# Patient Record
Sex: MALE | Race: ASIAN | NOT HISPANIC OR LATINO | URBAN - METROPOLITAN AREA
[De-identification: names, ages, dates, MRNs, and addresses within clinical notes are randomized per-mention and may not be internally consistent; named-entity substitution may affect disease eponyms.]

---

## 2023-08-28 ENCOUNTER — INPATIENT (INPATIENT)
Facility: HOSPITAL | Age: 83
LOS: 8 days | Discharge: NURSING, REHAB OR LONG-TERM CARE | DRG: 64 | End: 2023-09-06
Attending: PSYCHIATRY & NEUROLOGY | Admitting: PSYCHIATRY & NEUROLOGY
Payer: MEDICAID

## 2023-08-28 VITALS
SYSTOLIC BLOOD PRESSURE: 183 MMHG | HEART RATE: 75 BPM | TEMPERATURE: 98 F | RESPIRATION RATE: 20 BRPM | DIASTOLIC BLOOD PRESSURE: 97 MMHG | OXYGEN SATURATION: 94 %

## 2023-08-28 DIAGNOSIS — R42 DIZZINESS AND GIDDINESS: ICD-10-CM

## 2023-08-28 LAB
ALBUMIN SERPL ELPH-MCNC: 4.1 G/DL — SIGNIFICANT CHANGE UP (ref 3.3–5)
ALP SERPL-CCNC: 80 U/L — SIGNIFICANT CHANGE UP (ref 40–120)
ALT FLD-CCNC: 25 U/L — SIGNIFICANT CHANGE UP (ref 10–45)
ANION GAP SERPL CALC-SCNC: 15 MMOL/L — SIGNIFICANT CHANGE UP (ref 5–17)
APPEARANCE UR: CLEAR — SIGNIFICANT CHANGE UP
APTT BLD: 28.7 SEC — SIGNIFICANT CHANGE UP (ref 24.5–35.6)
AST SERPL-CCNC: 27 U/L — SIGNIFICANT CHANGE UP (ref 10–40)
BACTERIA # UR AUTO: NEGATIVE — SIGNIFICANT CHANGE UP
BASOPHILS # BLD AUTO: 0.02 K/UL — SIGNIFICANT CHANGE UP (ref 0–0.2)
BASOPHILS NFR BLD AUTO: 0.3 % — SIGNIFICANT CHANGE UP (ref 0–2)
BILIRUB SERPL-MCNC: 2.4 MG/DL — HIGH (ref 0.2–1.2)
BILIRUB UR-MCNC: NEGATIVE — SIGNIFICANT CHANGE UP
BUN SERPL-MCNC: 17 MG/DL — SIGNIFICANT CHANGE UP (ref 7–23)
CALCIUM SERPL-MCNC: 9.1 MG/DL — SIGNIFICANT CHANGE UP (ref 8.4–10.5)
CHLORIDE SERPL-SCNC: 104 MMOL/L — SIGNIFICANT CHANGE UP (ref 96–108)
CO2 SERPL-SCNC: 23 MMOL/L — SIGNIFICANT CHANGE UP (ref 22–31)
COLOR SPEC: YELLOW — SIGNIFICANT CHANGE UP
CREAT SERPL-MCNC: 0.76 MG/DL — SIGNIFICANT CHANGE UP (ref 0.5–1.3)
DIFF PNL FLD: NEGATIVE — SIGNIFICANT CHANGE UP
EGFR: 90 ML/MIN/1.73M2 — SIGNIFICANT CHANGE UP
EOSINOPHIL # BLD AUTO: 0.08 K/UL — SIGNIFICANT CHANGE UP (ref 0–0.5)
EOSINOPHIL NFR BLD AUTO: 1.2 % — SIGNIFICANT CHANGE UP (ref 0–6)
EPI CELLS # UR: 2 /HPF — SIGNIFICANT CHANGE UP
GLUCOSE SERPL-MCNC: 151 MG/DL — HIGH (ref 70–99)
GLUCOSE UR QL: ABNORMAL
HCT VFR BLD CALC: 42.9 % — SIGNIFICANT CHANGE UP (ref 39–50)
HGB BLD-MCNC: 14.5 G/DL — SIGNIFICANT CHANGE UP (ref 13–17)
HYALINE CASTS # UR AUTO: 2 /LPF — SIGNIFICANT CHANGE UP (ref 0–2)
IMM GRANULOCYTES NFR BLD AUTO: 0.5 % — SIGNIFICANT CHANGE UP (ref 0–0.9)
INR BLD: 1.03 RATIO — SIGNIFICANT CHANGE UP (ref 0.85–1.18)
KETONES UR-MCNC: NEGATIVE — SIGNIFICANT CHANGE UP
LEUKOCYTE ESTERASE UR-ACNC: NEGATIVE — SIGNIFICANT CHANGE UP
LYMPHOCYTES # BLD AUTO: 1.4 K/UL — SIGNIFICANT CHANGE UP (ref 1–3.3)
LYMPHOCYTES # BLD AUTO: 21.3 % — SIGNIFICANT CHANGE UP (ref 13–44)
MCHC RBC-ENTMCNC: 31.9 PG — SIGNIFICANT CHANGE UP (ref 27–34)
MCHC RBC-ENTMCNC: 33.8 GM/DL — SIGNIFICANT CHANGE UP (ref 32–36)
MCV RBC AUTO: 94.3 FL — SIGNIFICANT CHANGE UP (ref 80–100)
MONOCYTES # BLD AUTO: 0.77 K/UL — SIGNIFICANT CHANGE UP (ref 0–0.9)
MONOCYTES NFR BLD AUTO: 11.7 % — SIGNIFICANT CHANGE UP (ref 2–14)
NEUTROPHILS # BLD AUTO: 4.28 K/UL — SIGNIFICANT CHANGE UP (ref 1.8–7.4)
NEUTROPHILS NFR BLD AUTO: 65 % — SIGNIFICANT CHANGE UP (ref 43–77)
NITRITE UR-MCNC: NEGATIVE — SIGNIFICANT CHANGE UP
NRBC # BLD: 0 /100 WBCS — SIGNIFICANT CHANGE UP (ref 0–0)
PH UR: 7.5 — SIGNIFICANT CHANGE UP (ref 5–8)
PLATELET # BLD AUTO: 158 K/UL — SIGNIFICANT CHANGE UP (ref 150–400)
POTASSIUM SERPL-MCNC: 3.4 MMOL/L — LOW (ref 3.5–5.3)
POTASSIUM SERPL-SCNC: 3.4 MMOL/L — LOW (ref 3.5–5.3)
PROT SERPL-MCNC: 7 G/DL — SIGNIFICANT CHANGE UP (ref 6–8.3)
PROT UR-MCNC: ABNORMAL
PROTHROM AB SERPL-ACNC: 11.3 SEC — SIGNIFICANT CHANGE UP (ref 9.5–13)
RBC # BLD: 4.55 M/UL — SIGNIFICANT CHANGE UP (ref 4.2–5.8)
RBC # FLD: 12.9 % — SIGNIFICANT CHANGE UP (ref 10.3–14.5)
RBC CASTS # UR COMP ASSIST: 3 /HPF — SIGNIFICANT CHANGE UP (ref 0–4)
SODIUM SERPL-SCNC: 142 MMOL/L — SIGNIFICANT CHANGE UP (ref 135–145)
SP GR SPEC: 1.04 — HIGH (ref 1.01–1.02)
TROPONIN T, HIGH SENSITIVITY RESULT: 25 NG/L — SIGNIFICANT CHANGE UP (ref 0–51)
UROBILINOGEN FLD QL: ABNORMAL
WBC # BLD: 6.58 K/UL — SIGNIFICANT CHANGE UP (ref 3.8–10.5)
WBC # FLD AUTO: 6.58 K/UL — SIGNIFICANT CHANGE UP (ref 3.8–10.5)
WBC UR QL: 4 /HPF — SIGNIFICANT CHANGE UP (ref 0–5)

## 2023-08-28 PROCEDURE — 70547 MR ANGIOGRAPHY NECK W/O DYE: CPT | Mod: 26

## 2023-08-28 PROCEDURE — 99291 CRITICAL CARE FIRST HOUR: CPT

## 2023-08-28 PROCEDURE — 70498 CT ANGIOGRAPHY NECK: CPT | Mod: 26,MA

## 2023-08-28 PROCEDURE — 93306 TTE W/DOPPLER COMPLETE: CPT | Mod: 26

## 2023-08-28 PROCEDURE — 99285 EMERGENCY DEPT VISIT HI MDM: CPT

## 2023-08-28 PROCEDURE — 70450 CT HEAD/BRAIN W/O DYE: CPT | Mod: 26,59,MA

## 2023-08-28 PROCEDURE — 70496 CT ANGIOGRAPHY HEAD: CPT | Mod: 26,MA

## 2023-08-28 PROCEDURE — 70544 MR ANGIOGRAPHY HEAD W/O DYE: CPT | Mod: 26,59

## 2023-08-28 PROCEDURE — 0042T: CPT | Mod: MA

## 2023-08-28 PROCEDURE — 70551 MRI BRAIN STEM W/O DYE: CPT | Mod: 26

## 2023-08-28 RX ORDER — APIXABAN 2.5 MG/1
5 TABLET, FILM COATED ORAL EVERY 12 HOURS
Refills: 0 | Status: DISCONTINUED | OUTPATIENT
Start: 2023-08-28 | End: 2023-08-29

## 2023-08-28 RX ORDER — ATORVASTATIN CALCIUM 80 MG/1
80 TABLET, FILM COATED ORAL AT BEDTIME
Refills: 0 | Status: DISCONTINUED | OUTPATIENT
Start: 2023-08-28 | End: 2023-09-06

## 2023-08-28 RX ADMIN — ATORVASTATIN CALCIUM 80 MILLIGRAM(S): 80 TABLET, FILM COATED ORAL at 21:51

## 2023-08-28 RX ADMIN — APIXABAN 5 MILLIGRAM(S): 2.5 TABLET, FILM COATED ORAL at 17:16

## 2023-08-28 NOTE — CHART NOTE - NSCHARTNOTEFT_GEN_A_CORE
***Vascular Neurology Follow-up Chart Note***    Patient seen today with attending on rounds. Please also refer to attending addendum on initial H&P dated 8/28    Exam stable except: __      Impression: Acute onset of vertigo likely 2/2 cerebellar vs. brainstem dysfunction due to acute ischemic stroke. Mechanism of stroke potentially cardioembolic due to Afib.       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 This is difficult to age and could be acute or chronic. The more distal basilar artery and its branches are patent.   [] TTE                   [] A1C, LDL  [x] Atorvastatin 80  []  Physical therapy/OT/Speech Language    SBP goal: Permissive HTN up to 220/110 mmHg for first 24 hours after symptom onset, followed by gradual normotension  Other:    Case & Plan discussed with patient and family. All questions answered. Plan discussed with primary team       CORE MEASURES:         Admission NIHSS: 1 (dysarthria)     ICH score: N/A     Hunt and Rangel Score: N/A      Tenecteplase: [] YES [x] NO     Statin Therapy: [x] YES [] NO     Smoking [] YES [x] NO     Afib [x] YES [] NO     Stroke Education [x] YES [] NO    Dysphagia screen : [] Passed [] Failed- S&S pending [x] Pending  (Ensure medications are ordered via appropriate route)    DVT ppx: Therapeutic AC Eliquis 5 BID ***Vascular Neurology Follow-up Chart Note***    Patient seen today with attending on rounds. Please also refer to attending addendum on initial H&P dated 8/28    Exam stable      Impression: Acute onset of vertigo likely 2/2 cerebellar vs. brainstem dysfunction due to acute ischemic stroke. Mechanism of stroke potentially cardioembolic due to Afib.       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 This is difficult to age and could be acute or chronic. The more distal basilar artery and its branches are patent.   [] TTE                   [] A1C, LDL  [x] Atorvastatin 80  []  Physical therapy/OT/Speech Language    SBP goal: Permissive HTN up to 220/110 mmHg for first 24 hours after symptom onset, followed by gradual normotension  Other:    Case & Plan discussed with patient and family. All questions answered. Plan discussed with primary team       CORE MEASURES:         Admission NIHSS: 1 (dysarthria)     ICH score: N/A     Hunt and Rangel Score: N/A      Tenecteplase: [] YES [x] NO     Statin Therapy: [x] YES [] NO     Smoking [] YES [x] NO     Afib [x] YES [] NO     Stroke Education [x] YES [] NO    Dysphagia screen : [] Passed [] Failed- S&S pending [x] Pending  (Ensure medications are ordered via appropriate route)    DVT ppx: Therapeutic AC Eliquis 5 BID ***Vascular Neurology Follow-up Chart Note***    Patient seen today with attending on rounds. Please also refer to attending addendum on initial H&P dated 8/28    Exam stable      Impression: Acute onset of vertigo likely 2/2 cerebellar vs. brainstem dysfunction due to acute ischemic stroke. Mechanism of stroke potentially cardioembolic due to Afib.       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 This is difficult to age and could be acute or chronic. The more distal basilar artery and its branches are patent.   [] TTE                   [] A1C, LDL  [x] Atorvastatin 80  []  Physical therapy/OT/Speech Language    SBP goal: Permissive HTN up to 220/120 mmHg for first 24 hours after symptom onset, followed by gradual normotension  Other:    Case & Plan discussed with patient and family. All questions answered. Plan discussed with primary team       CORE MEASURES:         Admission NIHSS: 1 (dysarthria)     ICH score: N/A     Hunt and Rangel Score: N/A      Tenecteplase: [] YES [x] NO     Statin Therapy: [x] YES [] NO     Smoking [] YES [x] NO     Afib [x] YES [] NO     Stroke Education [x] YES [] NO    Dysphagia screen : [] Passed [] Failed- S&S pending [x] Pending  (Ensure medications are ordered via appropriate route)    DVT ppx: Therapeutic AC Eliquis 5 BID ***Vascular Neurology Follow-up Chart Note***    Patient seen today with attending on rounds. Please also refer to attending addendum on initial H&P dated 8/28    Exam stable      Impression: Acute onset of vertigo likely 2/2 cerebellar vs. brainstem dysfunction due to acute ischemic stroke. Mechanism of stroke potentially cardioembolic due to Afib.       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 This is difficult to age and could be acute or chronic. The more distal basilar artery and its branches are patent.   [] TTE                   [] A1C, LDL  [x] Atorvastatin 80  []  Physical therapy/OT/Speech Language    SBP goal: gradual normotension  Other:    Case & Plan discussed with patient and family. All questions answered. Plan discussed with primary team       CORE MEASURES:         Admission NIHSS: 1 (dysarthria)     ICH score: N/A     Hunt and Rangel Score: N/A      Tenecteplase: [] YES [x] NO     Statin Therapy: [x] YES [] NO     Smoking [] YES [x] NO     Afib [x] YES [] NO     Stroke Education [x] YES [] NO      Dysphagia screen : [] Passed [] Failed- S&S pending [x] Pending  (Ensure medications are ordered via appropriate route)    DVT ppx: Therapeutic AC Eliquis 5 BID

## 2023-08-28 NOTE — PHYSICAL THERAPY INITIAL EVALUATION ADULT - ADDITIONAL COMMENTS
as per pt, resides in a PH with daughter and her extend family, no stair to enter, stays on main floor, PTA, pt amb (I) with SC, distance limited to 450ft d/t weakness, (I) with ADLs.

## 2023-08-28 NOTE — ED PROVIDER NOTE - NS ED ROS FT
Constitutional:  (-) fever, (-) chills, (-) lethargy  Eyes:  (-) eye pain (-) visual changes  ENMT: (-) nasal discharge, (-) sore throat. (-) neck pain or stiffness  Cardiac: (-) chest pain (-) palpitations  Respiratory:  (-) cough (-) respiratory distress.   GI:  (-) nausea (-) vomiting (-) diarrhea (-) abdominal pain.  :  (-) dysuria (-) frequency (-) burning.  MS:  (-) back pain (-) joint pain.  Neuro: (+) dizziness (-) headache (-) numbness (-) tingling (-) focal weakness  Skin:  (-) rash  Except as documented in the HPI,  all other systems are negative

## 2023-08-28 NOTE — ED PROVIDER NOTE - ATTENDING CONTRIBUTION TO CARE
Real Paul DO: I have personally performed a face to face medical and diagnostic evaluation of the patient. I have discussed with and reviewed the Resident's and/or ACP's and/or Medical/PA/NP student's note and agree with the History, ROS, Physical Exam and MDM unless otherwise indicated. A brief summary of my personal evaluation and impression can be found below.     82-year-old male, history of CVA x2, baseline slurred speech, ?A-fib,, hypertension, not on AC, presenting with 1 day cute onset of headache, dizziness (spinning sensation) started at 12 AM.  Denies new changes in speech, weakness, numbness of extremities, chest pain, shortness of breath.  History obtained via Mandarin translation by granddaughter who is at bedside. Vital signs within normal limits.  Fingerstick was 135.      PE No acute distress. arousable, AAO x1, able to answer question appropriately w/ , slurred speech noted (baseline per granddaughter), no facial droop noted, cn III-xii intact otherwise, eomi, perrla, full extremity rom w/ 5/5 str, RESP: clear to ausculation b/l. No crackles or wheezing.  CARD: RRR, no murmurs.    New symptoms in the setting of prior strokes concerning for possible repeat stroke patient called as a code stroke, with residual symptoms unclear if at baseline we will continue with stroke imaging neurology is at bedside assessing patient, dispo pending lab results imaging results, metabolic infectious origin not yet ruled out although concern for stroke still present.  Patient will likely be admitted even if work-up is negative in the ED for MRI.

## 2023-08-28 NOTE — ED PROVIDER NOTE - CLINICAL SUMMARY MEDICAL DECISION MAKING FREE TEXT BOX
82-year-old male, history of CVA x2, baseline slurred speech, ?A-fib,, hypertension, not on AC, presenting with 1 day cute onset of headache, dizziness (spinning sensation) started at 12 AM.  Denies new changes in speech, weakness, numbness of extremities, chest pain, shortness of breath.  History obtained via Mandarin translation by granddaughter who is at bedside. Vital signs within normal limits.  Fingerstick was 135.  Physical exam finding patient is not in acute distress, arousable, AAO x3, able to answer question appropriately, slurred speech noted (baseline per granddaughter), no facial droop noted, normal cranial nerves exam otherwise, moving extremities spontaneously with normal strength (5 out of 5) in all 4 extremities.  Normal respiratory effort, clear lung exam bilaterally, abdomen soft, nontender, no leg swelling noted.Code stroke was activated.  Neurology at bedside.  We will follow-up on code stroke labs, imaging result. 82-year-old male, history of CVA x2, baseline slurred speech, ?A-fib,, hypertension, not on AC, presenting with 1 day cute onset of headache, dizziness (spinning sensation) started at 12 AM.  Denies new changes in speech, weakness, numbness of extremities, chest pain, shortness of breath.  History obtained via Mandarin translation by granddaughter who is at bedside. Vital signs within normal limits.  Fingerstick was 135.  Physical exam finding patient is not in acute distress, arousable, AAO x2, able to answer question appropriately, slurred speech noted (baseline per granddaughter), no facial droop noted, normal cranial nerves exam otherwise, moving extremities spontaneously with normal strength (5 out of 5) in all 4 extremities.  Normal respiratory effort, clear lung exam bilaterally, abdomen soft, nontender, no leg swelling noted.Code stroke was activated.  Neurology at bedside.  We will follow-up on code stroke labs, imaging result.

## 2023-08-28 NOTE — H&P ADULT - NSHPREVIEWOFSYSTEMS_GEN_ALL_CORE
CONSTITUTIONAL: No fever, chills, or malaise  HEAD: No HA or trauma  EYES: No visual changes, diplopia, or pain   NECK: No pain or stiffness  RESPIRATORY: No SOB, cough, wheezing  CARDIOVASCULAR: No chest pain or palpitations  GASTROINTESTINAL: No abdominal pain. No nausea, vomiting  MUSCULOSKELETAL: No muscle or joint pain; no swelling of stiffness

## 2023-08-28 NOTE — H&P ADULT - HISTORY OF PRESENT ILLNESS
82M R-handed PMHx Afib (not on AC), HTN, and strokes (2021, 2022; residual deficit of dysarthria) presenting with sudden onset "dizziness." Pt was lying down in his bed getting ready to sleep when he noticed sudden onset of "room-spinning sensation." No prior h/o similar symptoms. No fever, chills, HA, nausea or vomiting. No focal numbness or weakness at the time of symptom onset. Symptoms continued to persist therefore, visited ED. Code stroke activated upon arrival. LKN: 0000 8/28/23. NIHSS: 1 (dysarthria). mRS: 1.

## 2023-08-28 NOTE — ED ADULT NURSE NOTE - NSFALLRISKINTERV_ED_ALL_ED
Assistance OOB with selected safe patient handling equipment if applicable/Assistance with ambulation/Communicate fall risk and risk factors to all staff, patient, and family/Encourage patient to sit up slowly, dangle for a short time, stand at bedside before walking/Monitor gait and stability/Orthostatic vital signs/Provide patient with walking aids/Provide visual cue: yellow wristband, yellow gown, etc/Reinforce activity limits and safety measures with patient and family/Call bell, personal items and telephone in reach/Instruct patient to call for assistance before getting out of bed/chair/stretcher/Non-slip footwear applied when patient is off stretcher/[place] to call system/Physically safe environment - no spills, clutter or unnecessary equipment/Purposeful Proactive Rounding/Room/bathroom lighting operational, light cord in reach

## 2023-08-28 NOTE — SPEECH LANGUAGE PATHOLOGY EVALUATION - COMMENTS
+low vocal volume/intensity; overall low energy level DNT. Pt unable to attend to writing/reading tasks/pen/paper tasks 1. Pt will improve cognitive-linguistic skills for functional communication.  2. Pt will maximize receptive language skills  3. Pt will maximize expressive language skills Neurologic Exam:  Mental status - Eyes closed but opens to voice stimuli, oriented to person, place, and time. Speech fluent but mildly dysarthric. Repetition and naming intact. Follows simple commands.    8/28 CT Brain - IMPRESSION: Chronic left parietotemporal infarct. No intracranial hemorrhage.  LKN: 0000 8/28/23  NIHSS: 1 (dysarthria)   mRS: 1   Neuro IMPRESSION: Acute onset of vertigo likely 2/2 cerebellar vs. brainstem dysfunction due to acute ischemic stroke. Mechanism of stroke potentially cardioembolic due to Afib.    8/28 @ 0152 - failed dysphagia screen  Per discussion with RN, pt's family provided porridge this morning with no difficulty demonstrated    No h/o speech/swallow work-up at this location per EMR Expressive deficits marked by limited initiation of speech, limited utterance length, non-contingent responses to simple questions, with frequent replies of "I don't know". Per daughter, May, at bedside, pt's speech has appeared baseline since his CVA last year in [place]. She states that he fluctuates with "clear thoughts and confusion" at baseline. Pt does not utilize AAC or gestures as primary means of communication Pt presents with cognitive deficits in the areas of STM/LTM, general state of confusion and disorganization. Pt responds "I don't know" frequently. Ongoing assessment warranted Per , speech articulation deemed "fine" Receptive language deficits; impaired ability to follow 1-step directives or accurately answer simple yes/no questions. Suspect memory deficits negatively impact this area DNT. Pt unable to attend to reading tasks/pen/paper tasks

## 2023-08-28 NOTE — H&P ADULT - ATTENDING COMMENTS
I have reviewed the case in detail, and edited the assessment and plan per fellow note (see additional information section).  Briefly Mr. 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  We will continue anticoagulation with apixaban despite large infarct as benefit of anticoagulation may offset the risk of hemorrhage (atrial fibrillation and nonocclusive thrombus in the basilar artery).  I would like to reimage him with an MRA in 7 to 10 days to look for improvement of vascular thrombus.  PT OT PMR consultation  No objection to gradual normotension with blood pressure control.

## 2023-08-28 NOTE — SPEECH LANGUAGE PATHOLOGY EVALUATION - SLP GENERAL OBSERVATIONS
Pt encountered bedside, +2LO2NC; daughter, May, at bedside. Video language line  utilized in Mandarin, ID#[id number]. Per daughter, pt has residual deficits in speech/thinking after CVA in [place] 1 year prior. Pt inconsistently able to follow 1-step directives. Pt AA&Ox2 (person, place). Pt is able to make simple wants/needs c/o pain known.

## 2023-08-28 NOTE — SPEECH LANGUAGE PATHOLOGY EVALUATION - SLP DIAGNOSIS
Pt presents with cognitive deficits as receptive/expressive language deficits. Per discussion with daughter, May, deficits are residual from a CVA 1 year prior while in [place]. Pt is inconsistently able to follow 1-step commands; inconsistently answers simple yes/no questions. Expressively, simple utterances noted, i.e. "I don't know" or 1-2 word phrases such as "woman cleaning". Likely a combination of language deficits as well as memory deficits, as pt appears confused frequently and responds "I don't know" to simple questions. Per , speech articulation is deemed "fine", however, she states that his understanding of language appears impaired. Frequent non-contingent responses to simple questions.

## 2023-08-28 NOTE — ED PROVIDER NOTE - PHYSICAL EXAMINATION
Gen: Patient is well-appearing, NAD, AAOx2, able to answer questions appropriately   HEENT: NCAT, EOMI, PERRLA, normal conjunctiva, tongue midline, oral mucosa moist, slurred speech noted  Lung: CTAB, no respiratory distress, no wheezes/rhonchi/rales B/L, speaking in full sentences  CV: RRR, no murmurs, rubs or gallops, distal pulses 2+ b/l  Abd: soft, NT, ND, no guarding, no rigidity, no rebound tenderness  MSK: no visible deformities, ROM normal in UE/LE, strength 5/5 in all four extremities   Neuro: slurred speech noted, No focal sensory or motor deficits, normal CN exam   Skin: Warm, well perfused, no leg swelling  Psych: normal affect, calm

## 2023-08-28 NOTE — H&P ADULT - NSHPPHYSICALEXAM_GEN_ALL_CORE
Physical Examination:  General - non-toxic appearing male in acute distress  Cardiovascular - peripheral pulses palpable, no edema  Respiratory - breathing comfortably with no increased work of breathing    Neurologic Exam:  Mental status - Eyes closed but opens to voice stimuli, oriented to person, place, and time. Speech fluent but mildly dysarthric. Repetition and naming intact. Follows simple commands.    Cranial nerves - PERRL (3mm -> 2mm b/l), BTT intact b/l, EOMI, face sensation (V1-V3) intact b/l, facial strength intact without asymmetry b/l, hearing intact b/l, palate with symmetric elevation, tongue midline on protrusion with full lateral movement    Motor - Normal bulk and tone throughout. No pronator drift. Bilateral upper and lower extremities without drift.     Sensation - Light touch and pinprick intact throughout    DTR's -             Biceps      Triceps     Brachioradialis      Patellar    Ankle    Toes/plantar response  R             2+                2+                  2+                            2+            2+                 neutral  L              2+                2+                  2+                             2+           2+                 neutral    Coordination - unable to assess due to pt not wanting to open eyes to participate due to severe dizziness    Gait and station - unable to assess due to pt safety

## 2023-08-28 NOTE — STROKE CODE NOTE - NSRESATTESTFELSPV_ED_ALL_CORE_FT
Transitions of Care Status:  1.  Name of PCP: Dr. Jefferson  2.  PCP Contacted on Admission: [x ] Y    [ ] N    3.  PCP contacted at Discharge: [ ] Y    [ ] N    [ ] N/A  4.  Post-Discharge Appointment Date and Location:  5.  Summary of Handoff given to PCP: Dr. Raya

## 2023-08-28 NOTE — H&P ADULT - NSHPADDITIONALINFOADULT_GEN_ALL_CORE
Neurovascular Fellow Attestation    82M with right anterior cerebellar infarct. Intitally presented with vertigo. 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 NIH 1 (dysarthria), MRS 1 CTH with chronic left PT infarct. CTA with proximal basilar clot and distal reconstitution. MRI with right cerebellar infarct. There is also chronic left parietal and left cerebellar infarcts. Clinically patient is improving and given the history of atrial fibrillation without any anticoagulation and multi-territorial infarcts, patient’s stroke etiology is likely cardioembolic and was started on Eliquis 5mg BID.  Recommend to maintain SBP without sudden fluctuations and close monitoring given posterior circulation stroke.     Parker Burr  Neurovascular Fellow Neurovascular Fellow Attestation    82M with right anterior cerebellar infarct. Initially presented with vertigo. 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 NIH 1 (dysarthria), MRS 1 CTH with chronic left temporoparietal infarct. CTA with proximal basilar clot and distal reconstitution. MRI with right cerebellar infarct. There is also chronic left parietal and left cerebellar infarcts. Clinically patient is improving and given the history of atrial fibrillation without any anticoagulation and multi-territorial infarcts, patient’s stroke etiology is likely cardioembolic and was started on Eliquis 5mg BID.  Recommend to maintain SBP without sudden fluctuations and close monitoring given posterior circulation stroke.     Parker Burr  Neurovascular Fellow

## 2023-08-28 NOTE — ED ADULT NURSE NOTE - NSFALLASSESSNEED_ED_ALL_ED
General Discharge Instructions   PLEASE READ YOUR DISCHARGE INSTRUCTIONS ENTIRELY AS IT CONTAINS IMPORTANT INFORMATION.  If you were prescribed a narcotic or controlled medication, do not drive or operate heavy equipment or machinery while taking these medications.  If you were prescribed antibiotics, please take them to completion.  You must understand that you've received an Urgent Care treatment only and that you may be released before all your medical problems are known or treated. You, the patient, will arrange for follow up care as instructed.    OVER THE COUNTER RECOMMENDATIONS/SUGGESTIONS.    Make sure to stay well hydrated.    Use Nasal Saline to mechanically move any post nasal drip from your eustachian tube or from the back of your throat.    Use warm salt water gargles to ease your throat pain. Warm salt water gargles as needed for sore throat- 1/2 tsp salt to 1 cup warm water, gargle as desired.    Use an antihistamine such as Claritin, Zyrtec or Allegra to dry you out.    Use pseudoephedrine (behind the counter) to decongest. Pseudoephedrine 30 mg up to 240 mg /day. It can raise your blood pressure and give you palpitations.    Use mucinex (guaifenesin) to break up mucous up to 2400mg/day to loosen any mucous.    The mucinex DM pill has a cough suppressant that can be sedating. It can be used at night to stop the tickle at the back of your throat.    You can use Mucinex D (it has guaifenesin and a high dose of pseudoephedrine) in the mornings to help decongest.    Use Afrin in each nare for no longer than 3 days, as it is addictive. It can also dry out your mucous membranes and cause elevated blood pressure. This is especially useful if you are flying.    Use Flonase 1-2 sprays/nostril per day. It is a local acting steroid nasal spray, if you develop a bloody nose, stop using the medication immediately.    Sometimes Nyquil at night is beneficial to help you get some rest, however it is sedating and it  does have an antihistamine, and tylenol.    Honey is a natural cough suppressant that can be used.    Tylenol up to 4,000 mg a day is safe for short periods and can be used for body aches, pain, and fever. However in high doses and prolonged use it can cause liver irritation.    Ibuprofen is a non-steroidal anti-inflammatory that can be used for body aches, pain, and fever.However it can also cause stomach irritation if over used.     Follow up with your PCP or specialty clinic as instructed in the next 2-3 days if not improved or as needed. You can call [phone] to schedule an appointment with appropriate provider.      If you condition worsens, we recommend that you receive another evaluation at the emergency room immediately or contact your primary medical clinic's after hours call service to discuss your concerns.      Please return here or go to the Emergency Department for any concerns or worsening condition.   You can also call [phone] to schedule an appointment with the appropriate provider.    Please return here or go to the Emergency Department for any concerns or worsening of condition.    Thank you for choosing [facility]!    Our goal in the Urgent Care is to always provide outstanding medical care. You may receive a survey by mail or e-mail in the next week regarding your experience today. We would greatly appreciate you completing and returning the survey. Your feedback provides us with a way to recognize our staff who provide very good care, and it helps us learn how to improve when your experience was below our aspiration of excellence.      We appreciate you trusting us with your medical care. We hope you feel better soon. We will be happy to take care of you for all of your future medical needs.    Sincerely,    CADY Chase  Nausea & Vomiting  If your condition worsens or fails to improve we recommend that you receive another evaluation at the ER immediately or  contact your PCP to discuss your concerns or return here. You must understand that you've received an urgent care treatment only and that you may be released before all your medical problems are known or treated. You the patient will arrange for followup care as instructed.   Use prescribed anti-nausea medicine as needed and prescribed   Increase fluids and rest is important   Start off with liquid diet and progress as tolerated (see below)  Water and clear liquids are important so you do not get dehydrated. Drink a small amount at a time.  Do not force yourself to eat, especially if you have cramps, vomiting, or diarrhea. When you finally decide to start eating, do not eat large amounts at a time, even if you are hungry.  If you eat, avoid fatty, greasy, spicy, or fried foods.  Do not eat dairy products if you have diarrhea; they can make the diarrhea worse  Watch for any increase pain, fever, localized pain to right lower abdomen or continued vomiting or diarrhea.       yes

## 2023-08-28 NOTE — SWALLOW BEDSIDE ASSESSMENT ADULT - NS ASR SWALLOW FINDINGS DISCUS
GERALDO Burciaga; ALFREDO Looney; Pt; Pt's daughter May at bedside GERALDO Burciaga; ALFREDO Looney; Pt; Pt's daughter May at bedside/Nursing/Patient

## 2023-08-28 NOTE — H&P ADULT - NSHPLABSRESULTS_GEN_ALL_CORE
LABS:                        14.5   6.58  )-----------( 158      ( 28 Aug 2023 01:14 )             42.9     08-28    142  |  104  |  17  ----------------------------<  151<H>  3.4<L>   |  23  |  0.76    Ca    9.1      28 Aug 2023 01:14    TPro  7.0  /  Alb  4.1  /  TBili  2.4<H>  /  DBili  x   /  AST  27  /  ALT  25  /  AlkPhos  80  08-28    PT/INR - ( 28 Aug 2023 01:14 )   PT: 11.3 sec;   INR: 1.03 ratio         PTT - ( 28 Aug 2023 01:14 )  PTT:28.7 sec      Urinalysis Basic - ( 28 Aug 2023 01:14 )    Color: x / Appearance: x / SG: x / pH: x  Gluc: 151 mg/dL / Ketone: x  / Bili: x / Urobili: x   Blood: x / Protein: x / Nitrite: x   Leuk Esterase: x / RBC: x / WBC x   Sq Epi: x / Non Sq Epi: x / Bacteria: x      RADIOLOGY:  < from: CT Angio Brain Stroke Protocol  w/ IV Cont (08.28.23 @ 01:15) >    IMPRESSION:    CT PERFUSION BRAIN:  No evidence of acute territorial infarct or ischemia.    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   This is difficult to age and could be acute or chronic. The more distal   basilar artery and its branches are patent.    CTA NECK:  No flow-limiting stenosis, occlusion or dissection.    Findings were discussed by Dr. Vieira with Dr. Hutchins on 8/28/2023 1:20 AM   with read back confirmation.    < end of copied text >    < from: CT Brain Stroke Protocol (08.28.23 @ 01:05) >    IMPRESSION:  Chronic left parietotemporal infarct. No intracranial hemorrhage.    < end of copied text >

## 2023-08-28 NOTE — PHYSICAL THERAPY INITIAL EVALUATION ADULT - PERTINENT HX OF CURRENT PROBLEM, REHAB EVAL
82M R-handed PMHx Afib (not on AC), HTN, and strokes (2021, 2022; residual deficit of dysarthria) presenting with sudden onset "dizziness." No prior h/o similar symptoms. Vitals notable for systolic >180s. CBC and CMP WNL. CTH showing chronic left parietotemporal infarct but no acute findings. CTA neck w/o flow-limiting stenosis but CTA head demonstrating high-grade narrowing of proximal basilar artery with patency of distal basilar artery and its branches. Pt not a candidate for tenecteplase due to non-disabling symptoms and not a thrombectomy candidate due to no LVO seen on CTA H/N.

## 2023-08-28 NOTE — SWALLOW BEDSIDE ASSESSMENT ADULT - COMMENTS
Neurologic Exam:  Mental status - Eyes closed but opens to voice stimuli, oriented to person, place, and time. Speech fluent but mildly dysarthric. Repetition and naming intact. Follows simple commands.    8/28 CT Brain - IMPRESSION: Chronic left parietotemporal infarct. No intracranial hemorrhage.  LKN: 0000 8/28/23  NIHSS: 1 (dysarthria)   mRS: 1   Neuro IMPRESSION: Acute onset of vertigo likely 2/2 cerebellar vs. brainstem dysfunction due to acute ischemic stroke. Mechanism of stroke potentially cardioembolic due to Afib. Neurologic Exam:  Mental status - Eyes closed but opens to voice stimuli, oriented to person, place, and time. Speech fluent but mildly dysarthric. Repetition and naming intact. Follows simple commands.    8/28 CT Brain - IMPRESSION: Chronic left parietotemporal infarct. No intracranial hemorrhage.  LKN: 0000 8/28/23  NIHSS: 1 (dysarthria)   mRS: 1   Neuro IMPRESSION: Acute onset of vertigo likely 2/2 cerebellar vs. brainstem dysfunction due to acute ischemic stroke. Mechanism of stroke potentially cardioembolic due to Afib.    8/28 @ 0152 - failed dysphagia screen  Per discussion with RN, pt's family provided porridge this morning with no difficulty demonstrated    No h/o speech/swallow work-up at this location per EMR

## 2023-08-28 NOTE — ED ADULT NURSE NOTE - OBJECTIVE STATEMENT
You can access the FollowMyHealth Patient Portal offered by [facility] by registering at the following website: [email or web address]. By joining [facility]’s FollowMyHealth portal, you will also be able to view your health information using other applications (apps) compatible with our system.
81 y/o male BIBEMS c/o dizziness and HA. Code stroke called at 0040. As per EMS and family at bedside, patient began to feel dizziness associated with a sudden severe headache at 0000. PMHx CVA x2, last CVA 1 year ago (deficit of speech slurring at baseline), HTN, afib. Upon initial RN assessment patient a&ox1-2, states he is "in a car", MAEx4, breathing unlabored and spontaneous, abdomen soft and nontender, skin warm and dry. Sensory intact, no facial droop noted, strength 5/5, PERRLA. 20G LAC inserted by EMS with + blood return. Safety measures maintained and family at bedside.

## 2023-08-28 NOTE — H&P ADULT - ASSESSMENT
82M R-handed PMHx Afib (not on AC), HTN, and strokes (2021, 2022; residual deficit of dysarthria) presenting with sudden onset "dizziness." No prior h/o similar symptoms. Vitals notable for systolic >180s. CBC and CMP WNL. CTH showing chronic left parietotemporal infarct but no acute findings. CTA neck w/o flow-limiting stenosis but CTA head demonstrating high-grade narrowing of proximal basilar artery with patency of distal basilar artery and its branches. Pt not a candidate for tenecteplase due to non-disabling symptoms and not a thrombectomy candidate due to no LVO seen on CTA H/N.     LKN: 0000 8/28/23  NIHSS: 1 (dysarthria)   mRS: 1     IMPRESSION: Acute onset of vertigo likely 2/2 cerebellar vs. brainstem dysfunction due to acute ischemic stroke. Mechanism of stroke potentially cardioembolic due to Afib.     PLAN:  [] Start Eliquis 5mg BID  [] Start Atorvastatin 80mg QHS, (goal LDL<70)  [] DVT prophylaxis: Eliquis  [] MRI brain w/o contrast to look at the extent and distribution of the potential stroke   [] MRA H/N to look at the cerebral vasculature and better characterize basilar stenosis  [] TTE and telemetry to look for a cardiac source of embolism.  [] Check HbA1C and lipid panel  [] Telemonitoring; neurochecks and vital signs Q4H  [] Permissive HTN up to 220/120 mmHg for first 24 hours after the symptom onset followed by gradual normotension.   [] BGM goals 140-180  [] PT/OT evaluation  [] NPO until clears dysphagia screen, otherwise swallow evaluation  [] Stroke education provided      Case d/w stroke fellow.

## 2023-08-29 LAB
A1C WITH ESTIMATED AVERAGE GLUCOSE RESULT: 5.7 % — HIGH (ref 4–5.6)
ANION GAP SERPL CALC-SCNC: 11 MMOL/L — SIGNIFICANT CHANGE UP (ref 5–17)
ANION GAP SERPL CALC-SCNC: 13 MMOL/L — SIGNIFICANT CHANGE UP (ref 5–17)
APTT BLD: 34.4 SEC — SIGNIFICANT CHANGE UP (ref 24.5–35.6)
BUN SERPL-MCNC: 16 MG/DL — SIGNIFICANT CHANGE UP (ref 7–23)
BUN SERPL-MCNC: 21 MG/DL — SIGNIFICANT CHANGE UP (ref 7–23)
CALCIUM SERPL-MCNC: 8.8 MG/DL — SIGNIFICANT CHANGE UP (ref 8.4–10.5)
CALCIUM SERPL-MCNC: 9 MG/DL — SIGNIFICANT CHANGE UP (ref 8.4–10.5)
CHLORIDE SERPL-SCNC: 103 MMOL/L — SIGNIFICANT CHANGE UP (ref 96–108)
CHLORIDE SERPL-SCNC: 105 MMOL/L — SIGNIFICANT CHANGE UP (ref 96–108)
CO2 SERPL-SCNC: 24 MMOL/L — SIGNIFICANT CHANGE UP (ref 22–31)
CO2 SERPL-SCNC: 24 MMOL/L — SIGNIFICANT CHANGE UP (ref 22–31)
CREAT SERPL-MCNC: 1.07 MG/DL — SIGNIFICANT CHANGE UP (ref 0.5–1.3)
CREAT SERPL-MCNC: 1.1 MG/DL — SIGNIFICANT CHANGE UP (ref 0.5–1.3)
EGFR: 67 ML/MIN/1.73M2 — SIGNIFICANT CHANGE UP
EGFR: 70 ML/MIN/1.73M2 — SIGNIFICANT CHANGE UP
ESTIMATED AVERAGE GLUCOSE: 117 MG/DL — HIGH (ref 68–114)
GLUCOSE SERPL-MCNC: 122 MG/DL — HIGH (ref 70–99)
GLUCOSE SERPL-MCNC: 141 MG/DL — HIGH (ref 70–99)
HCT VFR BLD CALC: 43.5 % — SIGNIFICANT CHANGE UP (ref 39–50)
HCT VFR BLD CALC: 44.2 % — SIGNIFICANT CHANGE UP (ref 39–50)
HEPARINASE TEG R TIME: 8.2 MIN — SIGNIFICANT CHANGE UP (ref 4.3–8.3)
HGB BLD-MCNC: 14.6 G/DL — SIGNIFICANT CHANGE UP (ref 13–17)
HGB BLD-MCNC: 15 G/DL — SIGNIFICANT CHANGE UP (ref 13–17)
INR BLD: 1.37 RATIO — HIGH (ref 0.85–1.18)
MAGNESIUM SERPL-MCNC: 2 MG/DL — SIGNIFICANT CHANGE UP (ref 1.6–2.6)
MCHC RBC-ENTMCNC: 32.1 PG — SIGNIFICANT CHANGE UP (ref 27–34)
MCHC RBC-ENTMCNC: 32.5 PG — SIGNIFICANT CHANGE UP (ref 27–34)
MCHC RBC-ENTMCNC: 33.6 GM/DL — SIGNIFICANT CHANGE UP (ref 32–36)
MCHC RBC-ENTMCNC: 33.9 GM/DL — SIGNIFICANT CHANGE UP (ref 32–36)
MCV RBC AUTO: 95.6 FL — SIGNIFICANT CHANGE UP (ref 80–100)
MCV RBC AUTO: 95.9 FL — SIGNIFICANT CHANGE UP (ref 80–100)
NRBC # BLD: 0 /100 WBCS — SIGNIFICANT CHANGE UP (ref 0–0)
NRBC # BLD: 0 /100 WBCS — SIGNIFICANT CHANGE UP (ref 0–0)
PHOSPHATE SERPL-MCNC: 3.3 MG/DL — SIGNIFICANT CHANGE UP (ref 2.5–4.5)
PLATELET # BLD AUTO: 154 K/UL — SIGNIFICANT CHANGE UP (ref 150–400)
PLATELET # BLD AUTO: 164 K/UL — SIGNIFICANT CHANGE UP (ref 150–400)
PLATELET MAPPING ACTF MAX AMPLITUDE: 19.4 MM (ref 2–19)
PLATELET MAPPING ADP MAXIMUM AMPLITUDE: 58.7 MM — SIGNIFICANT CHANGE UP (ref 45–69)
PLATELET MAPPING ADP PERCENT INHIBITION: 15.3 % — SIGNIFICANT CHANGE UP (ref 0–17)
PLATELET MAPPING ARACHIDONIC ACID INHIBITION: 17.9 % (ref 0–11)
PLATELET MAPPING HKH MAXIMUM AMPLITUDE: 65.8 MM — SIGNIFICANT CHANGE UP (ref 53–68)
POTASSIUM SERPL-MCNC: 3.1 MMOL/L — LOW (ref 3.5–5.3)
POTASSIUM SERPL-MCNC: 3.5 MMOL/L — SIGNIFICANT CHANGE UP (ref 3.5–5.3)
POTASSIUM SERPL-SCNC: 3.1 MMOL/L — LOW (ref 3.5–5.3)
POTASSIUM SERPL-SCNC: 3.5 MMOL/L — SIGNIFICANT CHANGE UP (ref 3.5–5.3)
PROTHROM AB SERPL-ACNC: 14.9 SEC — HIGH (ref 9.5–13)
RAPIDTEG MAXIMUM AMPLITUDE: 66.6 MM — SIGNIFICANT CHANGE UP (ref 52–70)
RBC # BLD: 4.55 M/UL — SIGNIFICANT CHANGE UP (ref 4.2–5.8)
RBC # BLD: 4.61 M/UL — SIGNIFICANT CHANGE UP (ref 4.2–5.8)
RBC # FLD: 12.8 % — SIGNIFICANT CHANGE UP (ref 10.3–14.5)
RBC # FLD: 12.9 % — SIGNIFICANT CHANGE UP (ref 10.3–14.5)
SODIUM SERPL-SCNC: 140 MMOL/L — SIGNIFICANT CHANGE UP (ref 135–145)
SODIUM SERPL-SCNC: 140 MMOL/L — SIGNIFICANT CHANGE UP (ref 135–145)
TEG FUNCTIONAL FIBRINOGEN: 28.9 MM — SIGNIFICANT CHANGE UP (ref 15–32)
TEG MAXIMUM AMPLITUDE: 64.1 MM — SIGNIFICANT CHANGE UP (ref 52–69)
TEG REACTION TIME: 7.7 MIN — SIGNIFICANT CHANGE UP (ref 4.6–9.1)
WBC # BLD: 10.51 K/UL — HIGH (ref 3.8–10.5)
WBC # BLD: 13.12 K/UL — HIGH (ref 3.8–10.5)
WBC # FLD AUTO: 10.51 K/UL — HIGH (ref 3.8–10.5)
WBC # FLD AUTO: 13.12 K/UL — HIGH (ref 3.8–10.5)

## 2023-08-29 PROCEDURE — 70450 CT HEAD/BRAIN W/O DYE: CPT | Mod: 26

## 2023-08-29 PROCEDURE — 99233 SBSQ HOSP IP/OBS HIGH 50: CPT

## 2023-08-29 PROCEDURE — 99231 SBSQ HOSP IP/OBS SF/LOW 25: CPT

## 2023-08-29 PROCEDURE — 70450 CT HEAD/BRAIN W/O DYE: CPT | Mod: 26,77

## 2023-08-29 RX ORDER — ANDEXANET ALFA 200 MG/20ML
400 INJECTION, POWDER, LYOPHILIZED, FOR SOLUTION INTRAVENOUS ONCE
Refills: 0 | Status: DISCONTINUED | OUTPATIENT
Start: 2023-08-29 | End: 2023-08-29

## 2023-08-29 RX ORDER — ANDEXANET ALFA 200 MG/20ML
800 INJECTION, POWDER, LYOPHILIZED, FOR SOLUTION INTRAVENOUS ONCE
Refills: 0 | Status: DISCONTINUED | OUTPATIENT
Start: 2023-08-29 | End: 2023-08-29

## 2023-08-29 RX ORDER — ACETAMINOPHEN 500 MG
1000 TABLET ORAL ONCE
Refills: 0 | Status: DISCONTINUED | OUTPATIENT
Start: 2023-08-29 | End: 2023-08-30

## 2023-08-29 RX ORDER — POLYETHYLENE GLYCOL 3350 17 G/17G
17 POWDER, FOR SOLUTION ORAL DAILY
Refills: 0 | Status: DISCONTINUED | OUTPATIENT
Start: 2023-08-29 | End: 2023-08-31

## 2023-08-29 RX ORDER — ANDEXANET ALFA 200 MG/20ML
960 INJECTION, POWDER, LYOPHILIZED, FOR SOLUTION INTRAVENOUS ONCE
Refills: 0 | Status: DISCONTINUED | OUTPATIENT
Start: 2023-08-29 | End: 2023-08-29

## 2023-08-29 RX ORDER — ANDEXANET ALFA 200 MG/20ML
480 INJECTION, POWDER, LYOPHILIZED, FOR SOLUTION INTRAVENOUS ONCE
Refills: 0 | Status: DISCONTINUED | OUTPATIENT
Start: 2023-08-29 | End: 2023-08-29

## 2023-08-29 RX ORDER — SODIUM CHLORIDE 5 G/100ML
1000 INJECTION, SOLUTION INTRAVENOUS
Refills: 0 | Status: DISCONTINUED | OUTPATIENT
Start: 2023-08-29 | End: 2023-08-30

## 2023-08-29 RX ORDER — SENNA PLUS 8.6 MG/1
2 TABLET ORAL AT BEDTIME
Refills: 0 | Status: DISCONTINUED | OUTPATIENT
Start: 2023-08-29 | End: 2023-09-06

## 2023-08-29 RX ORDER — ACETAMINOPHEN 500 MG
650 TABLET ORAL EVERY 6 HOURS
Refills: 0 | Status: DISCONTINUED | OUTPATIENT
Start: 2023-08-29 | End: 2023-09-06

## 2023-08-29 RX ORDER — SODIUM CHLORIDE 5 G/100ML
1000 INJECTION, SOLUTION INTRAVENOUS
Refills: 0 | Status: DISCONTINUED | OUTPATIENT
Start: 2023-08-29 | End: 2023-08-29

## 2023-08-29 RX ORDER — ONDANSETRON 8 MG/1
4 TABLET, FILM COATED ORAL ONCE
Refills: 0 | Status: COMPLETED | OUTPATIENT
Start: 2023-08-29 | End: 2023-08-29

## 2023-08-29 RX ADMIN — APIXABAN 5 MILLIGRAM(S): 2.5 TABLET, FILM COATED ORAL at 05:53

## 2023-08-29 RX ADMIN — ONDANSETRON 4 MILLIGRAM(S): 8 TABLET, FILM COATED ORAL at 14:08

## 2023-08-29 RX ADMIN — SODIUM CHLORIDE 30 MILLILITER(S): 5 INJECTION, SOLUTION INTRAVENOUS at 19:47

## 2023-08-29 RX ADMIN — SODIUM CHLORIDE 30 MILLILITER(S): 5 INJECTION, SOLUTION INTRAVENOUS at 15:48

## 2023-08-29 RX ADMIN — Medication 650 MILLIGRAM(S): at 04:45

## 2023-08-29 RX ADMIN — Medication 650 MILLIGRAM(S): at 04:08

## 2023-08-29 RX ADMIN — ATORVASTATIN CALCIUM 80 MILLIGRAM(S): 80 TABLET, FILM COATED ORAL at 22:53

## 2023-08-29 RX ADMIN — SENNA PLUS 2 TABLET(S): 8.6 TABLET ORAL at 22:52

## 2023-08-29 NOTE — PROGRESS NOTE ADULT - ASSESSMENT
ASSESSMENT:     NEURO: Continue close monitoring for neurologic deterioration, permissive HTN, titrate statin to LDL goal less than 70, MRI Brain w/o, MRA Head w/o and Neck w/contrast. Physical therapy/OT/Speech eval/treatment.     ANTITHROMBOTIC THERAPY:     PULMONARY: CXR clear, protecting airway, saturating well     CARDIOVASCULAR: check TTE, cardiac monitoring                              SBP goal:     GASTROINTESTINAL:  dysphagia screen       Diet:     RENAL: BUN/Cr stable, good urine output      Na Goal: Greater than 135     Ying:    HEMATOLOGY: H/H stable, Platelets normal      DVT ppx: Heparin s.c [] LMWH []     ID: afebrile, no leukocytosis     OTHER:     DISPOSITION: Rehab or home depending on PT eval once stable and workup is complete      CORE MEASURES:        Admission NIHSS:      TPA: [] YES [] NO      LDL/HDL:     Depression Screen:      Statin Therapy:     Dysphagia Screen: [] PASS [] FAIL     Smoking [] YES [] NO      Afib [] YES [] NO     Stroke Education [] YES [] NO    Obtain screening lower extremity venous ultrasound in patients who meet 1 or more of the following criteria as patient is high risk for DVT/PE on admission:   [] History of DVT/PE  []Hypercoagulable states (Factor V Leiden, Cancer, OCP, etc. )  []Prolonged immobility (hemiplegia/hemiparesis/post operative or any other extended immobilization)  [] Transferred from outside facility (Rehab or Long term care)  [] Age </= to 50 82M R-handed PMHx Afib (not on AC), HTN, and strokes (2021, 2022; residual deficit of dysarthria) presenting with sudden onset "dizziness." No prior h/o similar symptoms. Vitals notable for systolic >180s. CBC and CMP WNL. CTH showing chronic left parietotemporal infarct but no acute findings. CTA neck w/o flow-limiting stenosis but CTA head demonstrating high-grade narrowing of proximal basilar artery with patency of distal basilar artery and its branches. Pt not a candidate for tenecteplase due to non-disabling symptoms and not a thrombectomy candidate due to no LVO seen on CTA H/N.     LKN: 0000 8/28/23  NIHSS: 1 (dysarthria)   mRS: 1     IMPRESSION: Acute onset of vertigo likely 2/2 cerebellar vs. brainstem dysfunction due to acute ischemic stroke. Mechanism of stroke potentially cardioembolic due to Afib.       NEURO: Continue close monitoring for neurologic deterioration, permissive HTN, titrate statin to LDL goal less than 70, MRI Brain w/o, MRA Head w/o and Neck w/contrast. Physical therapy/OT/Speech eval/treatment.     ANTITHROMBOTIC THERAPY:     PULMONARY: CXR clear, protecting airway, saturating well     CARDIOVASCULAR: check TTE, cardiac monitoring                              SBP goal:     GASTROINTESTINAL:  dysphagia screen       Diet:     RENAL: BUN/Cr stable, good urine output      Na Goal: Greater than 135     Ying:    HEMATOLOGY: H/H stable, Platelets normal      DVT ppx: Heparin s.c [] LMWH []     ID: afebrile, no leukocytosis     OTHER:     DISPOSITION: Rehab or home depending on PT eval once stable and workup is complete      CORE MEASURES:        Admission NIHSS:      TPA: [] YES [] NO      LDL/HDL:     Depression Screen:      Statin Therapy:     Dysphagia Screen: [] PASS [] FAIL     Smoking [] YES [] NO      Afib [] YES [] NO     Stroke Education [] YES [] NO    Obtain screening lower extremity venous ultrasound in patients who meet 1 or more of the following criteria as patient is high risk for DVT/PE on admission:   [] History of DVT/PE  []Hypercoagulable states (Factor V Leiden, Cancer, OCP, etc. )  []Prolonged immobility (hemiplegia/hemiparesis/post operative or any other extended immobilization)  [] Transferred from outside facility (Rehab or Long term care)  [] Age </= to 50 82M R-handed PMHx Afib (not on AC), HTN, and strokes (2021, 2022; residual deficit of dysarthria) presenting with sudden onset "dizziness." No prior h/o similar symptoms. Vitals notable for systolic >180s. CBC and CMP WNL. CTH showing chronic left parietotemporal infarct but no acute findings. CTA neck w/o flow-limiting stenosis but CTA head demonstrating high-grade narrowing of proximal basilar artery with patency of distal basilar artery and its branches. Pt not a candidate for tenecteplase due to non-disabling symptoms and not a thrombectomy candidate due to no LVO seen on CTA H/N.     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 Mechanism of stroke potentially cardioembolic due to Afib.     NEURO: Neurologically with decreased mental status today compared to yesterday. Neurosurgery consulted to due R cerebellar infarct with mass effect and hemorrhagic conversion, recommend holding Eliquis. NSCU consulted and accepted patient for closer monitoring. MRI Brain w/o, CTA Head/Neck as above. Physical therapy/OT/Speech eval/treatment.     ANTITHROMBOTIC THERAPY: Eliquis 5mg BID    PULMONARY: CXR clear, protecting airway, saturating well     CARDIOVASCULAR: TTE EF 55-60%, continue cardiac monitoring                              SBP goal: < 160    GASTROINTESTINAL:  dysphagia screen passed     Diet: Regular    RENAL: BUN/Cr stable, good urine output. Started 2% @ 30 for Na goal 145-150.      Na Goal: 145-150     Ying: No    HEMATOLOGY: H/H stable, Platelets 164     DVT ppx: Lovenox    ID: Afebrile, mild leukocytosis     OTHER: Plan discussed with patient and family at beside. Pending transfer to NSCU for closer monitoring.     DISPOSITION: Rehab once stable and workup is complete

## 2023-08-29 NOTE — PROGRESS NOTE ADULT - ASSESSMENT
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    NEURO  Neurochecks Q1  Hydrocephalus watch   2% saline   goal sodium 145-155  F/U repeat CTH   Activity: [] OOB as tolerated [x] Bedrest [] PT [] OT [] PMNR    PULM  RA  sat > 94%    CV  Irregularly irregular  Rate controlled  SBP goal 100-160    RENAL  Fluids: 2% at 50 ccs/hour  Monitor BMP Q 8    GI  Diet: NPO  GI prophylaxis [] not indicated [] PPI [] other:  Bowel regimen [x] colace [x] senna [] other:    ENDO:   Goal euglycemia (-180)    HEME/ONC:  VTE prophylaxis: [x] SCDs [] chemoprophylaxis [] high risk of DVT/PE on admission due to:    ID  Afebrile   WC: 10.51    SOCIAL/FAMILY:  [x] updated at bedside [] family meeting    CODE STATUS:  [x] Full Code [] DNR [] DNI [] Palliative/Comfort Care    DISPOSITION:  [x] ICU [] Stroke Unit [] Floor [] EMU [] RCU [] PCU    [] Patient is at high risk of neurologic deterioration/death due to: posterior fossa hemorrhage     Time seen:  Time spent: _60__ [] critical care minutes

## 2023-08-29 NOTE — PROGRESS NOTE ADULT - ASSESSMENT
A/P  82M with a PMHx of Afib (not on AC for 1 year ), HTN, and CVAs (2021, 2022; residual deficit of dysarthria) who was 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      Neuro:  Mechanism: likely cardioembolic  - neurochecks q1h  - SOC watch  - cytotoxic cerebral edema, HTS per below  - did not get reversed, hold AC/AP d/t HT  - Stroke consult  - Lipitor 80, titrate to LDL<70  - A1c, TSH, Lipid panel    Cards:  - -160  - TTE with bubble  - hold AC for AF d/t HT    Pulm:  - 2L NC  - Aspiration precautions  - ABG  - CXR for hypoxia     GI:   - Diet: NPO for tenuous airway  - bowel regimen    Renal:  - Fluids: HTS 3%@30  - strict IOs  - trend renal function  - [id number]  - Na goal 145-155    Endo:  - FS goal 120-180  - A1c    ID:  - Afebrile    Heme:  - monitor H/H  - teg    VTE:  - SCDs  - No chemoppx d/t ht  - Screening LED      Dispo: ICU

## 2023-08-29 NOTE — PROGRESS NOTE ADULT - SUBJECTIVE AND OBJECTIVE BOX
NSCU Progress Note    Assessment/Hospital Course:        24 Hour Events/Subjective:  - admitted, soc watch; on HTS       REVIEW OF SYSTEMS:  - negative except as above    VITALS:   -Reviewed      IMAGING/DATA:   - Reviewed          PHYSICAL EXAM:    General: cooperative  CVS: RRR  Pulm: CTAB  GI: Soft, NTND  Extremities: No LE Edema  Neuro:  aox0, arousable, does not fc, EOS, EOMI, pupils 3mm reaqctive, moves all extremeties spont

## 2023-08-29 NOTE — OCCUPATIONAL THERAPY INITIAL EVALUATION ADULT - PERTINENT HX OF CURRENT PROBLEM, REHAB EVAL
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 This is difficult to age and could be acute or chronic. The more distal basilar artery and its branches are patent.CTA NECK:No flow-limiting stenosis, occlusion or dissection.  MRI brain: Acute infarct in the right anterior cerebellum with associated internal hemorrhagic blood products.  No significant mass effect. There are chronic infarct in the left cerebellum, as well as a large chronic infarct of the left posterior MCA territory with old hemorrhagic blood products.MRA brain: No hemodynamically significant stenosisMRA neck: Markedly limited by significant motion. No gross evidence of occlusion. 82M R-handed PMHx Afib (not on AC), HTN, and strokes (2021, 2022; residual deficit of dysarthria) presenting with sudden onset "dizziness." Pt was lying down in his bed getting ready to sleep when he noticed sudden onset of "room-spinning sensation." No prior h/o similar symptoms. No fever, chills, HA, nausea or vomiting. No focal numbness or weakness at the time of symptom onset. Symptoms continued to persist therefore, visited ED. Code stroke activated upon arrival. LKN: 0000 8/28/23. NIHSS: 1 (dysarthria). mRS: 1.   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 This is difficult to age and could be acute or chronic. The more distal basilar artery and its branches are patent.CTA NECK:No flow-limiting stenosis, occlusion or dissection.  MRI brain: Acute infarct in the right anterior cerebellum with associated internal hemorrhagic blood products.  No significant mass effect. There are chronic infarct in the left cerebellum, as well as a large chronic infarct of the left posterior MCA territory with old hemorrhagic blood products.MRA brain: No hemodynamically significant stenosisMRA neck: Markedly limited by significant motion. No gross evidence of occlusion.

## 2023-08-29 NOTE — OCCUPATIONAL THERAPY INITIAL EVALUATION ADULT - LIVES WITH, PROFILE
Per family, Pt lives in a private house, 2 [address] with 1st floor setup + walk-in shower. Reports pt used a cane, but not consistently , and was IND with all ADLs./children/spouse

## 2023-08-29 NOTE — CONSULT NOTE ADULT - ASSESSMENT
82F h/o Afib on Eliquis (last this AM), HTN, CVA (2021,22 resid dysarth) p/f vertigo. NIHSS 1 on arrival. MRI yday w/ acute R ant cerebellar infarct + internal heme on SWI, chronic L cerebell + parieto-temporal infarct. CTH 8/29 w/ hemorrhagic conv R cerebellar stroke + mass effect on 4th vent compared to CTH yday. CTA sig for basilar clot. Coags 8/28 wnl before Eliquis. Na 140.  Exam: AOx2-3, no drift, dysarthria at baseline, BLAKE 5/5, SILT  - Will xfer NSCU for q1h checks under Dr. Bass ([facility] attending d/w Dr. Bass)  - Na goals 145-155  - Repeat CTH STAT  - Hold Eliquis  - Hold off on reversal for now given basilar artery clot  - Rpt coags + obtain TEG

## 2023-08-29 NOTE — PROGRESS NOTE ADULT - SUBJECTIVE AND OBJECTIVE BOX
THE PATIENT WAS SEEN AND EXAMINED BY ME WITH THE HOUSESTAFF AND STROKE TEAM DURING MORNING ROUNDS.   HPI:  82M R-handed PMHx Afib (not on AC), HTN, and strokes (2021, 2022; residual deficit of dysarthria) presenting with sudden onset "dizziness." Pt was lying down in his bed getting ready to sleep when he noticed sudden onset of "room-spinning sensation." No prior h/o similar symptoms. No fever, chills, HA, nausea or vomiting. No focal numbness or weakness at the time of symptom onset. Symptoms continued to persist therefore, visited ED. Code stroke activated upon arrival. LKN: 0000 8/28/23. NIHSS: 1 (dysarthria). mRS: 1.    (28 Aug 2023 01:44)      SUBJECTIVE: No events overnight.  No new neurologic complaints.      acetaminophen     Tablet .. 650 milliGRAM(s) Oral every 6 hours PRN  acetaminophen   IVPB .. 1000 milliGRAM(s) IV Intermittent once  apixaban 5 milliGRAM(s) Oral every 12 hours  atorvastatin 80 milliGRAM(s) Oral at bedtime      PHYSICAL EXAM:   Vital Signs Last 24 Hrs  T(C): 36.7 (29 Aug 2023 09:48), Max: 37.1 (28 Aug 2023 20:00)  T(F): 98 (29 Aug 2023 09:48), Max: 98.7 (28 Aug 2023 20:00)  HR: 57 (29 Aug 2023 13:27) (45 - 72)  BP: 157/69 (29 Aug 2023 13:27) (138/51 - 172/92)  BP(mean): 99 (29 Aug 2023 12:00) (75 - 115)  RR: 14 (29 Aug 2023 12:00) (14 - 31)  SpO2: 96% (29 Aug 2023 13:27) (91% - 97%)    Parameters below as of 29 Aug 2023 13:27  Patient On (Oxygen Delivery Method): nasal cannula  O2 Flow (L/min): 2      General: No acute distress  HEENT: EOM intact, visual fields full  Abdomen: Soft, nontender, nondistended   Extremities: No edema    NEUROLOGICAL EXAM:  Mental status: Awake, alert, oriented x3, no aphasia, no neglect, normal memory   Cranial Nerves: No facial asymmetry, no nystagmus, no dysarthria,  tongue midline  Motor exam: Normal tone, no drift, 5/5 RUE, 5/5 RLE, 5/5 LUE, 5/5 LLE, normal fine finger movements.  Sensation: Intact to light touch   Coordination/ Gait: No dysmetria, ELIE intact and symmetric bilaterally    LABS:                        14.6   10.51 )-----------( 164      ( 29 Aug 2023 06:22 )             43.5    08-29    140  |  103  |  16  ----------------------------<  141<H>  3.1<L>   |  24  |  1.10    Ca    8.8      29 Aug 2023 06:22    TPro  7.0  /  Alb  4.1  /  TBili  2.4<H>  /  DBili  x   /  AST  27  /  ALT  25  /  AlkPhos  80  08-28  PT/INR - ( 28 Aug 2023 01:14 )   PT: 11.3 sec;   INR: 1.03 ratio         PTT - ( 28 Aug 2023 01:14 )  PTT:28.7 sec      IMAGING: Reviewed by me.      THE PATIENT WAS SEEN AND EXAMINED BY ME WITH THE HOUSESTAFF AND STROKE TEAM DURING MORNING ROUNDS.     HPI:  82M R-handed PMHx Afib (not on AC), HTN, and strokes (2021, 2022; residual deficit of dysarthria) presenting with sudden onset "dizziness." Pt was lying down in his bed getting ready to sleep when he noticed sudden onset of "room-spinning sensation." No prior h/o similar symptoms. No fever, chills, HA, nausea or vomiting. No focal numbness or weakness at the time of symptom onset. Symptoms continued to persist therefore, visited ED. Code stroke activated upon arrival. LKN: 0000 8/28/23. NIHSS: 1 (dysarthria). mRS: 1.     SUBJECTIVE: No events overnight.  No new neurologic complaints.      acetaminophen     Tablet .. 650 milliGRAM(s) Oral every 6 hours PRN  acetaminophen   IVPB .. 1000 milliGRAM(s) IV Intermittent once  apixaban 5 milliGRAM(s) Oral every 12 hours  atorvastatin 80 milliGRAM(s) Oral at bedtime      PHYSICAL EXAM:   Vital Signs Last 24 Hrs  T(C): 36.7 (29 Aug 2023 09:48), Max: 37.1 (28 Aug 2023 20:00)  T(F): 98 (29 Aug 2023 09:48), Max: 98.7 (28 Aug 2023 20:00)  HR: 57 (29 Aug 2023 13:27) (45 - 72)  BP: 157/69 (29 Aug 2023 13:27) (138/51 - 172/92)  BP(mean): 99 (29 Aug 2023 12:00) (75 - 115)  RR: 14 (29 Aug 2023 12:00) (14 - 31)  SpO2: 96% (29 Aug 2023 13:27) (91% - 97%)    Parameters below as of 29 Aug 2023 13:27  Patient On (Oxygen Delivery Method): nasal cannula  O2 Flow (L/min): 2    General: No acute distress  HEENT: EOM intact, visual fields full  Abdomen: Soft, nontender, nondistended   Extremities: No edema    NEUROLOGICAL EXAM:  Mental status: Awake, alert, oriented x3, no aphasia, no neglect, normal memory   Cranial Nerves: No facial asymmetry, no nystagmus, no dysarthria,  tongue midline  Motor exam: Normal tone, no drift, 5/5 RUE, 5/5 RLE, 5/5 LUE, 5/5 LLE, normal fine finger movements.  Sensation: Intact to light touch   Coordination/ Gait: No dysmetria, ELIE intact and symmetric bilaterally    LABS:                        14.6   10.51 )-----------( 164      ( 29 Aug 2023 06:22 )             43.5    08-29    140  |  103  |  16  ----------------------------<  141<H>  3.1<L>   |  24  |  1.10    Ca    8.8      29 Aug 2023 06:22    TPro  7.0  /  Alb  4.1  /  TBili  2.4<H>  /  DBili  x   /  AST  27  /  ALT  25  /  AlkPhos  80  08-28  PT/INR - ( 28 Aug 2023 01:14 )   PT: 11.3 sec;   INR: 1.03 ratio         PTT - ( 28 Aug 2023 01:14 )  PTT:28.7 sec    IMAGING: Reviewed by me.     8/29/23: CT Head:   IMPRESSION:  Evolving acute right cerebellar infarct which is seen on the   patient's prior MR 8/28/2023 but not seen on initial CT. No associated   hemorrhage. Very subtle mass effect on the fourth ventricle. Chronic left   temporal infarct    8/28/23: CTA H/N   CT PERFUSION BRAIN:  No evidence of acute territorial infarct or ischemia.    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   This is difficult to age and could be acute or chronic. The more distal   basilar artery and its branches are patent.    CTA NECK:  No flow-limiting stenosis, occlusion or dissection.    8/28/23 CT Brain:   Chronic left parietotemporal infarct. No intracranial hemorrhage.    8/28/23: MRA H/N and MR Head:   MRI brain: Acute infarct in the right anterior cerebellum with associated   internal hemorrhagic blood products.  No significant mass effect. There   are chronic infarct in the left cerebellum, as well as a large chronic   infarct of the left posterior MCA territory with old hemorrhagic blood   products.    MRA brain: No hemodynamically significant stenosis    MRA neck: Markedly limited by significant motion. No gross evidence of   occlusion.    8/28/23 TTE:    1. Normal left ventricular cavity size. Left ventricular wall thickness is normal. Left ventricular systolic function is normal with an ejection fraction visually estimated at 55 to 60 %. There are no regional wall motion abnormalities seen.   2. Normal right ventricular cavity size, normal right ventricular wall thickness and normal right ventricular systolic function. The tricuspid annular plane systolic excursion (TAPSE) is 1.8 cm (normal >=1.7 cm).   3. The right atrium is mildly dilated in size.   4. No pericardial effusion seen.   5. No prior echocardiogram is available for comparison.   THE PATIENT WAS SEEN AND EXAMINED BY ME WITH THE HOUSESTAFF AND STROKE TEAM DURING MORNING ROUNDS.     HPI:  82M R-handed PMHx Afib (not on AC), HTN, and strokes (2021, 2022; residual deficit of dysarthria) presenting with sudden onset "dizziness." Pt was lying down in his bed getting ready to sleep when he noticed sudden onset of "room-spinning sensation." No prior h/o similar symptoms. No fever, chills, HA, nausea or vomiting. No focal numbness or weakness at the time of symptom onset. Symptoms continued to persist therefore, visited ED. Code stroke activated upon arrival. LKN: 0000 8/28/23. NIHSS: 1 (dysarthria). mRS: 1.     SUBJECTIVE: No events overnight.  No new neurologic complaints.      acetaminophen     Tablet .. 650 milliGRAM(s) Oral every 6 hours PRN  acetaminophen   IVPB .. 1000 milliGRAM(s) IV Intermittent once  apixaban 5 milliGRAM(s) Oral every 12 hours  atorvastatin 80 milliGRAM(s) Oral at bedtime    PHYSICAL EXAM:   Vital Signs Last 24 Hrs  T(C): 36.7 (29 Aug 2023 09:48), Max: 37.1 (28 Aug 2023 20:00)  T(F): 98 (29 Aug 2023 09:48), Max: 98.7 (28 Aug 2023 20:00)  HR: 57 (29 Aug 2023 13:27) (45 - 72)  BP: 157/69 (29 Aug 2023 13:27) (138/51 - 172/92)  BP(mean): 99 (29 Aug 2023 12:00) (75 - 115)  RR: 14 (29 Aug 2023 12:00) (14 - 31)  SpO2: 96% (29 Aug 2023 13:27) (91% - 97%)    Parameters below as of 29 Aug 2023 13:27  Patient On (Oxygen Delivery Method): nasal cannula  O2 Flow (L/min): 2    General: No acute distress  HEENT: EOM intact, visual fields full  Extremities: No edema    NEUROLOGICAL EXAM:  Mental status: Sleeping, arousable to voice. Oriented to self, hospital, month. No aphasia, no neglect.   Cranial Nerves: No facial asymmetry, no nystagmus, mild dysarthria  Motor exam: Normal tone, no drift, 5/5 RUE, 5/5 RLE, 5/5 LUE, 5/5 LLE  Sensation: Intact to light touch   Coordination/ Gait: Deferred    LABS:                        14.6   10.51 )-----------( 164      ( 29 Aug 2023 06:22 )             43.5    08-29    140  |  103  |  16  ----------------------------<  141<H>  3.1<L>   |  24  |  1.10    Ca    8.8      29 Aug 2023 06:22    TPro  7.0  /  Alb  4.1  /  TBili  2.4<H>  /  DBili  x   /  AST  27  /  ALT  25  /  AlkPhos  80  08-28  PT/INR - ( 28 Aug 2023 01:14 )   PT: 11.3 sec;   INR: 1.03 ratio         PTT - ( 28 Aug 2023 01:14 )  PTT:28.7 sec    IMAGING: Reviewed by me.     8/29/23: CT Head:   IMPRESSION:  Evolving acute right cerebellar infarct which is seen on the   patient's prior MR 8/28/2023 but not seen on initial CT. No associated   hemorrhage. Very subtle mass effect on the fourth ventricle. Chronic left   temporal infarct    8/28/23: CTA H/N   CT PERFUSION BRAIN:  No evidence of acute territorial infarct or ischemia.    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   This is difficult to age and could be acute or chronic. The more distal   basilar artery and its branches are patent.    CTA NECK:  No flow-limiting stenosis, occlusion or dissection.    8/28/23 CT Brain:   Chronic left parietotemporal infarct. No intracranial hemorrhage.    8/28/23: MRA H/N and MR Head:   MRI brain: Acute infarct in the right anterior cerebellum with associated   internal hemorrhagic blood products.  No significant mass effect. There   are chronic infarct in the left cerebellum, as well as a large chronic   infarct of the left posterior MCA territory with old hemorrhagic blood   products.    MRA brain: No hemodynamically significant stenosis    MRA neck: Markedly limited by significant motion. No gross evidence of   occlusion.    8/28/23 TTE:    1. Normal left ventricular cavity size. Left ventricular wall thickness is normal. Left ventricular systolic function is normal with an ejection fraction visually estimated at 55 to 60 %. There are no regional wall motion abnormalities seen.   2. Normal right ventricular cavity size, normal right ventricular wall thickness and normal right ventricular systolic function. The tricuspid annular plane systolic excursion (TAPSE) is 1.8 cm (normal >=1.7 cm).   3. The right atrium is mildly dilated in size.   4. No pericardial effusion seen.   5. No prior echocardiogram is available for comparison.

## 2023-08-29 NOTE — OCCUPATIONAL THERAPY INITIAL EVALUATION ADULT - STRENGTHENING, PT EVAL
GOAL: Pt will increase UE/LE strength 1 grade for increased safety and independence with ADL task in 4 weeks.

## 2023-08-29 NOTE — PROGRESS NOTE ADULT - SUBJECTIVE AND OBJECTIVE BOX
HOSPITAL COURSE: 82M with a PMHx of Afib (not on AC for 1 year ), HTN, and CVAs (2021, 2022; residual deficit of dysarthria) who presented with a chief complaint of dizziness. Patient during the admission stated that the room as spinning around him. During admission denied any CP/SOB/N/V.     As per admitting note: LKN: 0000 8/28/23. NIHSS: 1 (dysarthria). mRS: 1.     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  08/28: Acute infarct in the right anterior cerebellum with associated internal hemorrhagic blood products. CTH: Evolving acute right cerebellar infarct. Eliquis held.     Allergies    No Known Allergies    Intolerances    REVIEW OF SYSTEMS: [ ] Unable to Assess due to neurologic exam   [ ] All ROS addressed below are non-contributory, except:  Neuro: [ ] Headache [ ] Back pain [ ] Numbness [ ] Weakness [ ] Ataxia [x ] Dizziness [ ] Aphasia [ ] Dysarthria [ ] Visual disturbance  Resp: [ ] Shortness of breath/dyspnea, [ ] Orthopnea [ ] Cough  CV: [ ] Chest pain [ ] Palpitation [ ] Lightheadedness [ ] Syncope  Renal: [ ] Thirst [ ] Edema  GI: [ ] Nausea [ ] Emesis [ ] Abdominal pain [ ] Constipation [ ] Diarrhea  Hem: [ ] Hematemesis [ ] bright red blood per rectum  ID: [ ] Fever [ ] Chills [ ] Dysuria  ENT: [ ] Rhinorrhea      DEVICES:   [ ] Restraints [ ] ET tube [ ] central line [ ] arterial line [ ] pretty [ ] NGT/OGT [ ] EVD [ ] LD [ ] NIMESH/HMV [ ] Trach [ ] PEG [ ] Chest Tube     VITALS:   Vital Signs Last 24 Hrs  T(C): 36.7 (29 Aug 2023 09:48), Max: 37.1 (28 Aug 2023 20:00)  T(F): 98 (29 Aug 2023 09:48), Max: 98.7 (28 Aug 2023 20:00)  HR: 57 (29 Aug 2023 13:27) (51 - 72)  BP: 157/69 (29 Aug 2023 13:27) (148/71 - 172/92)  BP(mean): 99 (29 Aug 2023 12:00) (89 - 115)  RR: 14 (29 Aug 2023 12:00) (14 - 31)  SpO2: 96% (29 Aug 2023 13:27) (91% - 97%)    Parameters below as of 29 Aug 2023 13:27  Patient On (Oxygen Delivery Method): nasal cannula  O2 Flow (L/min): 2    CAPILLARY BLOOD GLUCOSE        I&O's Summary    28 Aug 2023 07:01  -  29 Aug 2023 07:00  --------------------------------------------------------  IN: 0 mL / OUT: 850 mL / NET: -850 mL        Respiratory:        LABS:                        14.6   10.51 )-----------( 164      ( 29 Aug 2023 06:22 )             43.5     08-29    140  |  103  |  16  ----------------------------<  141<H>  3.1<L>   |  24  |  1.10             MEDICATION LEVELS:     IVF FLUIDS/MEDICATIONS:   MEDICATIONS  (STANDING):  acetaminophen   IVPB .. 1000 milliGRAM(s) IV Intermittent once  atorvastatin 80 milliGRAM(s) Oral at bedtime  sodium chloride 2% . 1000 milliLiter(s) (30 mL/Hr) IV Continuous <Continuous>    MEDICATIONS  (PRN):  acetaminophen     Tablet .. 650 milliGRAM(s) Oral every 6 hours PRN Temp greater or equal to 38C (100.4F), Mild Pain (1 - 3), Moderate Pain (4 - 6), Severe Pain (7 - 10)        IMAGING:      EXAMINATION:  PHYSICAL EXAM:    Constitutional: No Acute Distress     Neurological: Arousable, PERRL, EOMI, No Gaze Preference, Face Symmetrical,   Motor exam:          Upper extremity                         Delt     Bicep     Tricep    HG                                                 R         5/5        5/5        5/5       5/5                                               L          5/5        5/5        5/5       5/5          Pulmonary: Clear to Auscultation, No rales, No rhonchi, No wheezes   Cardiovascular: S1, S2, Regular rate and rhythm   Gastrointestinal: Soft, Non-tender, Non-distended

## 2023-08-30 DIAGNOSIS — I48.91 UNSPECIFIED ATRIAL FIBRILLATION: ICD-10-CM

## 2023-08-30 DIAGNOSIS — I63.9 CEREBRAL INFARCTION, UNSPECIFIED: ICD-10-CM

## 2023-08-30 DIAGNOSIS — I10 ESSENTIAL (PRIMARY) HYPERTENSION: ICD-10-CM

## 2023-08-30 LAB
ANION GAP SERPL CALC-SCNC: 10 MMOL/L — SIGNIFICANT CHANGE UP (ref 5–17)
ANION GAP SERPL CALC-SCNC: 13 MMOL/L — SIGNIFICANT CHANGE UP (ref 5–17)
APTT BLD: 32.3 SEC — SIGNIFICANT CHANGE UP (ref 24.5–35.6)
BUN SERPL-MCNC: 22 MG/DL — SIGNIFICANT CHANGE UP (ref 7–23)
BUN SERPL-MCNC: 22 MG/DL — SIGNIFICANT CHANGE UP (ref 7–23)
CALCIUM SERPL-MCNC: 8.6 MG/DL — SIGNIFICANT CHANGE UP (ref 8.4–10.5)
CALCIUM SERPL-MCNC: 9 MG/DL — SIGNIFICANT CHANGE UP (ref 8.4–10.5)
CHLORIDE SERPL-SCNC: 105 MMOL/L — SIGNIFICANT CHANGE UP (ref 96–108)
CHLORIDE SERPL-SCNC: 105 MMOL/L — SIGNIFICANT CHANGE UP (ref 96–108)
CHOLEST SERPL-MCNC: 115 MG/DL — SIGNIFICANT CHANGE UP
CO2 SERPL-SCNC: 23 MMOL/L — SIGNIFICANT CHANGE UP (ref 22–31)
CO2 SERPL-SCNC: 25 MMOL/L — SIGNIFICANT CHANGE UP (ref 22–31)
CREAT SERPL-MCNC: 0.86 MG/DL — SIGNIFICANT CHANGE UP (ref 0.5–1.3)
CREAT SERPL-MCNC: 1 MG/DL — SIGNIFICANT CHANGE UP (ref 0.5–1.3)
EGFR: 76 ML/MIN/1.73M2 — SIGNIFICANT CHANGE UP
EGFR: 87 ML/MIN/1.73M2 — SIGNIFICANT CHANGE UP
GLUCOSE SERPL-MCNC: 116 MG/DL — HIGH (ref 70–99)
GLUCOSE SERPL-MCNC: 133 MG/DL — HIGH (ref 70–99)
HDLC SERPL-MCNC: 51 MG/DL — SIGNIFICANT CHANGE UP
INR BLD: 1.33 RATIO — HIGH (ref 0.85–1.18)
LIPID PNL WITH DIRECT LDL SERPL: 51 MG/DL — SIGNIFICANT CHANGE UP
MAGNESIUM SERPL-MCNC: 2 MG/DL — SIGNIFICANT CHANGE UP (ref 1.6–2.6)
MAGNESIUM SERPL-MCNC: 2 MG/DL — SIGNIFICANT CHANGE UP (ref 1.6–2.6)
NON HDL CHOLESTEROL: 64 MG/DL — SIGNIFICANT CHANGE UP
PHOSPHATE SERPL-MCNC: 2.6 MG/DL — SIGNIFICANT CHANGE UP (ref 2.5–4.5)
PHOSPHATE SERPL-MCNC: 2.9 MG/DL — SIGNIFICANT CHANGE UP (ref 2.5–4.5)
POTASSIUM SERPL-MCNC: 3.8 MMOL/L — SIGNIFICANT CHANGE UP (ref 3.5–5.3)
POTASSIUM SERPL-MCNC: 6 MMOL/L — HIGH (ref 3.5–5.3)
POTASSIUM SERPL-SCNC: 3.8 MMOL/L — SIGNIFICANT CHANGE UP (ref 3.5–5.3)
POTASSIUM SERPL-SCNC: 6 MMOL/L — HIGH (ref 3.5–5.3)
PROTHROM AB SERPL-ACNC: 14.5 SEC — HIGH (ref 9.5–13)
SODIUM SERPL-SCNC: 140 MMOL/L — SIGNIFICANT CHANGE UP (ref 135–145)
SODIUM SERPL-SCNC: 141 MMOL/L — SIGNIFICANT CHANGE UP (ref 135–145)
T4 FREE SERPL-MCNC: 1.4 NG/DL — SIGNIFICANT CHANGE UP (ref 0.9–1.8)
TRIGL SERPL-MCNC: 59 MG/DL — SIGNIFICANT CHANGE UP
TSH SERPL-MCNC: 0.47 UIU/ML — SIGNIFICANT CHANGE UP (ref 0.27–4.2)

## 2023-08-30 PROCEDURE — 70450 CT HEAD/BRAIN W/O DYE: CPT | Mod: 26

## 2023-08-30 PROCEDURE — 99291 CRITICAL CARE FIRST HOUR: CPT

## 2023-08-30 PROCEDURE — 93970 EXTREMITY STUDY: CPT | Mod: 26

## 2023-08-30 PROCEDURE — 93010 ELECTROCARDIOGRAM REPORT: CPT

## 2023-08-30 RX ORDER — ENOXAPARIN SODIUM 100 MG/ML
40 INJECTION SUBCUTANEOUS
Refills: 0 | Status: DISCONTINUED | OUTPATIENT
Start: 2023-08-30 | End: 2023-09-02

## 2023-08-30 RX ORDER — ASPIRIN/CALCIUM CARB/MAGNESIUM 324 MG
81 TABLET ORAL DAILY
Refills: 0 | Status: DISCONTINUED | OUTPATIENT
Start: 2023-08-30 | End: 2023-08-30

## 2023-08-30 RX ORDER — POTASSIUM CHLORIDE 20 MEQ
40 PACKET (EA) ORAL EVERY 4 HOURS
Refills: 0 | Status: COMPLETED | OUTPATIENT
Start: 2023-08-30 | End: 2023-08-30

## 2023-08-30 RX ORDER — SODIUM CHLORIDE 5 G/100ML
1000 INJECTION, SOLUTION INTRAVENOUS
Refills: 0 | Status: DISCONTINUED | OUTPATIENT
Start: 2023-08-30 | End: 2023-08-31

## 2023-08-30 RX ORDER — CHLORHEXIDINE GLUCONATE 213 G/1000ML
1 SOLUTION TOPICAL DAILY
Refills: 0 | Status: DISCONTINUED | OUTPATIENT
Start: 2023-08-30 | End: 2023-09-01

## 2023-08-30 RX ORDER — ASPIRIN/CALCIUM CARB/MAGNESIUM 324 MG
300 TABLET ORAL DAILY
Refills: 0 | Status: DISCONTINUED | OUTPATIENT
Start: 2023-08-30 | End: 2023-08-31

## 2023-08-30 RX ORDER — ASPIRIN/CALCIUM CARB/MAGNESIUM 324 MG
300 TABLET ORAL DAILY
Refills: 0 | Status: DISCONTINUED | OUTPATIENT
Start: 2023-08-30 | End: 2023-08-30

## 2023-08-30 RX ADMIN — CHLORHEXIDINE GLUCONATE 1 APPLICATION(S): 213 SOLUTION TOPICAL at 13:26

## 2023-08-30 RX ADMIN — SODIUM CHLORIDE 30 MILLILITER(S): 5 INJECTION, SOLUTION INTRAVENOUS at 01:00

## 2023-08-30 RX ADMIN — Medication 300 MILLIGRAM(S): at 18:15

## 2023-08-30 RX ADMIN — Medication 40 MILLIEQUIVALENT(S): at 02:36

## 2023-08-30 RX ADMIN — SODIUM CHLORIDE 50 MILLILITER(S): 5 INJECTION, SOLUTION INTRAVENOUS at 20:04

## 2023-08-30 RX ADMIN — SODIUM CHLORIDE 25 MILLILITER(S): 5 INJECTION, SOLUTION INTRAVENOUS at 20:03

## 2023-08-30 RX ADMIN — POLYETHYLENE GLYCOL 3350 17 GRAM(S): 17 POWDER, FOR SOLUTION ORAL at 13:26

## 2023-08-30 RX ADMIN — SODIUM CHLORIDE 50 MILLILITER(S): 5 INJECTION, SOLUTION INTRAVENOUS at 17:45

## 2023-08-30 RX ADMIN — Medication 40 MILLIEQUIVALENT(S): at 05:56

## 2023-08-30 RX ADMIN — ENOXAPARIN SODIUM 40 MILLIGRAM(S): 100 INJECTION SUBCUTANEOUS at 17:45

## 2023-08-30 NOTE — PROGRESS NOTE ADULT - ASSESSMENT
82F h/o Afib on Eliquis (last this AM), HTN, CVA (2021,22 resid dysarth) p/f vertigo. NIHSS 1 on arrival. MRI yday w/ acute R ant cerebellar infarct + internal heme on SWI, chronic L cerebell + parieto-temporal infarct. CTH 8/29 w/ hemorrhagic conv R cerebellar stroke + mass effect on 4th vent compared to CTH yday. CTA sig for basilar clot. Coags 8/28 wnl before Eliquis. Na 140.  Exam: AOx2-3, no drift, dysarthria at baseline, BLAKE 5/5, SILT      -CTH AM  -Na goals 145-155  - Hold Eliquis  - Hold off on reversal for now given basilar artery clot  -LED- p  -BMp q6hrs  -SOC crani watch

## 2023-08-30 NOTE — CONSULT NOTE ADULT - ASSESSMENT
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  08/28: Acute infarct in the right anterior cerebellum with associated internal hemorrhagic blood products. CTH: Evolving acute right cerebellar infarct. Eliquis held.   8/29 transferred to the NSCU for worsening neuro exam   Family at bedside who provided all information.   Does not follow with a cardiologist.

## 2023-08-30 NOTE — PROGRESS NOTE ADULT - SUBJECTIVE AND OBJECTIVE BOX
Patient seen and examined at bedside.    --Anticoagulation--    T(C): 36.9 (08-29-23 @ 19:00), Max: 36.9 (08-29-23 @ 19:00)  HR: 63 (08-29-23 @ 20:00) (53 - 72)  BP: 143/75 (08-29-23 @ 20:00) (143/75 - 172/92)  RR: 26 (08-29-23 @ 20:00) (14 - 31)  SpO2: 98% (08-29-23 @ 20:00) (91% - 98%)  Wt(kg): --    Exam: intact

## 2023-08-30 NOTE — CONSULT NOTE ADULT - PROBLEM SELECTOR RECOMMENDATION 9
recurrent   now with hemorraghic conversion   Orders per NSCU   Hydrocephalus watch   3% at 50 ml/hr, BMP q 6 hr, sodium goal 145-155  F/U repeat CTH

## 2023-08-30 NOTE — PROGRESS NOTE ADULT - ASSESSMENT
*INCOMPLETE*  2M R-handed PMHx Afib (not on AC), HTN, and strokes (2021, 2022; residual deficit of dysarthria) presenting with sudden onset "dizziness." No prior h/o similar symptoms. Vitals notable for systolic >180s. CBC and CMP WNL. CTH showing chronic left parietotemporal infarct but no acute findings. CTA neck w/o flow-limiting stenosis but CTA head demonstrating high-grade narrowing of proximal basilar artery with patency of distal basilar artery and its branches. Pt not a candidate for tenecteplase due to non-disabling symptoms and not a thrombectomy candidate due to no LVO seen on CTA H/N.     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 Mechanism of stroke potentially cardioembolic due to Afib.     NEURO: Neurologically with decreased mental status today compared to yesterday. Neurosurgery consulted to due R cerebellar infarct with mass effect and hemorrhagic conversion, recommend holding Eliquis. NSCU consulted and accepted patient for closer monitoring. MRI Brain w/o, CTA Head/Neck as above. Physical therapy/OT/Speech eval/treatment.     ANTITHROMBOTIC THERAPY: Eliquis 5mg BID    PULMONARY: CXR clear, protecting airway, saturating well     CARDIOVASCULAR: TTE EF 55-60%, continue cardiac monitoring                              SBP goal: < 160    GASTROINTESTINAL:  dysphagia screen passed     Diet: Regular    RENAL: BUN/Cr stable, good urine output. Started 2% @ 30 for Na goal 145-150.      Na Goal: 145-150     Ying: No    HEMATOLOGY: H/H stable, Platelets 164     DVT ppx: Lovenox    ID: Afebrile, mild leukocytosis     OTHER: Plan discussed with patient and family at beside. Pending transfer to NSCU for closer monitoring.     DISPOSITION: Rehab once stable and workup is complete *INCOMPLETE*  2M R-handed PMHx Afib (not on AC), HTN, and strokes (2021, 2022; residual deficit of dysarthria) presenting with sudden onset "dizziness." No prior h/o similar symptoms. Vitals notable for systolic >180s. CBC and CMP WNL. CTH showing chronic left parietotemporal infarct but no acute findings. CTA neck w/o flow-limiting stenosis but CTA head demonstrating high-grade narrowing of proximal basilar artery with patency of distal basilar artery and its branches. Pt not a candidate for tenecteplase due to non-disabling symptoms and not a thrombectomy candidate due to no LVO seen on CTA H/N. Course c/b hemorrhagic transformation of R cerebellar infarct with mass effect, after which pt transferred to NSCU for monitoring.    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 Mechanism of stroke potentially cardioembolic due to Afib.     NEURO: Yesterday pt's mental status had decreased, and given R cerebellar infarct with mass effect and hemorrhagic conversion, Eliquis held and pt transfered to NSCU for closer monitoring. Physical therapy/OT/Speech eval/treatment.     ANTITHROMBOTIC THERAPY: Eliquis 5 BID held. For now, recommend aspirin 81qd and Lovenox 40 qd (DVT ppx, not therapeutic level anticoagulation)    PULMONARY: CXR clear, protecting airway, saturating well     CARDIOVASCULAR: TTE EF 55-60%, continue cardiac monitoring                              SBP goal: < 160    GASTROINTESTINAL:  dysphagia screen passed     Diet: Regular    RENAL: BUN/Cr stable, good urine output. Started 2% @ 30 for Na goal 145-150.      Na Goal: 145-150     Ying: No    HEMATOLOGY: H/H stable, Platelets 154     DVT ppx: Lovenox    ID: Afebrile, mild leukocytosis     OTHER: Plan discussed with patient and family at beside.     DISPOSITION: Rehab once stable and workup is complete

## 2023-08-30 NOTE — PATIENT PROFILE ADULT - FUNCTIONAL ASSESSMENT - BASIC MOBILITY 1.
Verified Results  BUN/CREATININE 26Jun2018 01:45PM SVETLANA SNELL   [Jun 27, 2018 1:42PM SVETLANA SNELL]  fax to      Test Name Result Flag Reference   CREATININE 0.77 mg/dl  0.51-0.95   BUN/CREATININE RATIO 23  7-25   BUN 18 mg/dl  6-20   GFR EST. AMER >90     eGFR results = or >90 mL/min/1.73m2 = Normal kidney function.   GFR EST.NONAFRI AMER 84     eGFR 60 - 89 mL/min/1.73m2 = Mild decrease in kidney function.       
2 = A lot of assistance

## 2023-08-30 NOTE — PROGRESS NOTE ADULT - ASSESSMENT
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     NEURO  Neurochecks Q1  Hydrocephalus watch   3% at 50 ml/hr, BMP q 6 hr, sodium goal 145-155  F/U repeat CTH   Activity: [] OOB as tolerated [] Bedrest [x] PT [x] OT [] PMNR    PULM  RA  sat > 94%    CV  a fib   Rate controlled  SBP goal 100-160 mmhg     RENAL  Fluids: 3% at 50 ccs/hour  Monitor BMP Q 6    GI  Diet: NPO  GI prophylaxis [] not indicated [] PPI [] other:  Bowel regimen [x] colace [x] senna [] other:    ENDO:   Goal euglycemia (-180)    HEME/ONC:  VTE prophylaxis: [x] SCDs [] chemoprophylaxis [] high risk of DVT/PE on admission due to:    ID  Afebrile   WC: 10.51    SOCIAL/FAMILY:  [x] updated at bedside [] family meeting    CODE STATUS:  [x] Full Code [] DNR [] DNI [] Palliative/Comfort Care    DISPOSITION:  [x] ICU [] Stroke Unit [] Floor [] EMU [] RCU [] PCU    [] Patient is at high risk of neurologic deterioration/death due to: posterior fossa hemorrhage     Time seen:  Time spent: _30__ [] critical care minutes ASSESSMENT/PLAN: 82M with a PMHx of Afib (not on AC for 1 year ), HTN, and CVAs (2021, 2022; residual deficit of dysarthria) who was 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     NEURO  Neurochecks Q1  CTH in AM   Hydrocephalus watch   3% at 75 ml/hr, BMP q 6 hr, sodium goal 145-155  Started ASA as secondary stroke prevention  Pain control  Activity: [] OOB as tolerated [] Bedrest [x] PT [x] OT [] PMNR    PULM  4L NC  sat > 94%  CPAP tonight as per neurology trial    CV  a fib   Rate controlled  SBP goal 100-160 mmhg   TTE: 55-60  Cardio following     RENAL  Fluids: 3% at 75 ccs/hour  Monitor BMP Q 6  Monitor IOs    GI  Diet: NPO, failed dysphasia   GI prophylaxis [X] not indicated [] PPI [] other:  Bowel regimen [x] colace [x] senna [] other:    ENDO:   Goal euglycemia (-180)  AIC 5.5    HEME/ONC:  VTE prophylaxis: [x] SCDs [] chemoprophylaxis [] high risk of DVT/PE on admission due to:  SQL   LED:rashel     ID  Afebrile   WC: 10.51    SOCIAL/FAMILY:  [x] updated at bedside [] family meeting    CODE STATUS:  [x] Full Code [] DNR [] DNI [] Palliative/Comfort Care    DISPOSITION:  [x] ICU [] Stroke Unit [] Floor [] EMU [] RCU [] PCU    [X] Patient is at high risk of neurologic deterioration/death due to: posterior fossa hemorrhage     Time seen:  Time spent: _30__ [] critical care minutes

## 2023-08-30 NOTE — PROGRESS NOTE ADULT - SUBJECTIVE AND OBJECTIVE BOX
Neurology Progress Note    SUBJECTIVE/OBJECTIVE/INTERVAL EVENTS: Patient seen and examined at bedside w/ neuro attending .     MEDICATIONS  (STANDING):  atorvastatin 80 milliGRAM(s) Oral at bedtime  chlorhexidine 4% Liquid 1 Application(s) Topical daily  enoxaparin Injectable 40 milliGRAM(s) SubCutaneous <User Schedule>  polyethylene glycol 3350 17 Gram(s) Oral daily  senna 2 Tablet(s) Oral at bedtime  sodium chloride 3% + sodium acetate 50:50 1000 milliLiter(s) (50 mL/Hr) IV Continuous <Continuous>    MEDICATIONS  (PRN):  acetaminophen     Tablet .. 650 milliGRAM(s) Oral every 6 hours PRN Temp greater or equal to 38C (100.4F), Mild Pain (1 - 3), Moderate Pain (4 - 6), Severe Pain (7 - 10)      VITALS & EXAMINATION:  Vital Signs Last 24 Hrs  T(C): 36.6 (30 Aug 2023 11:00), Max: 37 (29 Aug 2023 23:00)  T(F): 97.9 (30 Aug 2023 11:00), Max: 98.6 (29 Aug 2023 23:00)  HR: 73 (30 Aug 2023 12:00) (57 - 81)  BP: 150/77 (30 Aug 2023 12:00) (122/70 - 160/81)  BP(mean): 97 (30 Aug 2023 12:00) (86 - 109)  RR: 17 (30 Aug 2023 12:00) (14 - 34)  SpO2: 98% (30 Aug 2023 12:00) (91% - 99%)    Parameters below as of 30 Aug 2023 07:00  Patient On (Oxygen Delivery Method): nasal cannula  O2 Flow (L/min): 4      General:  Constitutional: Male, appears stated age, nontoxic, not in distress,    Head: Normocephalic;   Eyes: clear sclera;   Extremities: No cyanosis;   Resp: breathing comfortably     Neurological (>12): INCOMPLETE  MS: Awake, alert.  Oriented person place situation. Follows commands. Attends to examiner  Language: Speech is hypophonic, clear, fluent, good repetition,  comprehension, registration of words.  CNs: PERRL (R 3mm, L 3mm). VFF. EOMI. No disconjugate gaze, skew. V1-3 intact LT, No facial asymmetry b/l. Hearing grossly normal b/l. Tongue midline.     Motor - Normal bulk and tone throughout. No pronator drift.    L/R (out of 5)       Deltoid  5/5    Biceps   5/5      Triceps  5/5         Wrist Extension 5/5   Wrist Flexion  5/5   Interossei 5/5     5/5   L/R (out of 5)       Hip Flexion  5/5    Hip Extension  5/5  Knee Extension  5/5  Dorsiflexion  5/5      Plantar Flexion 5/5     Sensation: Intact to LT b/l. Cortical: Extinction on DSS (neglect): none  Reflexes L/R:  Biceps(C5) 2/2  BR(C6) 2/2   Triceps(C7)  2/2 Patellar(L4)   2/2   Toes: mute  Coordination: No dysmetria to FTN b/l UE  Gait: Normal Romberg. No postural instability. Normal stance.    LABORATORY:  CBC                       15.0   13.12 )-----------( 154      ( 29 Aug 2023 22:54 )             44.2     Chem 08-30    141  |  105  |  22  ----------------------------<  116<H>  3.8   |  23  |  1.00    Ca    9.0      30 Aug 2023 06:04  Phos  2.9     08-30  Mg     2.0     08-30      LFTs   Coagulopathy PT/INR - ( 30 Aug 2023 06:04 )   PT: 14.5 sec;   INR: 1.33 ratio         PTT - ( 30 Aug 2023 06:04 )  PTT:32.3 sec  Lipid Panel 08-29 Chol 115 LDL -- HDL 51 Trig 59  A1c   Cardiac enzymes     U/A Urinalysis Basic - ( 30 Aug 2023 06:04 )    Color: x / Appearance: x / SG: x / pH: x  Gluc: 116 mg/dL / Ketone: x  / Bili: x / Urobili: x   Blood: x / Protein: x / Nitrite: x   Leuk Esterase: x / RBC: x / WBC x   Sq Epi: x / Non Sq Epi: x / Bacteria: x      CSF  Immunological  Other    STUDIES & IMAGING: (EEG, CT, MR, U/S, TTE/TIA): Neurology Progress Note    SUBJECTIVE/OBJECTIVE/INTERVAL EVENTS: Patient seen and examined at bedside w/ neuro attending .     MEDICATIONS  (STANDING):  atorvastatin 80 milliGRAM(s) Oral at bedtime  chlorhexidine 4% Liquid 1 Application(s) Topical daily  enoxaparin Injectable 40 milliGRAM(s) SubCutaneous <User Schedule>  polyethylene glycol 3350 17 Gram(s) Oral daily  senna 2 Tablet(s) Oral at bedtime  sodium chloride 3% + sodium acetate 50:50 1000 milliLiter(s) (50 mL/Hr) IV Continuous <Continuous>    MEDICATIONS  (PRN):  acetaminophen     Tablet .. 650 milliGRAM(s) Oral every 6 hours PRN Temp greater or equal to 38C (100.4F), Mild Pain (1 - 3), Moderate Pain (4 - 6), Severe Pain (7 - 10)      VITALS & EXAMINATION:  Vital Signs Last 24 Hrs  T(C): 36.6 (30 Aug 2023 11:00), Max: 37 (29 Aug 2023 23:00)  T(F): 97.9 (30 Aug 2023 11:00), Max: 98.6 (29 Aug 2023 23:00)  HR: 73 (30 Aug 2023 12:00) (57 - 81)  BP: 150/77 (30 Aug 2023 12:00) (122/70 - 160/81)  BP(mean): 97 (30 Aug 2023 12:00) (86 - 109)  RR: 17 (30 Aug 2023 12:00) (14 - 34)  SpO2: 98% (30 Aug 2023 12:00) (91% - 99%)    Parameters below as of 30 Aug 2023 07:00  Patient On (Oxygen Delivery Method): nasal cannula  O2 Flow (L/min): 4      General:  Constitutional: Male, appears stated age, nontoxic, not in distress,    Head: Normocephalic;   Eyes: clear sclera;   Extremities: No cyanosis;   Resp: breathing comfortably     NEUROLOGICAL EXAM:  Mental status: Sleeping, arousable to voice. Oriented to self, hospital,, had difficulty naming family members at bedside. No aphasia, no neglect.   Cranial Nerves: No facial asymmetry, no nystagmus, mild dysarthria  Motor exam: Normal tone, no drift, LE and UE str symmetric  Sensation: Intact to light touch   Coordination/ Gait: Deferred      LABORATORY:  CBC                       15.0   13.12 )-----------( 154      ( 29 Aug 2023 22:54 )             44.2     Chem 08-30    141  |  105  |  22  ----------------------------<  116<H>  3.8   |  23  |  1.00    Ca    9.0      30 Aug 2023 06:04  Phos  2.9     08-30  Mg     2.0     08-30      LFTs   Coagulopathy PT/INR - ( 30 Aug 2023 06:04 )   PT: 14.5 sec;   INR: 1.33 ratio         PTT - ( 30 Aug 2023 06:04 )  PTT:32.3 sec  Lipid Panel 08-29 Chol 115 LDL -- HDL 51 Trig 59  A1c   Cardiac enzymes     U/A Urinalysis Basic - ( 30 Aug 2023 06:04 )    Color: x / Appearance: x / SG: x / pH: x  Gluc: 116 mg/dL / Ketone: x  / Bili: x / Urobili: x   Blood: x / Protein: x / Nitrite: x   Leuk Esterase: x / RBC: x / WBC x   Sq Epi: x / Non Sq Epi: x / Bacteria: x      CSF  Immunological  Other    STUDIES & IMAGING: (EEG, CT, MR, U/S, TTE/TIA):

## 2023-08-30 NOTE — CONSULT NOTE ADULT - PROBLEM SELECTOR RECOMMENDATION 2
High CHADSVASC2 score   if able to make it out of current situation, will eventually plan for Watchmans device implant  Discussed with family at bedside   Currently rate controlled

## 2023-08-30 NOTE — PROGRESS NOTE ADULT - SUBJECTIVE AND OBJECTIVE BOX
HOSPITAL COURSE: 82M with a PMHx of Afib (not on AC for 1 year ), HTN, and CVAs (2021, 2022; residual deficit of dysarthria) who presented with a chief complaint of dizziness. Patient during the admission stated that the room as spinning around him. During admission denied any CP/SOB/N/V.     As per admitting note: LKN: 0000 8/28/23. NIHSS: 1 (dysarthria). mRS: 1.     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  08/28: Acute infarct in the right anterior cerebellum with associated internal hemorrhagic blood products. CTH: Evolving acute right cerebellar infarct. Eliquis held.     Allergies    No Known Allergies    Intolerances    REVIEW OF SYSTEMS: [ ] Unable to Assess due to neurologic exam   [ ] All ROS addressed below are non-contributory, except:  Neuro: [ ] Headache [ ] Back pain [ ] Numbness [ ] Weakness [ ] Ataxia [x ] Dizziness [ ] Aphasia [ ] Dysarthria [ ] Visual disturbance  Resp: [ ] Shortness of breath/dyspnea, [ ] Orthopnea [ ] Cough  CV: [ ] Chest pain [ ] Palpitation [ ] Lightheadedness [ ] Syncope  Renal: [ ] Thirst [ ] Edema  GI: [ ] Nausea [ ] Emesis [ ] Abdominal pain [ ] Constipation [ ] Diarrhea  Hem: [ ] Hematemesis [ ] bright red blood per rectum  ID: [ ] Fever [ ] Chills [ ] Dysuria  ENT: [ ] Rhinorrhea      DEVICES:   [ ] Restraints [ ] ET tube [ ] central line [ ] arterial line [ ] pretty [ ] NGT/OGT [ ] EVD [ ] LD [ ] NIMESH/HMV [ ] Trach [ ] PEG [ ] Chest Tube     ICU Vital Signs Last 24 Hrs  T(C): 37 (29 Aug 2023 23:00), Max: 37 (29 Aug 2023 23:00)  T(F): 98.6 (29 Aug 2023 23:00), Max: 98.6 (29 Aug 2023 23:00)  HR: 61 (30 Aug 2023 01:00) (53 - 76)  BP: 122/70 (30 Aug 2023 01:00) (122/70 - 171/65)  BP(mean): 86 (30 Aug 2023 01:00) (86 - 105)  ABP: --  ABP(mean): --  RR: 30 (30 Aug 2023 01:00) (14 - 34)  SpO2: 96% (30 Aug 2023 01:00) (91% - 99%)    O2 Parameters below as of 29 Aug 2023 19:00    O2 Flow (L/min): 2          Parameters below as of 29 Aug 2023 13:27  Patient On (Oxygen Delivery Method): nasal cannula  O2 Flow (L/min): 2    CAPILLARY BLOOD GLUCOSE        I&O's Summary    29 Aug 2023 07:01  -  30 Aug 2023 07:00  --------------------------------------------------------  IN: 240 mL / OUT: 580 mL / NET: -340 mL          Respiratory:        LABS:                                   15.0   13.12 )-----------( 154      ( 29 Aug 2023 22:54 )             44.2       08-30    141  |  105  |  22  ----------------------------<  116<H>  3.8   |  23  |  1.00    Ca    9.0      30 Aug 2023 06:04  Phos  2.9     08-30  Mg     2.0     08-30               MEDICATION LEVELS:     IVF FLUIDS/MEDICATIONS:   MEDICATIONS  (STANDING):  acetaminophen   IVPB .. 1000 milliGRAM(s) IV Intermittent once  atorvastatin 80 milliGRAM(s) Oral at bedtime  sodium chloride 2% . 1000 milliLiter(s) (30 mL/Hr) IV Continuous <Continuous>    MEDICATIONS  (PRN):  acetaminophen     Tablet .. 650 milliGRAM(s) Oral every 6 hours PRN Temp greater or equal to 38C (100.4F), Mild Pain (1 - 3), Moderate Pain (4 - 6), Severe Pain (7 - 10)        IMAGING:      EXAMINATION:  PHYSICAL EXAM:    Constitutional: No Acute Distress     Neurological: Arousable, PERRL, EOMI, No Gaze Preference, Face Symmetrical,   Motor exam:          Upper extremity                         Delt     Bicep     Tricep    HG                                                 R         5/5        5/5        5/5       5/5                                               L          5/5        5/5        5/5       5/5          Pulmonary: Clear to Auscultation, No rales, No rhonchi, No wheezes   Cardiovascular: S1, S2, Regular rate and rhythm   Gastrointestinal: Soft, Non-tender, Non-distended    HOSPITAL COURSE: 82M with a PMHx of Afib (not on AC for 1 year ), HTN, and CVAs (2021, 2022; residual deficit of dysarthria) who presented with a chief complaint of dizziness. Patient during the admission stated that the room as spinning around him. During admission denied any CP/SOB/N/V.     As per admitting note: LKN: 0000 8/28/23. NIHSS: 1 (dysarthria). mRS: 1.     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  08/28: Acute infarct in the right anterior cerebellum with associated internal hemorrhagic blood products. CTH: Evolving acute right cerebellar infarct. Eliquis held.   8/29 transferred to the NSCU for worsening neuro exam     Allergies    No Known Allergies    Intolerances    REVIEW OF SYSTEMS: [ ] Unable to Assess due to neurologic exam   [ ] All ROS addressed below are non-contributory, except:  Neuro: [ ] Headache [ ] Back pain [ ] Numbness [ ] Weakness [ ] Ataxia [x ] Dizziness [ ] Aphasia [ ] Dysarthria [ ] Visual disturbance  Resp: [ ] Shortness of breath/dyspnea, [ ] Orthopnea [ ] Cough  CV: [ ] Chest pain [ ] Palpitation [ ] Lightheadedness [ ] Syncope  Renal: [ ] Thirst [ ] Edema  GI: [ ] Nausea [ ] Emesis [ ] Abdominal pain [ ] Constipation [ ] Diarrhea  Hem: [ ] Hematemesis [ ] bright red blood per rectum  ID: [ ] Fever [ ] Chills [ ] Dysuria  ENT: [ ] Rhinorrhea      DEVICES:   [ ] Restraints [ ] ET tube [ ] central line [ ] arterial line [ ] pretty [ ] NGT/OGT [ ] EVD [ ] LD [ ] NIMESH/HMV [ ] Trach [ ] PEG [ ] Chest Tube     T(C): 36.6 (08-30-23 @ 07:00), Max: 37 (08-29-23 @ 23:00)  HR: 69 (08-30-23 @ 08:00) (57 - 81)  BP: 155/92 (08-30-23 @ 08:00) (122/70 - 171/65)  RR: 14 (08-30-23 @ 08:00) (14 - 34)  SpO2: 93% (08-30-23 @ 08:00) (91% - 99%)  08-29-23 @ 07:01  -  08-30-23 @ 07:00  --------------------------------------------------------  IN: 1110 mL / OUT: 580 mL / NET: 530 mL    acetaminophen     Tablet .. 650 milliGRAM(s) Oral every 6 hours PRN  atorvastatin 80 milliGRAM(s) Oral at bedtime  chlorhexidine 4% Liquid 1 Application(s) Topical daily  polyethylene glycol 3350 17 Gram(s) Oral daily  senna 2 Tablet(s) Oral at bedtime  sodium chloride 3% + sodium acetate 50:50 1000 milliLiter(s) IV Continuous <Continuous>          Respiratory:        LABS:                                   15.0   13.12 )-----------( 154      ( 29 Aug 2023 22:54 )             44.2       08-30    141  |  105  |  22  ----------------------------<  116<H>  3.8   |  23  |  1.00    Ca    9.0      30 Aug 2023 06:04  Phos  2.9     08-30  Mg     2.0     08-30              IMAGING:      EXAMINATION:  PHYSICAL EXAM:    Constitutional: No Acute Distress     Neurological: Arousable, PERRL, EOMI, No Gaze Preference, Face Symmetrical,   Motor exam:          Upper extremity                         Delt     Bicep     Tricep    HG                                                 R         5/5        5/5        5/5       5/5                                               L          5/5        5/5        5/5       5/5          Pulmonary: Clear to Auscultation, No rales, No rhonchi, No wheezes   Cardiovascular: S1, S2, Regular rate and rhythm   Gastrointestinal: Soft, Non-tender, Non-distended         LABS:  Na: 141 (08-30 @ 06:04), 140 (08-29 @ 22:54), 140 (08-29 @ 06:22), 142 (08-28 @ 01:14)  K: 3.8 (08-30 @ 06:04), 3.5 (08-29 @ 22:54), 3.1 (08-29 @ 06:22), 3.4 (08-28 @ 01:14)  Cl: 105 (08-30 @ 06:04), 105 (08-29 @ 22:54), 103 (08-29 @ 06:22), 104 (08-28 @ 01:14)  CO2: 23 (08-30 @ 06:04), 24 (08-29 @ 22:54), 24 (08-29 @ 06:22), 23 (08-28 @ 01:14)  BUN: 22 (08-30 @ 06:04), 21 (08-29 @ 22:54), 16 (08-29 @ 06:22), 17 (08-28 @ 01:14)  Cr: 1.00 (08-30 @ 06:04), 1.07 (08-29 @ 22:54), 1.10 (08-29 @ 06:22), 0.76 (08-28 @ 01:14)  Glu: 116(08-30 @ 06:04), 122(08-29 @ 22:54), 141(08-29 @ 06:22), 151(08-28 @ 01:14)    Hgb: 15.0 (08-29 @ 22:54), 14.6 (08-29 @ 06:22), 14.5 (08-28 @ 01:14)  Hct: 44.2 (08-29 @ 22:54), 43.5 (08-29 @ 06:22), 42.9 (08-28 @ 01:14)  WBC: 13.12 (08-29 @ 22:54), 10.51 (08-29 @ 06:22), 6.58 (08-28 @ 01:14)  Plt: 154 (08-29 @ 22:54), 164 (08-29 @ 06:22), 158 (08-28 @ 01:14)    INR: 1.33 08-30-23 @ 06:04, 1.37 08-29-23 @ 17:21, 1.03 08-28-23 @ 01:14  PTT: 32.3 08-30-23 @ 06:04, 34.4 08-29-23 @ 17:21, 28.7 08-28-23 @ 01:14

## 2023-08-30 NOTE — PROGRESS NOTE ADULT - SUBJECTIVE AND OBJECTIVE BOX
HOSPITAL COURSE: 82M with a PMHx of Afib (not on AC for 1 year ), HTN, and CVAs (2021, 2022; residual deficit of dysarthria) who presented with a chief complaint of dizziness. Patient during the admission stated that the room as spinning around him. During admission denied any CP/SOB/N/V.     As per admitting note: LKN: 0000 8/28/23. NIHSS: 1 (dysarthria). mRS: 1.     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  08/28: Acute infarct in the right anterior cerebellum with associated internal hemorrhagic blood products. CTH: Evolving acute right cerebellar infarct. Eliquis held.   8/29 transferred to the NSCU for worsening neuro exam       Admission Scores  GCS:   HH:   MF:   NIHSS:   RASS:    CAM-ICU:   ICH:    24 hour Events:       Allergies    No Known Allergies    Intolerances        REVIEW OF SYSTEMS: [ ] Unable to Assess due to neurologic exam   [ ] All ROS addressed below are non-contributory, except:  Neuro: [ ] Headache [ ] Back pain [ ] Numbness [ ] Weakness [ ] Ataxia [ ] Dizziness [ ] Aphasia [ ] Dysarthria [ ] Visual disturbance  Resp: [ ] Shortness of breath/dyspnea, [ ] Orthopnea [ ] Cough  CV: [ ] Chest pain [ ] Palpitation [ ] Lightheadedness [ ] Syncope  Renal: [ ] Thirst [ ] Edema  GI: [ ] Nausea [ ] Emesis [ ] Abdominal pain [ ] Constipation [ ] Diarrhea  Hem: [ ] Hematemesis [ ] bright red blood per rectum  ID: [ ] Fever [ ] Chills [ ] Dysuria  ENT: [ ] Rhinorrhea      DEVICES:   [ ] Restraints [ ] ET tube [ ] central line [ ] arterial line [ ] pretty [ ] NGT/OGT [ ] EVD [ ] LD [ ] NIMESH/HMV [ ] Trach [ ] PEG [ ] Chest Tube     VITALS:   Vital Signs Last 24 Hrs  T(C): 36.8 (30 Aug 2023 19:00), Max: 37 (29 Aug 2023 23:00)  T(F): 98.2 (30 Aug 2023 19:00), Max: 98.6 (29 Aug 2023 23:00)  HR: 73 (30 Aug 2023 20:00) (61 - 81)  BP: 160/83 (30 Aug 2023 20:00) (122/70 - 160/83)  BP(mean): 105 (30 Aug 2023 20:00) (86 - 109)  RR: 23 (30 Aug 2023 20:00) (14 - 34)  SpO2: 97% (30 Aug 2023 20:00) (90% - 99%)    Parameters below as of 30 Aug 2023 19:00  Patient On (Oxygen Delivery Method): nasal cannula  O2 Flow (L/min): 4    CAPILLARY BLOOD GLUCOSE        I&O's Summary    29 Aug 2023 07:01  -  30 Aug 2023 07:00  --------------------------------------------------------  IN: 1110 mL / OUT: 580 mL / NET: 530 mL    30 Aug 2023 07:01  -  30 Aug 2023 20:44  --------------------------------------------------------  IN: 750 mL / OUT: 50 mL / NET: 700 mL        Respiratory:        LABS:                        15.0   13.12 )-----------( 154      ( 29 Aug 2023 22:54 )             44.2     08-30    140  |  105  |  22  ----------------------------<  133<H>  6.0<H>   |  25  |  0.86             MEDICATION LEVELS:     IVF FLUIDS/MEDICATIONS:   MEDICATIONS  (STANDING):  aspirin Suppository 300 milliGRAM(s) Rectal daily  atorvastatin 80 milliGRAM(s) Oral at bedtime  chlorhexidine 4% Liquid 1 Application(s) Topical daily  enoxaparin Injectable 40 milliGRAM(s) SubCutaneous <User Schedule>  polyethylene glycol 3350 17 Gram(s) Oral daily  senna 2 Tablet(s) Oral at bedtime  sodium chloride 3% + sodium acetate 50:50 1000 milliLiter(s) (50 mL/Hr) IV Continuous <Continuous>  sodium chloride 3% + sodium acetate 50:50 1000 milliLiter(s) (25 mL/Hr) IV Continuous <Continuous>    MEDICATIONS  (PRN):  acetaminophen     Tablet .. 650 milliGRAM(s) Oral every 6 hours PRN Temp greater or equal to 38C (100.4F), Mild Pain (1 - 3), Moderate Pain (4 - 6), Severe Pain (7 - 10)        IMAGING:      EXAMINATION:  PHYSICAL EXAM:    Constitutional: No Acute Distress     Neurological: Awake, alert oriented to person, place and time, Following Commands, PERRL, EOMI, No Gaze Preference, Face Symmetrical, Speech Fluent, No dysmetria, No ataxia, No nystagmus     Motor exam:          Upper extremity                         Delt     Bicep     Tricep    HG                                                 R         5/5        5/5        5/5       5/5                                               L          5/5        5/5        5/5       5/5          Lower extremity                        HF         KF        KE       DF         PF                                                  R        5/5        5/5        5/5       5/5         5/5                                               L         5/5        5/5       5/5       5/5          5/5                                                 Sensation: [ ] intact to light touch  [ ] decreased:     Reflexes: Deep Tendon Reflexes Intact     Pulmonary: Clear to Auscultation, No rales, No rhonchi, No wheezes     Cardiovascular: S1, S2, Regular rate and rhythm     Gastrointestinal: Soft, Non-tender, Non-distended     Extremities: No calf tenderness     Incision:    HOSPITAL COURSE: 82M with a PMHx of Afib (not on AC for 1 year ), HTN, and CVAs (2021, 2022; residual deficit of dysarthria) who presented with a chief complaint of dizziness. Patient during the admission stated that the room as spinning around him. During admission denied any CP/SOB/N/V.     As per admitting note: LKN: 0000 8/28/23. NIHSS: 1 (dysarthria). mRS: 1.     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  08/28: Acute infarct in the right anterior cerebellum with associated internal hemorrhagic blood products. CTH: Evolving acute right cerebellar infarct. Eliquis held.   8/29 transferred to the NSCU for worsening neuro exam       Admission Scores  GCS:   HH:   MF:   NIHSS:1   RASS:    CAM-ICU:   ICH:    24 hour Events:       Allergies    No Known Allergies    Intolerances        REVIEW OF SYSTEMS: [ ] Unable to Assess due to neurologic exam   [X] All ROS addressed below are non-contributory, except:  Neuro: [ ] Headache [ ] Back pain [ ] Numbness [ ] Weakness [ ] Ataxia [ ] Dizziness [ ] Aphasia [ ] Dysarthria [ ] Visual disturbance  Resp: [ ] Shortness of breath/dyspnea, [ ] Orthopnea [ ] Cough  CV: [ ] Chest pain [ ] Palpitation [ ] Lightheadedness [ ] Syncope  Renal: [ ] Thirst [ ] Edema  GI: [ ] Nausea [ ] Emesis [ ] Abdominal pain [ ] Constipation [ ] Diarrhea  Hem: [ ] Hematemesis [ ] bright red blood per rectum  ID: [ ] Fever [ ] Chills [ ] Dysuria  ENT: [ ] Rhinorrhea      DEVICES:   [ ] Restraints [ ] ET tube [ ] central line [ ] arterial line [ ] pretty [ ] NGT/OGT [ ] EVD [ ] LD [ ] NIMESH/HMV [ ] Trach [ ] PEG [ ] Chest Tube     VITALS:   Vital Signs Last 24 Hrs  T(C): 36.8 (30 Aug 2023 19:00), Max: 37 (29 Aug 2023 23:00)  T(F): 98.2 (30 Aug 2023 19:00), Max: 98.6 (29 Aug 2023 23:00)  HR: 73 (30 Aug 2023 20:00) (61 - 81)  BP: 160/83 (30 Aug 2023 20:00) (122/70 - 160/83)  BP(mean): 105 (30 Aug 2023 20:00) (86 - 109)  RR: 23 (30 Aug 2023 20:00) (14 - 34)  SpO2: 97% (30 Aug 2023 20:00) (90% - 99%)    Parameters below as of 30 Aug 2023 19:00  Patient On (Oxygen Delivery Method): nasal cannula  O2 Flow (L/min): 4    CAPILLARY BLOOD GLUCOSE        I&O's Summary    29 Aug 2023 07:01  -  30 Aug 2023 07:00  --------------------------------------------------------  IN: 1110 mL / OUT: 580 mL / NET: 530 mL    30 Aug 2023 07:01  -  30 Aug 2023 20:44  --------------------------------------------------------  IN: 750 mL / OUT: 50 mL / NET: 700 mL        Respiratory:        LABS:                        15.0   13.12 )-----------( 154      ( 29 Aug 2023 22:54 )             44.2     08-30    140  |  105  |  22  ----------------------------<  133<H>  6.0<H>   |  25  |  0.86             MEDICATION LEVELS:     IVF FLUIDS/MEDICATIONS:   MEDICATIONS  (STANDING):  aspirin Suppository 300 milliGRAM(s) Rectal daily  atorvastatin 80 milliGRAM(s) Oral at bedtime  chlorhexidine 4% Liquid 1 Application(s) Topical daily  enoxaparin Injectable 40 milliGRAM(s) SubCutaneous <User Schedule>  polyethylene glycol 3350 17 Gram(s) Oral daily  senna 2 Tablet(s) Oral at bedtime  sodium chloride 3% + sodium acetate 50:50 1000 milliLiter(s) (50 mL/Hr) IV Continuous <Continuous>  sodium chloride 3% + sodium acetate 50:50 1000 milliLiter(s) (25 mL/Hr) IV Continuous <Continuous>    MEDICATIONS  (PRN):  acetaminophen     Tablet .. 650 milliGRAM(s) Oral every 6 hours PRN Temp greater or equal to 38C (100.4F), Mild Pain (1 - 3), Moderate Pain (4 - 6), Severe Pain (7 - 10)        IMAGING:      EXAMINATION:  PHYSICAL EXAM:    Constitutional: No Acute Distress     Neurological: Awake, alert oriented to person, place and time, Following Commands, PERRL, EOMI, No Gaze Preference, Face Symmetrical, Speech Fluent, No dysmetria, No ataxia, No nystagmus     Motor exam:          Upper extremity                         Delt     Bicep     Tricep    HG                                                 R         5/5        5/5        5/5       5/5                                               L          5/5        5/5        5/5       5/5          Lower extremity                        HF         KF        KE       DF         PF                                                  R        5/5        5/5        5/5       5/5         5/5                                               L         5/5        5/5       5/5       5/5          5/5                                                 Sensation: [ ] intact to light touch  [ ] decreased:     Reflexes: Deep Tendon Reflexes Intact     Pulmonary: Clear to Auscultation, No rales, No rhonchi, No wheezes     Cardiovascular: S1, S2, Regular rate and rhythm     Gastrointestinal: Soft, Non-tender, Non-distended     Extremities: No calf tenderness     Incision:

## 2023-08-30 NOTE — PROGRESS NOTE ADULT - ASSESSMENT
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    NEURO  Neurochecks Q1  Hydrocephalus watch   2% saline   goal sodium 145-155  F/U repeat CTH   Activity: [] OOB as tolerated [x] Bedrest [] PT [] OT [] PMNR    PULM  RA  sat > 94%    CV  Irregularly irregular  Rate controlled  SBP goal 100-160    RENAL  Fluids: 2% at 50 ccs/hour  Monitor BMP Q 8    GI  Diet: NPO  GI prophylaxis [] not indicated [] PPI [] other:  Bowel regimen [x] colace [x] senna [] other:    ENDO:   Goal euglycemia (-180)    HEME/ONC:  VTE prophylaxis: [x] SCDs [] chemoprophylaxis [] high risk of DVT/PE on admission due to:    ID  Afebrile   WC: 10.51    SOCIAL/FAMILY:  [x] updated at bedside [] family meeting    CODE STATUS:  [x] Full Code [] DNR [] DNI [] Palliative/Comfort Care    DISPOSITION:  [x] ICU [] Stroke Unit [] Floor [] EMU [] RCU [] PCU    [] Patient is at high risk of neurologic deterioration/death due to: posterior fossa hemorrhage     Time seen:  Time spent: _60__ [] critical care minutes ASSESSMENT/PLAN: 82M with a PMHx of Afib (not on AC for 1 year ), HTN, and CVAs (2021, 2022; residual deficit of dysarthria) who was 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     NEURO  Neurochecks Q1  Hydrocephalus watch   3% at 50 ml/hr, BMP q 6 hr, sodium goal 145-155  F/U repeat CTH   Activity: [] OOB as tolerated [] Bedrest [x] PT [x] OT [] PMNR    PULM  RA  sat > 94%    CV  a fib   Rate controlled  SBP goal 100-160 mmhg     RENAL  Fluids: 3% at 50 ccs/hour  Monitor BMP Q 6    GI  Diet: NPO  GI prophylaxis [] not indicated [] PPI [] other:  Bowel regimen [x] colace [x] senna [] other:    ENDO:   Goal euglycemia (-180)    HEME/ONC:  VTE prophylaxis: [x] SCDs [] chemoprophylaxis [] high risk of DVT/PE on admission due to:    ID  Afebrile   WC: 10.51    SOCIAL/FAMILY:  [x] updated at bedside [] family meeting    CODE STATUS:  [x] Full Code [] DNR [] DNI [] Palliative/Comfort Care    DISPOSITION:  [x] ICU [] Stroke Unit [] Floor [] EMU [] RCU [] PCU    [] Patient is at high risk of neurologic deterioration/death due to: posterior fossa hemorrhage     Time seen:  Time spent: _30__ [] critical care minutes

## 2023-08-31 LAB
ANION GAP SERPL CALC-SCNC: 10 MMOL/L — SIGNIFICANT CHANGE UP (ref 5–17)
ANION GAP SERPL CALC-SCNC: 12 MMOL/L — SIGNIFICANT CHANGE UP (ref 5–17)
ANION GAP SERPL CALC-SCNC: 13 MMOL/L — SIGNIFICANT CHANGE UP (ref 5–17)
ANION GAP SERPL CALC-SCNC: 16 MMOL/L — SIGNIFICANT CHANGE UP (ref 5–17)
BUN SERPL-MCNC: 20 MG/DL — SIGNIFICANT CHANGE UP (ref 7–23)
BUN SERPL-MCNC: 22 MG/DL — SIGNIFICANT CHANGE UP (ref 7–23)
CALCIUM SERPL-MCNC: 8.4 MG/DL — SIGNIFICANT CHANGE UP (ref 8.4–10.5)
CALCIUM SERPL-MCNC: 8.5 MG/DL — SIGNIFICANT CHANGE UP (ref 8.4–10.5)
CALCIUM SERPL-MCNC: 8.6 MG/DL — SIGNIFICANT CHANGE UP (ref 8.4–10.5)
CALCIUM SERPL-MCNC: 8.6 MG/DL — SIGNIFICANT CHANGE UP (ref 8.4–10.5)
CHLORIDE SERPL-SCNC: 106 MMOL/L — SIGNIFICANT CHANGE UP (ref 96–108)
CHLORIDE SERPL-SCNC: 107 MMOL/L — SIGNIFICANT CHANGE UP (ref 96–108)
CHLORIDE SERPL-SCNC: 107 MMOL/L — SIGNIFICANT CHANGE UP (ref 96–108)
CHLORIDE SERPL-SCNC: 108 MMOL/L — SIGNIFICANT CHANGE UP (ref 96–108)
CO2 SERPL-SCNC: 25 MMOL/L — SIGNIFICANT CHANGE UP (ref 22–31)
CO2 SERPL-SCNC: 25 MMOL/L — SIGNIFICANT CHANGE UP (ref 22–31)
CO2 SERPL-SCNC: 27 MMOL/L — SIGNIFICANT CHANGE UP (ref 22–31)
CO2 SERPL-SCNC: 29 MMOL/L — SIGNIFICANT CHANGE UP (ref 22–31)
CREAT SERPL-MCNC: 0.88 MG/DL — SIGNIFICANT CHANGE UP (ref 0.5–1.3)
CREAT SERPL-MCNC: 0.88 MG/DL — SIGNIFICANT CHANGE UP (ref 0.5–1.3)
CREAT SERPL-MCNC: 0.91 MG/DL — SIGNIFICANT CHANGE UP (ref 0.5–1.3)
CREAT SERPL-MCNC: 0.93 MG/DL — SIGNIFICANT CHANGE UP (ref 0.5–1.3)
EGFR: 82 ML/MIN/1.73M2 — SIGNIFICANT CHANGE UP
EGFR: 85 ML/MIN/1.73M2 — SIGNIFICANT CHANGE UP
EGFR: 86 ML/MIN/1.73M2 — SIGNIFICANT CHANGE UP
EGFR: 86 ML/MIN/1.73M2 — SIGNIFICANT CHANGE UP
GLUCOSE SERPL-MCNC: 122 MG/DL — HIGH (ref 70–99)
GLUCOSE SERPL-MCNC: 128 MG/DL — HIGH (ref 70–99)
GLUCOSE SERPL-MCNC: 158 MG/DL — HIGH (ref 70–99)
GLUCOSE SERPL-MCNC: 175 MG/DL — HIGH (ref 70–99)
HCT VFR BLD CALC: 40.4 % — SIGNIFICANT CHANGE UP (ref 39–50)
HGB BLD-MCNC: 13.3 G/DL — SIGNIFICANT CHANGE UP (ref 13–17)
MAGNESIUM SERPL-MCNC: 1.8 MG/DL — SIGNIFICANT CHANGE UP (ref 1.6–2.6)
MAGNESIUM SERPL-MCNC: 2.1 MG/DL — SIGNIFICANT CHANGE UP (ref 1.6–2.6)
MAGNESIUM SERPL-MCNC: 2.1 MG/DL — SIGNIFICANT CHANGE UP (ref 1.6–2.6)
MCHC RBC-ENTMCNC: 32.5 PG — SIGNIFICANT CHANGE UP (ref 27–34)
MCHC RBC-ENTMCNC: 32.9 GM/DL — SIGNIFICANT CHANGE UP (ref 32–36)
MCV RBC AUTO: 98.8 FL — SIGNIFICANT CHANGE UP (ref 80–100)
NRBC # BLD: 0 /100 WBCS — SIGNIFICANT CHANGE UP (ref 0–0)
PHOSPHATE SERPL-MCNC: 2.3 MG/DL — LOW (ref 2.5–4.5)
PHOSPHATE SERPL-MCNC: 2.7 MG/DL — SIGNIFICANT CHANGE UP (ref 2.5–4.5)
PHOSPHATE SERPL-MCNC: 3.1 MG/DL — SIGNIFICANT CHANGE UP (ref 2.5–4.5)
PLATELET # BLD AUTO: 164 K/UL — SIGNIFICANT CHANGE UP (ref 150–400)
POTASSIUM SERPL-MCNC: 3.6 MMOL/L — SIGNIFICANT CHANGE UP (ref 3.5–5.3)
POTASSIUM SERPL-MCNC: 3.7 MMOL/L — SIGNIFICANT CHANGE UP (ref 3.5–5.3)
POTASSIUM SERPL-MCNC: 4 MMOL/L — SIGNIFICANT CHANGE UP (ref 3.5–5.3)
POTASSIUM SERPL-MCNC: 4.2 MMOL/L — SIGNIFICANT CHANGE UP (ref 3.5–5.3)
POTASSIUM SERPL-SCNC: 3.6 MMOL/L — SIGNIFICANT CHANGE UP (ref 3.5–5.3)
POTASSIUM SERPL-SCNC: 3.7 MMOL/L — SIGNIFICANT CHANGE UP (ref 3.5–5.3)
POTASSIUM SERPL-SCNC: 4 MMOL/L — SIGNIFICANT CHANGE UP (ref 3.5–5.3)
POTASSIUM SERPL-SCNC: 4.2 MMOL/L — SIGNIFICANT CHANGE UP (ref 3.5–5.3)
RBC # BLD: 4.09 M/UL — LOW (ref 4.2–5.8)
RBC # FLD: 12.8 % — SIGNIFICANT CHANGE UP (ref 10.3–14.5)
SODIUM SERPL-SCNC: 144 MMOL/L — SIGNIFICANT CHANGE UP (ref 135–145)
SODIUM SERPL-SCNC: 146 MMOL/L — HIGH (ref 135–145)
SODIUM SERPL-SCNC: 147 MMOL/L — HIGH (ref 135–145)
SODIUM SERPL-SCNC: 148 MMOL/L — HIGH (ref 135–145)
WBC # BLD: 10.55 K/UL — HIGH (ref 3.8–10.5)
WBC # FLD AUTO: 10.55 K/UL — HIGH (ref 3.8–10.5)

## 2023-08-31 PROCEDURE — 99291 CRITICAL CARE FIRST HOUR: CPT

## 2023-08-31 PROCEDURE — 99222 1ST HOSP IP/OBS MODERATE 55: CPT

## 2023-08-31 PROCEDURE — 70450 CT HEAD/BRAIN W/O DYE: CPT | Mod: 26

## 2023-08-31 RX ORDER — MAGNESIUM SULFATE 500 MG/ML
2 VIAL (ML) INJECTION ONCE
Refills: 0 | Status: COMPLETED | OUTPATIENT
Start: 2023-08-31 | End: 2023-08-31

## 2023-08-31 RX ORDER — POTASSIUM CHLORIDE 20 MEQ
40 PACKET (EA) ORAL ONCE
Refills: 0 | Status: DISCONTINUED | OUTPATIENT
Start: 2023-08-31 | End: 2023-08-31

## 2023-08-31 RX ORDER — POTASSIUM PHOSPHATE, MONOBASIC POTASSIUM PHOSPHATE, DIBASIC 236; 224 MG/ML; MG/ML
30 INJECTION, SOLUTION INTRAVENOUS ONCE
Refills: 0 | Status: COMPLETED | OUTPATIENT
Start: 2023-08-31 | End: 2023-08-31

## 2023-08-31 RX ORDER — SODIUM,POTASSIUM PHOSPHATES 278-250MG
1 POWDER IN PACKET (EA) ORAL ONCE
Refills: 0 | Status: COMPLETED | OUTPATIENT
Start: 2023-08-31 | End: 2023-08-31

## 2023-08-31 RX ORDER — ASPIRIN/CALCIUM CARB/MAGNESIUM 324 MG
81 TABLET ORAL DAILY
Refills: 0 | Status: DISCONTINUED | OUTPATIENT
Start: 2023-08-31 | End: 2023-09-02

## 2023-08-31 RX ORDER — SIMETHICONE 80 MG/1
80 TABLET, CHEWABLE ORAL DAILY
Refills: 0 | Status: COMPLETED | OUTPATIENT
Start: 2023-08-31 | End: 2023-09-01

## 2023-08-31 RX ORDER — POTASSIUM CHLORIDE 20 MEQ
40 PACKET (EA) ORAL ONCE
Refills: 0 | Status: COMPLETED | OUTPATIENT
Start: 2023-08-31 | End: 2023-08-31

## 2023-08-31 RX ORDER — PSYLLIUM SEED (WITH DEXTROSE)
1 POWDER (GRAM) ORAL EVERY 24 HOURS
Refills: 0 | Status: DISCONTINUED | OUTPATIENT
Start: 2023-08-31 | End: 2023-09-03

## 2023-08-31 RX ORDER — AMLODIPINE BESYLATE 2.5 MG/1
5 TABLET ORAL DAILY
Refills: 0 | Status: DISCONTINUED | OUTPATIENT
Start: 2023-08-31 | End: 2023-09-03

## 2023-08-31 RX ORDER — POLYETHYLENE GLYCOL 3350 17 G/17G
17 POWDER, FOR SOLUTION ORAL
Refills: 0 | Status: DISCONTINUED | OUTPATIENT
Start: 2023-08-31 | End: 2023-09-01

## 2023-08-31 RX ORDER — HYDRALAZINE HCL 50 MG
5 TABLET ORAL ONCE
Refills: 0 | Status: COMPLETED | OUTPATIENT
Start: 2023-08-31 | End: 2023-08-31

## 2023-08-31 RX ORDER — SODIUM CHLORIDE 5 G/100ML
1000 INJECTION, SOLUTION INTRAVENOUS
Refills: 0 | Status: DISCONTINUED | OUTPATIENT
Start: 2023-08-31 | End: 2023-09-01

## 2023-08-31 RX ADMIN — POTASSIUM PHOSPHATE, MONOBASIC POTASSIUM PHOSPHATE, DIBASIC 83.33 MILLIMOLE(S): 236; 224 INJECTION, SOLUTION INTRAVENOUS at 04:23

## 2023-08-31 RX ADMIN — Medication 1 PACKET(S): at 21:31

## 2023-08-31 RX ADMIN — AMLODIPINE BESYLATE 5 MILLIGRAM(S): 2.5 TABLET ORAL at 12:41

## 2023-08-31 RX ADMIN — Medication 5 MILLIGRAM(S): at 07:54

## 2023-08-31 RX ADMIN — CHLORHEXIDINE GLUCONATE 1 APPLICATION(S): 213 SOLUTION TOPICAL at 12:41

## 2023-08-31 RX ADMIN — ENOXAPARIN SODIUM 40 MILLIGRAM(S): 100 INJECTION SUBCUTANEOUS at 17:52

## 2023-08-31 RX ADMIN — ATORVASTATIN CALCIUM 80 MILLIGRAM(S): 80 TABLET, FILM COATED ORAL at 21:31

## 2023-08-31 RX ADMIN — Medication 81 MILLIGRAM(S): at 12:41

## 2023-08-31 RX ADMIN — Medication 40 MILLIEQUIVALENT(S): at 17:52

## 2023-08-31 RX ADMIN — SODIUM CHLORIDE 50 MILLILITER(S): 5 INJECTION, SOLUTION INTRAVENOUS at 22:23

## 2023-08-31 RX ADMIN — SODIUM CHLORIDE 25 MILLILITER(S): 5 INJECTION, SOLUTION INTRAVENOUS at 07:55

## 2023-08-31 RX ADMIN — SODIUM CHLORIDE 50 MILLILITER(S): 5 INJECTION, SOLUTION INTRAVENOUS at 07:55

## 2023-08-31 RX ADMIN — Medication 25 GRAM(S): at 02:01

## 2023-08-31 NOTE — SWALLOW BEDSIDE ASSESSMENT ADULT - COMMENTS
Neurologic Exam:  Mental status - Eyes closed but opens to voice stimuli, oriented to person, place, and time. Speech fluent but mildly dysarthric. Repetition and naming intact. Follows simple commands.    8/28 CT Brain - IMPRESSION: Chronic left parietotemporal infarct. No intracranial hemorrhage.  LKN: 0000 8/28/23  NIHSS: 1 (dysarthria)   mRS: 1   Neuro IMPRESSION: Acute onset of vertigo likely 2/2 cerebellar vs. brainstem dysfunction due to acute ischemic stroke. Mechanism of stroke potentially cardioembolic due to Afib.  8/28 @ 0152 - failed dysphagia screen  Bedside swallow evaluation completed 8/28-> recommendations at that time included easy to chew and thin liquids  CTH on 8/29 demonstrated hemorrhagic conversion R cerebellar stroke + mass effect on 4th vent when compared to previous CT head.   Pt transferred to NSCU. Difficulty noted in swallowing, patient made NPO and a new consult was placed for reassessment of swallow. +impulsivity despite verbal cues requiring supervision with po intake

## 2023-08-31 NOTE — SWALLOW BEDSIDE ASSESSMENT ADULT - PHARYNGEAL PHASE
multiple swallows and dis-coordination of pharyngeal swallow when drinking via cup,/Delayed pharyngeal swallow/Decreased laryngeal elevation Delayed pharyngeal swallow/Decreased laryngeal elevation Delayed pharyngeal swallow/Throat clear post oral intake

## 2023-08-31 NOTE — SWALLOW BEDSIDE ASSESSMENT ADULT - SWALLOW EVAL: RECOMMENDED FEEDING/EATING TECHNIQUES
allow for swallow between intakes/alternate food with liquid/check mouth frequently for oral residue/pocketing/maintain upright posture during/after eating for 30 mins/position upright (90 degrees)/small sips/bites
crush medication (when feasible)/maintain upright posture during/after eating for 30 mins/no straws/oral hygiene/position upright (90 degrees)

## 2023-08-31 NOTE — DIETITIAN INITIAL EVALUATION ADULT - REASON FOR ADMISSION
Pt is an 81 yo M on Eliquis, HTN, CVA (2021, 2022 with residual dysarthria). Presented with vertigo. MRI with acute R anterior cerebellar infarct and internal heme, chronic L cerebellar and parieto-temporal infarct. CTH 8/29 with hemorrhagic conversion R cerebellar stroke and mass effect on 4th ventricle compared. CTA significant for basilar clot.

## 2023-08-31 NOTE — DIETITIAN INITIAL EVALUATION ADULT - ETIOLOGY
worsening neurological exam deferring ability of pt consume adequate energy/protein worsening neurological exam deferring ability of pt to consume adequate energy/protein

## 2023-08-31 NOTE — PROGRESS NOTE ADULT - ASSESSMENT
Being tired makes it harder to handle your emotions. Go for walks with your baby. Talk to your partner, friends, and family about your feelings. If your symptoms last for more than a few weeks, or if you feel very depressed, ask your doctor for help. Postpartum depression can be treated. Support groups and counseling can help. Sometimes medicine can also help. Stay healthy  Eat healthy foods so you have more energy. If you breastfeed, avoid drugs. If you quit smoking during pregnancy, try to stay smoke-free. If you choose to have a drink now and then, have only one drink, and limit the number of occasions that you have a drink. Wait to breastfeed at least 2 hours after you have a drink to reduce the amount of alcohol the baby may get in the milk. Start daily exercise after 4 to 6 weeks, but rest when you feel tired. Learn exercises to tone your belly. Try Kegel exercises to regain strength in your pelvic muscles. You can do these exercises while you stand or sit. (If doing these exercises causes pain, stop doing them and talk with your doctor.)  Squeeze your muscles as if you were trying not to pass gas. Or squeeze your muscles as if you were stopping the flow of urine. Your belly, legs, and buttocks shouldn't move. Hold the squeeze for 3 seconds, then relax for 5 to 10 seconds. Start with 3 seconds, then add 1 second each week until you are able to squeeze for 10 seconds. Repeat the exercise 10 times a session. Do 3 to 8 sessions a day. Find a class for you and your baby that has an exercise time. If you had a  birth, give yourself a bit more time before you exercise, and be careful. When should you call for help? Share this information with your partner, family, or a friend. They can help you watch for warning signs. Call 911  anytime you think you may need emergency care. For example, call if:    You have thoughts of harming yourself, your baby, or another person.      You passed out (lost 82M with a PMHx of Afib (not on AC for 1 year ), HTN, and CVAs (2021, 2022; residual deficit of dysarthria) who was 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  08/28: Acute infarct in the right anterior cerebellum with associated internal hemorrhagic blood products. CTH: Evolving acute right cerebellar infarct. Eliquis held.   8/29 transferred to the NSCU for worsening neuro exam   Family at bedside who provided all information.   Does not follow with a cardiologist.

## 2023-08-31 NOTE — PROGRESS NOTE ADULT - ASSESSMENT
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     NEURO  Neurochecks Q2  Hydrocephalus watch   3% at 50 ml/hr, BMP q 6 hr, sodium goal 145-155  repeat CTH: stable in AM   ASA 81 mg PO  As per neuro MRI to be ordered  Activity: [] OOB as tolerated [] Bedrest [x] PT [x] OT [] PMNR    PULM  4L NC  sat > 94%    CV  a fib   Rate controlled  SBP goal 100-160 mmhg  continue amlodipine and lipitor      RENAL  Fluids: 3% at 50 ccs/hour  Monitor BMP Q 6    GI  Diet: Puree diet  GI prophylaxis [] not indicated [] PPI [] other:  Bowel regimen [x] colace [x] senna [] other:    ENDO:   Goal euglycemia (-180)    HEME/ONC:  VTE prophylaxis: [] SCDs [x] chemoprophylaxis [] high risk of DVT/PE on admission due to:  LE doppler negative 8/30    ID  Afebrile   WC: 10.55    SOCIAL/FAMILY:  [x] updated at bedside [] family meeting    CODE STATUS:  [x] Full Code [] DNR [] DNI [] Palliative/Comfort Care    DISPOSITION:  [x] ICU [] Stroke Unit [] Floor [] EMU [] RCU [] PCU    [] Patient is at high risk of neurologic deterioration/death due to: posterior fossa hemorrhage     Time seen:  Time spent: _30__ [] critical care minutes

## 2023-08-31 NOTE — PROGRESS NOTE ADULT - CRITICAL CARE ATTENDING COMMENT
Advanced care planning was discussed with family.  Advanced care planning forms were reviewed and discussed as appropriate.  Differential diagnosis and plan of care discussed with family after the evaluation.   Pain assessed and judicious use of narcotics when appropriate was discussed.  Importance of Fall prevention discussed.  Counseling on Smoking and Alcohol cessation was offered when appropriate.  Counseling on Diet, exercise, and medication compliance was done.
Reviewed above plan of care with Family; MRI Brain , if neuroimaging and clinical status stable then  tx to stroke unit on 09/01
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, with hemorrhagic trasnformation, cytotoxic edema with compression of 4 the ventricle , on eliquis for basilar occlusion, hydro watch, keep sodium 145-155, 3% 50 cc/hr,  repeat CT head today stable, pending official report, will have to hole eliquis now given hemorrhagic transformation, neuro checks q 1 hr, RA, CCD diet, GERALDINE, aovid nephrotoxic medications, a fib , SBP goal 100-160 mmhg, RA, can start lovenox 40 mg sc qhs

## 2023-08-31 NOTE — SWALLOW BEDSIDE ASSESSMENT ADULT - SLP GENERAL OBSERVATIONS
2
Pt encountered bedside, +2LO2NC; daughter, May, at bedside. Video language line  utilized in Mandarin, ID#[id number]. Per daughter, pt has residual deficits in speech/thinking after CVA in [place] 1 year prior. Pt inconsistently able to follow 1-step directives. Pt AA&Ox2 (person, place). Pt is able to make simple wants/needs c/o pain known.
Pt found in bed, awake and alert. Family present and requesting to assist with interpretation during exam. Mandarin  Julee #[id number] utilized, along with grand-daughter. Pt follows simple 1-step commands for evaluation purposes, requiring some re-direction to task, family endorses patient is "difficult to understand". Pt on 2L nasla cannula, O2 saturation 89%-91%, RN Mae alerted who reports patient on 2-4 liters last night. RN adjusted oxygen to 4L nasal cannula. O2 saturation improved to 97% throughout eval.

## 2023-08-31 NOTE — SWALLOW BEDSIDE ASSESSMENT ADULT - SLP PERTINENT HISTORY OF CURRENT PROBLEM
82M R-handed PMHx Afib (not on AC), HTN, and strokes (2021, 2022; residual deficit of dysarthria) presenting with sudden onset "dizziness." Pt was lying down in his bed getting ready to sleep when he noticed sudden onset of "room-spinning sensation." No prior h/o similar symptoms. No fever, chills, HA, nausea or vomiting. No focal numbness or weakness at the time of symptom onset. Symptoms continued to persist therefore, visited ED. Code stroke activated upon arrival. LKN: 0000 8/28/23. NIHSS: 1 (dysarthria). mRS: 1.
82M R-handed PMHx Afib (not on AC), HTN, and strokes (2021, 2022; residual deficit of dysarthria) presenting with sudden onset "dizziness." Pt was lying down in his bed getting ready to sleep when he noticed sudden onset of "room-spinning sensation." No prior h/o similar symptoms. No fever, chills, HA, nausea or vomiting. No focal numbness or weakness at the time of symptom onset. Symptoms continued to persist therefore, visited ED. Code stroke activated upon arrival. LKN: 0000 8/28/23. NIHSS: 1 (dysarthria). mRS: 1.

## 2023-08-31 NOTE — SWALLOW BEDSIDE ASSESSMENT ADULT - ASR SWALLOW ASPIRATION MONITOR
change of breathing pattern/cough/gurgly voice/fever/pneumonia/throat clearing/upper respiratory infection
Monitor for s/s aspiration/laryngeal penetration. If noted:  D/C p.o. intake, provide non-oral nutrition/hydration/meds, and contact this service @ [phone]/change of breathing pattern/cough/gurgly voice/fever/pneumonia/throat clearing/upper respiratory infection

## 2023-08-31 NOTE — SWALLOW BEDSIDE ASSESSMENT ADULT - ADDITIONAL RECOMMENDATIONS
LTG: Pt will tolerate the least restrictive diet without overt signs or symptoms of penetration or aspiration
Monitor for s/s aspiration/laryngeal penetration. If noted:  D/C p.o. intake, provide non-oral nutrition/hydration/meds, and contact this service @ [phone]  Maintain good oral hygiene  LTG: Pt will tolerate the least restrictive diet without overt signs or symptoms of penetration or aspiration

## 2023-08-31 NOTE — PROGRESS NOTE ADULT - SUBJECTIVE AND OBJECTIVE BOX
HOSPITAL COURSE: 82M with a PMHx of Afib (not on AC for 1 year ), HTN, and CVAs (2021, 2022; residual deficit of dysarthria) who presented with a chief complaint of dizziness. Patient during the admission stated that the room as spinning around him. During admission denied any CP/SOB/N/V.     As per admitting note: LKN: 0000 8/28/23. NIHSS: 1 (dysarthria). mRS: 1.     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  08/28: Acute infarct in the right anterior cerebellum with associated internal hemorrhagic blood products. CTH: Evolving acute right cerebellar infarct. Eliquis held.   8/29 transferred to the NSCU for worsening neuro exam   8/30 Stable CT Head on hypertonics  8/31 Stable, Repeat CTH stable, monitor Na on 3% @ 75    24 hour Events:       Allergies    No Known Allergies    Intolerances        REVIEW OF SYSTEMS: [ ] Unable to Assess due to neurologic exam   [X] All ROS addressed below are non-contributory, except:  Neuro: [ ] Headache [ ] Back pain [ ] Numbness [ ] Weakness [ ] Ataxia [ ] Dizziness [ ] Aphasia [ ] Dysarthria [ ] Visual disturbance  Resp: [ ] Shortness of breath/dyspnea, [ ] Orthopnea [ ] Cough  CV: [ ] Chest pain [ ] Palpitation [ ] Lightheadedness [ ] Syncope  Renal: [ ] Thirst [ ] Edema  GI: [ ] Nausea [ ] Emesis [ ] Abdominal pain [ ] Constipation [ ] Diarrhea  Hem: [ ] Hematemesis [ ] bright red blood per rectum  ID: [ ] Fever [ ] Chills [ ] Dysuria  ENT: [ ] Rhinorrhea      DEVICES:   [ ] Restraints [ ] ET tube [ ] central line [ ] arterial line [ ] pretty [ ] NGT/OGT [ ] EVD [ ] LD [ ] NIMESH/HMV [ ] Trach [ ] PEG [ ] Chest Tube     VITALS:   Vital Signs Last 24 Hrs  T(C): 36.9 (31 Aug 2023 15:00), Max: 37.6 (31 Aug 2023 03:00)  T(F): 98.4 (31 Aug 2023 15:00), Max: 99.7 (31 Aug 2023 03:00)  HR: 69 (31 Aug 2023 18:00) (60 - 83)  BP: 156/78 (31 Aug 2023 18:00) (143/52 - 179/83)  BP(mean): 99 (31 Aug 2023 18:00) (79 - 114)  RR: 28 (31 Aug 2023 18:00) (13 - 31)  SpO2: 96% (31 Aug 2023 18:00) (90% - 100%)    Parameters below as of 31 Aug 2023 07:00  Patient On (Oxygen Delivery Method): nasal cannula  O2 Flow (L/min): 2    CAPILLARY BLOOD GLUCOSE        I&O's Summary    30 Aug 2023 07:01  -  31 Aug 2023 07:00  --------------------------------------------------------  IN: 1799.9 mL / OUT: 750 mL / NET: 1049.9 mL    31 Aug 2023 07:01  -  31 Aug 2023 19:37  --------------------------------------------------------  IN: 1225 mL / OUT: 400 mL / NET: 825 mL        Respiratory:        LABS:                        13.3   10.55 )-----------( 164      ( 31 Aug 2023 00:39 )             40.4     08-31    146<H>  |  107  |  22  ----------------------------<  158<H>  3.6   |  27  |  0.88             MEDICATION LEVELS:     IVF FLUIDS/MEDICATIONS:   MEDICATIONS  (STANDING):  amLODIPine   Tablet 5 milliGRAM(s) Oral daily  aspirin  chewable 81 milliGRAM(s) Oral daily  atorvastatin 80 milliGRAM(s) Oral at bedtime  chlorhexidine 4% Liquid 1 Application(s) Topical daily  enoxaparin Injectable 40 milliGRAM(s) SubCutaneous <User Schedule>  polyethylene glycol 3350 17 Gram(s) Oral two times a day  psyllium Powder 1 Packet(s) Oral every 24 hours  senna 2 Tablet(s) Oral at bedtime  simethicone 80 milliGRAM(s) Chew daily  sodium chloride 3% + sodium acetate 50:50 1000 milliLiter(s) (50 mL/Hr) IV Continuous <Continuous>    MEDICATIONS  (PRN):  acetaminophen     Tablet .. 650 milliGRAM(s) Oral every 6 hours PRN Temp greater or equal to 38C (100.4F), Mild Pain (1 - 3), Moderate Pain (4 - 6), Severe Pain (7 - 10)        IMAGING: reviewed      EXAMINATION:  PHYSICAL EXAM:    Constitutional: No Acute Distress     Neurological: Awake, alert oriented to person, place and time, Following Commands, PERRL, EOMI, No Gaze Preference, Face Symmetrical, Speech Fluent, No dysmetria, No ataxia, No nystagmus     Motor exam:          Upper extremity                         Delt     Bicep     Tricep    HG                                                 R         5/5        5/5        5/5       5/5                                               L          5/5        5/5        5/5       5/5          Lower extremity                        HF         KF        KE       DF         PF                                                  R        5/5        5/5        5/5       5/5         5/5                                               L         5/5        5/5       5/5       5/5          5/5                                                 Sensation: [ ] intact to light touch  [ ] decreased:     Reflexes: Deep Tendon Reflexes Intact     Pulmonary: Clear to Auscultation, No rales, No rhonchi, No wheezes     Cardiovascular: S1, S2, Regular rate and rhythm     Gastrointestinal: Soft, Non-tender, Non-distended     Extremities: No calf tenderness     Incision:

## 2023-08-31 NOTE — PROGRESS NOTE ADULT - ASSESSMENT
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     NEURO  Neurochecks Q1  Hydrocephalus watch   3% at 75 ml/hr, BMP q 6 hr, sodium goal 145-155  F/U repeat CTH in AM  Activity: [] OOB as tolerated [] Bedrest [x] PT [x] OT [] PMNR    PULM  RA  sat > 94%    CV  a fib   Rate controlled  SBP goal 100-160 mmhg     RENAL  Fluids: 3% at 75 ccs/hour  Monitor BMP Q 6    GI  Diet: NPO  GI prophylaxis [] not indicated [] PPI [] other:  Bowel regimen [x] colace [x] senna [] other:    ENDO:   Goal euglycemia (-180)    HEME/ONC:  VTE prophylaxis: [x] SCDs [] chemoprophylaxis [] high risk of DVT/PE on admission due to:  LE doppler negative 8/30    ID  Afebrile   WC: 10.51    SOCIAL/FAMILY:  [x] updated at bedside [] family meeting    CODE STATUS:  [x] Full Code [] DNR [] DNI [] Palliative/Comfort Care    DISPOSITION:  [x] ICU [] Stroke Unit [] Floor [] EMU [] RCU [] PCU    [] Patient is at high risk of neurologic deterioration/death due to: posterior fossa hemorrhage     Time seen:  Time spent: _30__ [] critical care minutes ASSESSMENT/PLAN: 82M with a PMHx of Afib (not on AC for 1 year ), HTN, and CVAs (2021, 2022; residual deficit of dysarthria) who was 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     NEURO  Neurochecks Q2  Hydrocephalus watch   3% at 75 ml/hr, BMP q 6 hr, sodium goal 145-155  repeat CTH: stable  ASA 81 mg PO  As per neuro MRI to be ordered  Activity: [] OOB as tolerated [] Bedrest [x] PT [x] OT [] PMNR    PULM  4L NC  sat > 94%    CV  a fib   Rate controlled  SBP goal 100-160 mmhg     RENAL  Fluids: 3% at 50 ccs/hour  Monitor BMP Q 6    GI  Diet: Puree diet  GI prophylaxis [] not indicated [] PPI [] other:  Bowel regimen [x] colace [x] senna [] other:    ENDO:   Goal euglycemia (-180)    HEME/ONC:  VTE prophylaxis: [] SCDs [x] chemoprophylaxis [] high risk of DVT/PE on admission due to:  LE doppler negative 8/30    ID  Afebrile   WC: 10.55    SOCIAL/FAMILY:  [x] updated at bedside [] family meeting    CODE STATUS:  [x] Full Code [] DNR [] DNI [] Palliative/Comfort Care    DISPOSITION:  [x] ICU [] Stroke Unit [] Floor [] EMU [] RCU [] PCU    [] Patient is at high risk of neurologic deterioration/death due to: posterior fossa hemorrhage     Time seen:  Time spent: _30__ [] critical care minutes

## 2023-08-31 NOTE — DIETITIAN INITIAL EVALUATION ADULT - ORAL INTAKE PTA/DIET HISTORY
-Spoke with granddaughter at bedside; able to assist in translation (pt Mandarin speaking).   -Per granddaughter, pt with history of very good appetite/PO intake. Consumed 2-3 balanced meals daily and enjoyed a variety of different foods.   -Denies prior intolerance to chewing/swallowing, denies food allergies. Reports taking daily CoQ10.   -Denies baseline nausea/vomiting, constipation/diarrhea.

## 2023-08-31 NOTE — DIETITIAN INITIAL EVALUATION ADULT - ADD RECOMMEND
1. Defer advancement of PO diet to medical team. If warranted, consider no therapeutic restrictions at this time. Defer consistency to medical team, SLP.   2. As able, encourage and monitor PO intake. Encourage use of daily menus if PO diet warranted. [place] dietary preferences as expressed as able.  3. Recommend multivitamin (if no medication contraindications) to aid in prevention of micronutrient deficiencies.   4. If EN feeds warranted, may consider:   5. Monitor wt trends/labs/skin integrity/hydration status/bowel regularity.  1. Defer advancement of PO diet to medical team. If warranted, consider no therapeutic restrictions at this time. Defer consistency to medical team, SLP.   2. As able, encourage and monitor PO intake. Encourage use of daily menus if PO diet warranted. [place] dietary preferences as expressed as able.  3. Recommend multivitamin (if no medication contraindications) to aid in prevention of micronutrient deficiencies.   4. If EN feeds warranted, may consider: Jevity 1.5   5. Monitor wt trends/labs/skin integrity/hydration status/bowel regularity.  1. Defer advancement of PO diet to medical team. If warranted, consider no therapeutic restrictions at this time. Defer consistency to medical team, SLP.   2. As able, encourage and monitor PO intake. Encourage use of daily menus if PO diet warranted. [place] dietary preferences as expressed as able.  3. Recommend multivitamin (if no medication contraindications) to aid in prevention of micronutrient deficiencies.   4. If EN feeds warranted, may consider: Jevity 1.5 at 80ml/hr x 18 hrs. To provide (based on dosing wt 87.8kg): 1440ml, 2160kcal (24.6kcal/kg) and 92g protein (1.0g protein/kg).    5. Monitor wt trends/labs/skin integrity/hydration status/bowel regularity.

## 2023-08-31 NOTE — PROGRESS NOTE ADULT - ASSESSMENT
82F h/o Afib on Eliquis (last this AM), HTN, CVA (2021,22 resid dysarth) p/f vertigo. NIHSS 1 on arrival. MRI yday w/ acute R ant cerebellar infarct + internal heme on SWI, chronic L cerebell + parieto-temporal infarct. CTH 8/29 w/ hemorrhagic conv R cerebellar stroke + mass effect on 4th vent compared to CTH yday. CTA sig for basilar clot. Coags 8/28 wnl before Eliquis. Na 140.  Exam: AOx2-3, no drift, dysarthria at baseline, BLAKE 5/5, SILT      -CTH AM  -Na goals 145-155; latest 144  - Hold Eliquis  - Hold off on reversal for now given basilar artery clot  -S&S-p  -Watch fluids iso cardiac Hx  -Cards following- when stable plan for Watchmans device  -CPAP at night- neuro sleep trail

## 2023-08-31 NOTE — PROGRESS NOTE ADULT - TIME BILLING
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

## 2023-08-31 NOTE — SWALLOW BEDSIDE ASSESSMENT ADULT - SWALLOW EVAL: ORAL MUSCULATURE
somewhat limited due to reduced command following for multi-step commands/generally intact
unable to assess due to poor participation/comprehension

## 2023-08-31 NOTE — CONSULT NOTE ADULT - SUBJECTIVE AND OBJECTIVE BOX
HPI:  82M R-handed PMHx Afib (not on AC), HTN, and strokes (2021, 2022; residual deficit of dysarthria) presenting with sudden onset "dizziness." Pt was lying down in his bed getting ready to sleep when he noticed sudden onset of "room-spinning sensation." No prior h/o similar symptoms. No fever, chills, HA, nausea or vomiting. No focal numbness or weakness at the time of symptom onset. Symptoms continued to persist therefore, visited ED. Code stroke activated upon arrival. LKN: 0000 8/28/23. NIHSS: 1 (dysarthria). mRS: 1.    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 Patient requiring suctioning for oral secretions, continues to feel dizziness.     REVIEW OF SYSTEMS  Constitutional - No fever, No weight loss, No fatigue  Respiratory - No cough, No wheezing, No shortness of breath  Cardiovascular - No chest pain, No palpitations  Gastrointestinal - No abdominal pain, No nausea, No vomiting, No diarrhea, No constipation  Genitourinary - No dysuria, No frequency, No hematuria, No incontinence  Psychiatric - No depression, No anxiety    VITALS  T(C): 36.9 (08-31-23 @ 07:00), Max: 37.6 (08-31-23 @ 03:00)  HR: 70 (08-31-23 @ 10:00) (60 - 80)  BP: 162/76 (08-31-23 @ 10:00) (143/52 - 167/73)  RR: 20 (08-31-23 @ 10:00) (13 - 31)  SpO2: 97% (08-31-23 @ 10:00) (90% - 100%)  Wt(kg): --    PAST MEDICAL & SURGICAL HISTORY  Hypertension    Stroke    Atrial fibrillation        SOCIAL HISTORY  Smoking - Denied  EtOH - Denied   Drugs - Denied    FUNCTIONAL HISTORY  Lives with family, 2 steps to enter  Independent ADLs, used cane as eneded     CURRENT FUNCTIONAL STATUS  8/31 SLP  oropharyngeal dysphagia  puree, mod thick via teaspoon    8/28 SLP  cognitive deficits as receptive/expressive deficits  residual from past CVA    8/30 PT  bed mobility min assist  transfers mod assist   gait max assist x 3 steps    8/29 OT  lethargic   bed mobility mod to max assist  transfers mod assist with RW      FAMILY HISTORY   no pertinent history in first degree relatives     RECENT LABS/IMAGING  CBC Full  -  ( 31 Aug 2023 00:39 )  WBC Count : 10.55 K/uL  RBC Count : 4.09 M/uL  Hemoglobin : 13.3 g/dL  Hematocrit : 40.4 %  Platelet Count - Automated : 164 K/uL  Mean Cell Volume : 98.8 fl  Mean Cell Hemoglobin : 32.5 pg  Mean Cell Hemoglobin Concentration : 32.9 gm/dL  Auto Neutrophil # : x  Auto Lymphocyte # : x  Auto Monocyte # : x  Auto Eosinophil # : x  Auto Basophil # : x  Auto Neutrophil % : x  Auto Lymphocyte % : x  Auto Monocyte % : x  Auto Eosinophil % : x  Auto Basophil % : x    08-31    148<H>  |  107  |  22  ----------------------------<  128<H>  4.2   |  25  |  0.93    Ca    8.6      31 Aug 2023 06:04  Phos  2.3     08-31  Mg     1.8     08-31      Urinalysis Basic - ( 31 Aug 2023 06:04 )    Color: x / Appearance: x / SG: x / pH: x  Gluc: 128 mg/dL / Ketone: x  / Bili: x / Urobili: x   Blood: x / Protein: x / Nitrite: x   Leuk Esterase: x / RBC: x / WBC x   Sq Epi: x / Non Sq Epi: x / Bacteria: x    < from: CT Head No Cont (08.31.23 @ 09:25) >    IMPRESSION:  Right cerebellar PICA distribution of evolving infarct with some subtle   associated hemorrhage minimally more conspicuous compared with the prior   without a focal clot. Subtle mass effect on the fourth ventricle. Chronic   appearing left temporal parietal infarct.      < end of copied text >      ALLERGIES  No Known Allergies      MEDICATIONS   acetaminophen     Tablet .. 650 milliGRAM(s) Oral every 6 hours PRN  aspirin Suppository 300 milliGRAM(s) Rectal daily  atorvastatin 80 milliGRAM(s) Oral at bedtime  chlorhexidine 4% Liquid 1 Application(s) Topical daily  enoxaparin Injectable 40 milliGRAM(s) SubCutaneous <User Schedule>  polyethylene glycol 3350 17 Gram(s) Oral daily  senna 2 Tablet(s) Oral at bedtime  sodium chloride 3% + sodium acetate 50:50 1000 milliLiter(s) IV Continuous <Continuous>  sodium chloride 3% + sodium acetate 50:50 1000 milliLiter(s) IV Continuous <Continuous>      ----------------------------------------------------------------------------------------  PHYSICAL EXAM  Constitutional - NAD, Comfortable, in bed   Chest - Breathing comfortably, O2 by NC   Cardiovascular - S1S2   Abdomen - Soft   Extremities - No C/C/E, No calf tenderness   Neurologic Exam - follows commands                    Cognitive - Awake, Alert, AAO to self, place, date, year, situation     Communication - responds to questions         Motor -moves all ext      Sensory - Intact to LT    Psychiatric - Mood stable, Affect WNL  ----------------------------------------------------------------------------------------  ASSESSMENT/PLAN  82yMale h/o afib not on AC, CVA in [place] with functional deficits after right cerebellar infarct, hemorrhagic transformation  on hydrocephalus watch, 3%  dysphagia, puree diet  Pain - Tylenol  DVT PPX - SCDs lovenox   Rehab - Will continue to follow for ongoing rehab needs and recommendations.    Recommend ACUTE inpatient rehabilitation for the functional deficits consisting of 3 hours of therapy/day & 24 hour RN/daily PMR physician for comorbid medical management. Patient will be able to tolerate 3 hours a day.  
Neurology - Consult Note    Spectra: [phone] ([facility]), [phone] ([facility])    HPI:  82M PMHx Afib (not on AC), HTN, and strokes (2021, 2022; residual deficit of dysarthria) presenting with sudden onset HA and "dizziness."     Review of Systems:  INCOMPLETE   CONSTITUTIONAL: No fevers or chills  EYES AND ENT: No visual changes or no throat pain   NECK: No pain or stiffness  RESPIRATORY: No shortness of breath  CARDIOVASCULAR: No chest pain or palpitations  GASTROINTESTINAL: No nausea or vomiting   GENITOURINARY: No dysuria  NEUROLOGICAL: +As stated in HPI above  SKIN: No itching, burning, rashes, or lesions   All other review of systems is negative unless indicated above.    Allergies:  Allergy Status Unknown      PMHx/PSHx/Family Hx: As above, otherwise see below       Social Hx:  Never smoker; no current use of tobacco, alcohol, or illicit drugs  Lives with ***; occupation ***, baseline functional status is ***    Medications:  MEDICATIONS  (STANDING):    MEDICATIONS  (PRN):      Vitals:  T(C): --  HR: --  BP: --  RR: --  SpO2: --    Physical Examination: INCOMPLETE  General - non-toxic appearing male/female in no acute distress  Cardiovascular - peripheral pulses palpable, no edema  Respiratory - breathing comfortably with no increased work of breathing    Neurologic Exam:  Mental status - Awake, Alert, Oriented to person, place, and time. Speech fluent, repetition and naming intact. Follows simple and complex commands. Attention/concentration, recent and remote memory (including registration 3/3 and recall 3/3), and fund of knowledge intact    Cranial nerves - PERRLA (4mm -> 3mm b/l), VFF, EOMI, face sensation (V1-V3) intact b/l, facial strength intact without asymmetry b/l, hearing intact b/l, palate with symmetric elevation, trapezius OR sternocleidomastiod 5/5 strength b/l, tongue midline on protrusion with full lateral movement    Motor - Normal bulk and tone throughout. No pronator drift.    Strength testing            Deltoid      Biceps      Triceps     Wrist Extension    Wrist Flexion     Interossei         R            5                 5               5                     5                              5                        5                 5  L             5                 5               5                     5                              5                        5                 5              Hip Flexion    Hip Extension    Knee Flexion    Knee Extension    Dorsiflexion    Plantar Flexion  R              5                         5                       5                           5                            5                          5  L              5                         5                        5                           5                            5                          5    Sensation - Light touch/temperature OR pain/vibration intact throughout    DTR's -             Biceps      Triceps     Brachioradialis      Patellar    Ankle    Toes/plantar response  R             2+             2+                  2+                       2+            2+                 Down  L              2+             2+                 2+                        2+           2+                 Down    Coordination - Finger to Nose intact b/l. No tremors appreciated    Gait and station - Normal casual gait. Romberg (-)    Labs:          CAPILLARY BLOOD GLUCOSE      POCT Blood Glucose.: 135 mg/dL (28 Aug 2023 00:43)          CSF:                  Radiology:    
p (1480)     HPI: 82F h/o Afib on Eliquis (last this AM), HTN, CVA (2021,22 resid dysarth) p/f vertigo. NIHSS 1 on arrival. MRI yday w/ acute R ant cerebellar infarct + internal heme on SWI, chronic L cerebell + parieto-temporal infarct. CTH 8/29 w/ hemorrhagic conv R cerebellar stroke + mass effect on 4th vent compared to CTH yday. CTA sig for basilar clot. Coags 8/28 wnl before Eliquis. Na 140.     Exam: AOx2-3, no drift, dysarthria at baseline, BLAKE 5/5, SILT      --Anticoagulation:    =====================  PAST MEDICAL HISTORY   Hypertension    Stroke    Atrial fibrillation      PAST SURGICAL HISTORY     No Known Allergies      MEDICATIONS:  Antibiotics:    Neuro:  acetaminophen     Tablet .. 650 milliGRAM(s) Oral every 6 hours PRN  acetaminophen   IVPB .. 1000 milliGRAM(s) IV Intermittent once    Other:  atorvastatin 80 milliGRAM(s) Oral at bedtime  sodium chloride 2% . 1000 milliLiter(s) IV Continuous <Continuous>      SOCIAL HISTORY:   Occupation:   Marital Status:     FAMILY HISTORY:      ROS: Negative except per HPI    LABS:  PT/INR - ( 29 Aug 2023 17:21 )   PT: 14.9 sec;   INR: 1.37 ratio         PTT - ( 29 Aug 2023 17:21 )  PTT:34.4 sec                        14.6   10.51 )-----------( 164      ( 29 Aug 2023 06:22 )             43.5     08-29    140  |  103  |  16  ----------------------------<  141<H>  3.1<L>   |  24  |  1.10    Ca    8.8      29 Aug 2023 06:22    TPro  7.0  /  Alb  4.1  /  TBili  2.4<H>  /  DBili  x   /  AST  27  /  ALT  25  /  AlkPhos  80  08-28      
CHIEF COMPLAINT:    HISTORY OF PRESENT ILLNESS: 82M with a PMHx of Afib (not on AC for 1 year ), HTN, and CVAs (2021, 2022; residual deficit of dysarthria) who was 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  08/28: Acute infarct in the right anterior cerebellum with associated internal hemorrhagic blood products. CTH: Evolving acute right cerebellar infarct. Eliquis held.   8/29 transferred to the [facility]SC[facility] for worsening neuro exam   Family at bedside who provided all information.   Does not follow with a cardiologist.     PAST MEDICAL & SURGICAL HISTORY:  Hypertension      Stroke      Atrial fibrillation              MEDICATIONS:        acetaminophen     Tablet .. 650 milliGRAM(s) Oral every 6 hours PRN    polyethylene glycol 3350 17 Gram(s) Oral daily  senna 2 Tablet(s) Oral at bedtime    atorvastatin 80 milliGRAM(s) Oral at bedtime    chlorhexidine 4% Liquid 1 Application(s) Topical daily  sodium chloride 3% + sodium acetate 50:50 1000 milliLiter(s) IV Continuous <Continuous>      FAMILY HISTORY:      SOCIAL HISTORY:    [ ] Non-smoker  [ ] Smoker  [ ] Alcohol    Allergies    No Known Allergies    Intolerances    	    REVIEW OF SYSTEMS:  CONSTITUTIONAL: No fever, weight loss, +fatigue  EYES: No eye pain, visual disturbances, or discharge  ENMT:  No difficulty hearing, tinnitus, vertigo; No sinus or throat pain  NECK: No pain or stiffness  RESPIRATORY: No cough, wheezing, chills or hemoptysis; No Shortness of Breath  CARDIOVASCULAR: No chest pain, palpitations, passing out, dizziness, or leg swelling  GASTROINTESTINAL: No abdominal or epigastric pain. No nausea, vomiting, or hematemesis; No diarrhea or constipation. No melena or hematochezia.  GENITOURINARY: No dysuria, frequency, hematuria, or incontinence  NEUROLOGICAL: No headaches, memory loss, loss of strength, numbness, or tremors  SKIN: No itching, burning, rashes, or lesions   LYMPH Nodes: No enlarged glands  ENDOCRINE: No heat or cold intolerance; No hair loss  MUSCULOSKELETAL: No joint pain or swelling; No muscle, back, or extremity pain  PSYCHIATRIC: No depression, anxiety, mood swings, or difficulty sleeping  HEME/LYMPH: No easy bruising, or bleeding gums  ALLERY AND IMMUNOLOGIC: No hives or eczema	    [ ] All others negative	  [ ] Unable to obtain    PHYSICAL EXAM:  T(C): 36.6 (08-30-23 @ 07:00), Max: 37 (08-29-23 @ 23:00)  HR: 69 (08-30-23 @ 08:00) (57 - 81)  BP: 155/92 (08-30-23 @ 08:00) (122/70 - 171/65)  RR: 14 (08-30-23 @ 08:00) (14 - 34)  SpO2: 93% (08-30-23 @ 08:00) (91% - 99%)  Wt(kg): --  I&O's Summary    29 Aug 2023 07:01  -  30 Aug 2023 07:00  --------------------------------------------------------  IN: 1110 mL / OUT: 580 mL / NET: 530 mL        Appearance: NAD  HEENT:   Dry oral mucosa, PERRL, EOMI	  Lymphatic: No lymphadenopathy  Cardiovascular: Irregular  S1 S2, No JVD, No murmurs, No edema  Respiratory: Lungs clear to auscultation	  Psychiatry: A & O x 1 sleepy   Gastrointestinal:  Soft, Non-tender, + BS	  Skin: No rashes, No ecchymoses, No cyanosis	  Neurological: Arousable, PERRL, EOMI, No Gaze Preference, Face Symmetrical,   Motor exam:          Upper extremity                         Delt     Bicep     Tricep    HG                                                 R         5/5        5/5        5/5       5/5                                               L          5/5        5/5        5/5       5/5  Extremities: Normal range of motion, No clubbing, cyanosis or edema  Vascular: Peripheral pulses palpable 2+ bilaterally    TELEMETRY: 	  Afib 70s   ECG:  	Afib non specific stt changes   RADIOLOGY:  < from: CT Head No Cont (08.29.23 @ 17:39) >  ACC: [id number] EXAM:  CT BRAIN   ORDERED BY: LUCREICA DUNCAN     PROCEDURE DATE:  08/29/2023          INTERPRETATION:  Clinical Indication: Follow-up acute right cerebellar   infarct    5mm axial sections of the brain were obtained from base to vertex,   without the intravenous administration of contrast material. Coronal and   sagittal computer generated reconstructed views are available.    Comparison is made with the prior CT of 1:47 PM and the MRI of 8/28/2023.    Ventricular and sulcal prominence is consistent with age-appropriate   involutional change. Lucency in the right cerebellum is consistent with a   recent infarct. An old left temporal occipital infarct is identified. No   obvious hemorrhage is seen. Ventricles and sulci normal for the patient's   age.        IMPRESSION: Age-appropriate involutional and ischemic gliotic changes. No   hemorrhage. Old left temporal occipital infarct. Acute right cerebellar   infarct unchanged since 1:47 PM.    --- End of Report ---      < end of copied text >  < from: CT Head No Cont (08.29.23 @ 14:24) >  ACC: [id number] EXAM:  CT BRAIN   ORDERED BY: JAY CARDENAS     PROCEDURE DATE:  08/29/2023          INTERPRETATION:  INDICATIONS:  cerebellar infarct    TECHNIQUE:  Serial axial images were obtained from the skull base to the   vertex without intravenous contrast. Sagittal and Coronal reformats were   performed    COMPARISON EXAMINATION: MR 8/28/2023 and CT 8/28/2023    FINDINGS:  VENTRICLES AND SULCI: Mass effect on the fourth ventricle subtle but   identifiable which was not seen on the prior 8/28/2023 CT. Ex vacuo   dilatation of the fourth ventricle appreciated.  INTRA-AXIAL:  Evolving right cerebellar infarct with lucency now   identified in the right inferior cerebellar hemisphere not identified on   the prior CT but seen on the prior MR DWI. No associated hemorrhage.   Chronic appearing left lateral temporal parietal territory infarct as   seen on the prior 8/28/2023  EXTRA-AXIAL:  No mass or collection is seen.  VISUALIZED SINUSES:  Clear.  VISUALIZED MASTOIDS:  Clear.  CALVARIUM:  Normal.  MISCELLANEOUS:  None.    IMPRESSION:  Evolving acute right cerebellar infarct which is seen on the   patient's prior MR 8/28/2023 but not seen on initial CT. No associated   hemorrhage. Very subtle mass effect on the fourth ventricle. Chronic left   temporal infarct    --- End of Report ---        < end of copied text >    OTHER: 	  	  LABS:	 	    CARDIAC MARKERS:                                  15.0   13.12 )-----------( 154      ( 29 Aug 2023 22:54 )             44.2     08-30    141  |  105  |  22  ----------------------------<  116<H>  3.8   |  23  |  1.00    Ca    9.0      30 Aug 2023 06:04  Phos  2.9     08-30  Mg     2.0     08-30      proBNP:   Lipid Profile:   HgA1c:   TSH: Thyroid Stimulating Hormone, Serum: 0.47 uIU/mL (08-29 @ 22:54)  < from: TTE W or WO Ultrasound Enhancing Agent (08.28.23 @ 14:44) >  TRANSTHORACIC ECHOCARDIOGRAM REPORT  ________________________________________________________________________________                                      _______       Pt. Name:       CAMMIE JONES        Study Date:    8/28/2023  MRN:            [id number]          YOB: 1940  Accession #:    [id number]           Age:           82 years  Account#:       [id number]        Gender:        M  Heart Rate:     62 bpm              Height:        68.00 in (172.72 cm)  Rhythm:         atrial fibrillation Weight:        165.00 lb (74.84 kg)  Blood Pressure: 166/86 mmHg         BSA/BMI:       1.88 m² / 25.09 kg/m²  ________________________________________________________________________________________  Referring Physician:    [id number] Reji Lopez  Interpreting Physician: Crow López  Primary Sonographer:    Ade Stevenson RDCS    CPT:               ECHO TTE WO CON COMP W DOPP - 52993.m  Indication(s):     Cerebral infarction due to unspecified occlusion or stenosis                     of unspecified cerebral artery - I63.50  Procedure:         Transthoracic echocardiogram with 2-D, M-mode and complete                     spectral and color flow Doppler.  Ordering Location: [facility]  Admission Status:  Inpatient  Study Information: Image quality for this study is fair.    _______________________________________________________________________________________     CONCLUSIONS:      1. Normal left ventricular cavity size. Left ventricular wall thickness is normal. Left ventricular systolic function is normal with an ejection fraction visually estimated at 55 to 60 %. There are no regional wall motion abnormalities seen.   2. Normal right ventricular cavity size, normal right ventricular wall thickness and normal right ventricular systolic function. The tricuspid annular plane systolic excursion (TAPSE) is 1.8 cm (normal >=1.7 cm).   3. The right atrium is mildly dilated in size.   4. No pericardial effusion seen.   5. No prior echocardiogram is available for comparison.    ________________________________________________________________________________________  FINDINGS:     Left Ventricle:  Normal left ventricular cavity size. Left ventricular wall thickness is normal. Left ventricular systolic function is normal with an ejection fraction visually estimated at 55 to 60%. There are no regional wall motion abnormalities seen. Analysis of left ventricular diastolic function and filling pressure is made challenging by the presence of atrial fibrillation.     Right Ventricle:  Normal right ventricular cavity size, normal wall thickness and normal right ventricular systolic function. Tricuspid annular plane systolic excursion (TAPSE) is 1.8 cm (normal >=1.7 cm).     Left Atrium:  The left atrium isseverely dilated in size with an indexed volume of 70.08 ml/m².     Right Atrium:  The right atrium is mildly dilated in size with an indexed volume of 35.04 ml/m² and an indexed area of 12.74 cm²/m².     Aortic Valve:  The aortic valve was not well visualized. The aortic valve is tricuspid with normal structure without stenosis. There is mild calcification of the aortic valve leaflets. There is mild-to-moderate aortic regurgitation.     Mitral Valve:  Structurally normal mitral valve with normalleaflet excursion. There is calcification of the mitral valve annulus. There is mild mitral regurgitation.     Tricuspid Valve:  Structurally normal tricuspid valve with normal leaflet excursion. There is mild tricuspid regurgitation. Estimated pulmonary artery systolic pressure is 38 mmHg, consistent with mild pulmonary hypertension.     Pulmonic Valve:  Structurally normal pulmonic valve with normal leaflet excursion. There is trace pulmonic regurgitation.     Aorta:  The aortic annulus and aortic root appear normal in size. The aortic root at the sinuses of Valsalva diameter is normal.     Pericardium:  No pericardial effusion seen.     Systemic Veins:  The inferior vena cava was not well visualized, PASP is at least 38 mmHg.    < end of copied text >

## 2023-08-31 NOTE — PROGRESS NOTE ADULT - SUBJECTIVE AND OBJECTIVE BOX
HOSPITAL COURSE: 82M with a PMHx of Afib (not on AC for 1 year ), HTN, and CVAs (2021, 2022; residual deficit of dysarthria) who presented with a chief complaint of dizziness. Patient during the admission stated that the room as spinning around him. During admission denied any CP/SOB/N/V.     As per admitting note: LKN: 0000 8/28/23. NIHSS: 1 (dysarthria). mRS: 1.     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  08/28: Acute infarct in the right anterior cerebellum with associated internal hemorrhagic blood products. CTH: Evolving acute right cerebellar infarct. Eliquis held.   8/29 transferred to the NSCU for worsening neuro exam   8/30 Stable CT Head on hypertonics  8/31 Stable, Repeat CTH in AM, monitor Na on 3% @ 75    Allergies    No Known Allergies    Intolerances    REVIEW OF SYSTEMS: [ ] Unable to Assess due to neurologic exam   [ ] All ROS addressed below are non-contributory, except:  Neuro: [ ] Headache [ ] Back pain [ ] Numbness [ ] Weakness [ ] Ataxia [x ] Dizziness [ ] Aphasia [ ] Dysarthria [ ] Visual disturbance  Resp: [ ] Shortness of breath/dyspnea, [ ] Orthopnea [ ] Cough  CV: [ ] Chest pain [ ] Palpitation [ ] Lightheadedness [ ] Syncope  Renal: [ ] Thirst [ ] Edema  GI: [ ] Nausea [ ] Emesis [ ] Abdominal pain [ ] Constipation [ ] Diarrhea  Hem: [ ] Hematemesis [ ] bright red blood per rectum  ID: [ ] Fever [ ] Chills [ ] Dysuria  ENT: [ ] Rhinorrhea      DEVICES:   [ ] Restraints [ ] ET tube [ ] central line [ ] arterial line [ ] pretty [ ] NGT/OGT [ ] EVD [ ] LD [ ] NIMESH/HMV [ ] Trach [ ] PEG [ ] Chest Tube     ICU Vital Signs Last 24 Hrs  T(C): 36.9 (31 Aug 2023 07:00), Max: 37.6 (31 Aug 2023 03:00)  T(F): 98.4 (31 Aug 2023 07:00), Max: 99.7 (31 Aug 2023 03:00)  HR: 69 (31 Aug 2023 07:00) (60 - 80)  BP: 167/73 (31 Aug 2023 07:00) (143/52 - 167/73)  BP(mean): 102 (31 Aug 2023 07:00) (79 - 110)  ABP: --  ABP(mean): --  RR: 30 (31 Aug 2023 07:00) (13 - 31)  SpO2: 96% (31 Aug 2023 07:00) (90% - 100%)    O2 Parameters below as of 31 Aug 2023 07:00  Patient On (Oxygen Delivery Method): nasal cannula  O2 Flow (L/min): 2    I&O's Detail    30 Aug 2023 07:01  -  31 Aug 2023 07:00  --------------------------------------------------------  IN:    IV PiggyBack: 299.9 mL    sodium chloride 3% + sodium acetate 50:50: 275 mL    sodium chloride 3% + sodium acetate 50:50: 1150 mL  Total IN: 1724.9 mL    OUT:    Voided (mL): 750 mL  Total OUT: 750 mL    Total NET: 974.9 mL      MEDICATIONS  (STANDING):  aspirin Suppository 300 milliGRAM(s) Rectal daily  atorvastatin 80 milliGRAM(s) Oral at bedtime  chlorhexidine 4% Liquid 1 Application(s) Topical daily  enoxaparin Injectable 40 milliGRAM(s) SubCutaneous <User Schedule>  hydrALAZINE Injectable 5 milliGRAM(s) IV Push once  polyethylene glycol 3350 17 Gram(s) Oral daily  senna 2 Tablet(s) Oral at bedtime  sodium chloride 3% + sodium acetate 50:50 1000 milliLiter(s) (25 mL/Hr) IV Continuous <Continuous>  sodium chloride 3% + sodium acetate 50:50 1000 milliLiter(s) (50 mL/Hr) IV Continuous <Continuous>    MEDICATIONS  (PRN):  acetaminophen     Tablet .. 650 milliGRAM(s) Oral every 6 hours PRN Temp greater or equal to 38C (100.4F), Mild Pain (1 - 3), Moderate Pain (4 - 6), Severe Pain (7 - 10)        LABS:                          13.3   10.55 )-----------( 164      ( 31 Aug 2023 00:39 )             40.4   08-31    148<H>  |  107  |  22  ----------------------------<  128<H>  4.2   |  25  |  0.93    Ca    8.6      31 Aug 2023 06:04  Phos  2.3     08-31  Mg     1.8     08-31                                  IMAGING:  < from: CT Head No Cont (08.30.23 @ 09:12) >  IMPRESSION:    No significant interval change from 8/29/2023.    Similar-appearing acute right lateral cerebellar infarct with similar   hemorrhagic transformation.    < end of copied text >      < from: [facility] Duplex Lower Ext Vein Scan, Bilat (08.30.23 @ 12:29) >  IMPRESSION:  No evidence of deep venous thrombosis in either lower extremity.    < end of copied text >      EXAMINATION:  PHYSICAL EXAM:    Constitutional: No Acute Distress     Neurological: Arousable, PERRL, EOMI, No Gaze Preference, Face Symmetrical, Mandarin Speaking  Motor exam:          Upper extremity                         Delt     Bicep     Tricep    HG                                                 R         5/5        5/5        5/5       5/5                                               L          5/5        5/5        5/5       5/5          Pulmonary: Clear to Auscultation, No rales, No rhonchi, No wheezes   Cardiovascular: S1, S2, Regular rate and rhythm   Gastrointestinal: Soft, Non-tender, Non-distended                  HOSPITAL COURSE: 82M with a PMHx of Afib (not on AC for 1 year ), HTN, and CVAs (2021, 2022; residual deficit of dysarthria) who presented with a chief complaint of dizziness. Patient during the admission stated that the room as spinning around him. During admission denied any CP/SOB/N/V.     As per admitting note: LKN: 0000 8/28/23. NIHSS: 1 (dysarthria). mRS: 1.     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  08/28: Acute infarct in the right anterior cerebellum with associated internal hemorrhagic blood products. CTH: Evolving acute right cerebellar infarct. Eliquis held.   8/29 transferred to the NSCU for worsening neuro exam   8/30 Stable CT Head on hypertonics  8/31 Stable, Repeat CTH in AM, monitor Na on 3% @ 75    Allergies    No Known Allergies    Intolerances    REVIEW OF SYSTEMS: [ ] Unable to Assess due to neurologic exam   [ ] All ROS addressed below are non-contributory, except:  Neuro: [ ] Headache [ ] Back pain [ ] Numbness [ ] Weakness [ ] Ataxia [x ] Dizziness [ ] Aphasia [ ] Dysarthria [ ] Visual disturbance  Resp: [ ] Shortness of breath/dyspnea, [ ] Orthopnea [ ] Cough  CV: [ ] Chest pain [ ] Palpitation [ ] Lightheadedness [ ] Syncope  Renal: [ ] Thirst [ ] Edema  GI: [ ] Nausea [ ] Emesis [ ] Abdominal pain [ ] Constipation [ ] Diarrhea  Hem: [ ] Hematemesis [ ] bright red blood per rectum  ID: [ ] Fever [ ] Chills [ ] Dysuria  ENT: [ ] Rhinorrhea      DEVICES:   [ ] Restraints [ ] ET tube [ ] central line [ ] arterial line [ ] pretty [ ] NGT/OGT [ ] EVD [ ] LD [ ] NIMESH/HMV [ ] Trach [ ] PEG [ ] Chest Tube       T(C): 36.9 (08-31-23 @ 07:00), Max: 37.6 (08-31-23 @ 03:00)  HR: 68 (08-31-23 @ 08:00) (60 - 80)  BP: 162/93 (08-31-23 @ 08:00) (143/52 - 167/73)  RR: 26 (08-31-23 @ 08:00) (13 - 31)  SpO2: 95% (08-31-23 @ 08:00) (90% - 100%)  08-30-23 @ 07:01  -  08-31-23 @ 07:00  --------------------------------------------------------  IN: 1799.9 mL / OUT: 750 mL / NET: 1049.9 mL    08-31-23 @ 07:01  -  08-31-23 @ 08:29  --------------------------------------------------------  IN: 150 mL / OUT: 0 mL / NET: 150 mL    acetaminophen     Tablet .. 650 milliGRAM(s) Oral every 6 hours PRN  aspirin Suppository 300 milliGRAM(s) Rectal daily  atorvastatin 80 milliGRAM(s) Oral at bedtime  chlorhexidine 4% Liquid 1 Application(s) Topical daily  enoxaparin Injectable 40 milliGRAM(s) SubCutaneous <User Schedule>  polyethylene glycol 3350 17 Gram(s) Oral daily  senna 2 Tablet(s) Oral at bedtime  sodium chloride 3% + sodium acetate 50:50 1000 milliLiter(s) IV Continuous <Continuous>  sodium chloride 3% + sodium acetate 50:50 1000 milliLiter(s) IV Continuous <Continuous>        LABS:                          13.3   10.55 )-----------( 164      ( 31 Aug 2023 00:39 )             40.4   08-31    148<H>  |  107  |  22  ----------------------------<  128<H>  4.2   |  25  |  0.93    Ca    8.6      31 Aug 2023 06:04  Phos  2.3     08-31  Mg     1.8     08-31                                  IMAGING:  < from: CT Head No Cont (08.30.23 @ 09:12) >  IMPRESSION:    No significant interval change from 8/29/2023.    Similar-appearing acute right lateral cerebellar infarct with similar   hemorrhagic transformation.    < end of copied text >      < from: [facility] Duplex Lower Ext Vein Scan, Bilat (08.30.23 @ 12:29) >  IMPRESSION:  No evidence of deep venous thrombosis in either lower extremity.    < end of copied text >      EXAMINATION:  PHYSICAL EXAM:    Constitutional: No Acute Distress     Neurological: Arousable, PERRL, EOMI, No Gaze Preference, Face Symmetrical, Mandarin Speaking  Motor exam:          Upper extremity                         Delt     Bicep     Tricep    HG                                                 R         5/5 5/5 5/5 5/5                                               L          5/5 5/5 5/5 5/5          Pulmonary: Clear to Auscultation, No rales, No rhonchi, No wheezes   Cardiovascular: S1, S2, Regular rate and rhythm   Gastrointestinal: Soft, Non-tender, Non-distended         LABS:  Na: 148 (08-31 @ 06:04), 144 (08-31 @ 00:39), 140 (08-30 @ 13:48), 141 (08-30 @ 06:04), 140 (08-29 @ 22:54), 140 (08-29 @ 06:22)  K: 4.2 (08-31 @ 06:04), 4.0 (08-31 @ 00:39), 6.0 (08-30 @ 13:48), 3.8 (08-30 @ 06:04), 3.5 (08-29 @ 22:54), 3.1 (08-29 @ 06:22)  Cl: 107 (08-31 @ 06:04), 106 (08-31 @ 00:39), 105 (08-30 @ 13:48), 105 (08-30 @ 06:04), 105 (08-29 @ 22:54), 103 (08-29 @ 06:22)  CO2: 25 (08-31 @ 06:04), 25 (08-31 @ 00:39), 25 (08-30 @ 13:48), 23 (08-30 @ 06:04), 24 (08-29 @ 22:54), 24 (08-29 @ 06:22)  BUN: 22 (08-31 @ 06:04), 22 (08-31 @ 00:39), 22 (08-30 @ 13:48), 22 (08-30 @ 06:04), 21 (08-29 @ 22:54), 16 (08-29 @ 06:22)  Cr: 0.93 (08-31 @ 06:04), 0.91 (08-31 @ 00:39), 0.86 (08-30 @ 13:48), 1.00 (08-30 @ 06:04), 1.07 (08-29 @ 22:54), 1.10 (08-29 @ 06:22)  Glu: 128(08-31 @ 06:04), 122(08-31 @ 00:39), 133(08-30 @ 13:48), 116(08-30 @ 06:04), 122(08-29 @ 22:54), 141(08-29 @ 06:22)    Hgb: 13.3 (08-31 @ 00:39), 15.0 (08-29 @ 22:54), 14.6 (08-29 @ 06:22)  Hct: 40.4 (08-31 @ 00:39), 44.2 (08-29 @ 22:54), 43.5 (08-29 @ 06:22)  WBC: 10.55 (08-31 @ 00:39), 13.12 (08-29 @ 22:54), 10.51 (08-29 @ 06:22)  Plt: 164 (08-31 @ 00:39), 154 (08-29 @ 22:54), 164 (08-29 @ 06:22)    INR: 1.33 08-30-23 @ 06:04, 1.37 08-29-23 @ 17:21  PTT: 32.3 08-30-23 @ 06:04, 34.4 08-29-23 @ 17:21                   HOSPITAL COURSE: 82M with a PMHx of Afib (not on AC for 1 year ), HTN, and CVAs (2021, 2022; residual deficit of dysarthria) who presented with a chief complaint of dizziness. Patient during the admission stated that the room as spinning around him. During admission denied any CP/SOB/N/V.     As per admitting note: LKN: 0000 8/28/23. NIHSS: 1 (dysarthria). mRS: 1.     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  08/28: Acute infarct in the right anterior cerebellum with associated internal hemorrhagic blood products. CTH: Evolving acute right cerebellar infarct. Eliquis held.   8/29 transferred to the NSCU for worsening neuro exam   8/30 Stable CT Head on hypertonics  8/31 Stable, Repeat CTH stable, monitor Na on 3% @ 75    Allergies    No Known Allergies    Intolerances    REVIEW OF SYSTEMS: [ ] Unable to Assess due to neurologic exam   [ ] All ROS addressed below are non-contributory, except:  Neuro: [ ] Headache [ ] Back pain [ ] Numbness [ ] Weakness [ ] Ataxia [x ] Dizziness [ ] Aphasia [ ] Dysarthria [ ] Visual disturbance  Resp: [ ] Shortness of breath/dyspnea, [ ] Orthopnea [ ] Cough  CV: [ ] Chest pain [ ] Palpitation [ ] Lightheadedness [ ] Syncope  Renal: [ ] Thirst [ ] Edema  GI: [ ] Nausea [ ] Emesis [ ] Abdominal pain [ ] Constipation [ ] Diarrhea  Hem: [ ] Hematemesis [ ] bright red blood per rectum  ID: [ ] Fever [ ] Chills [ ] Dysuria  ENT: [ ] Rhinorrhea      DEVICES:   [ ] Restraints [ ] ET tube [ ] central line [ ] arterial line [ ] pretty [ ] NGT/OGT [ ] EVD [ ] LD [ ] NIMESH/HMV [ ] Trach [ ] PEG [ ] Chest Tube       T(C): 36.9 (08-31-23 @ 07:00), Max: 37.6 (08-31-23 @ 03:00)  HR: 68 (08-31-23 @ 08:00) (60 - 80)  BP: 162/93 (08-31-23 @ 08:00) (143/52 - 167/73)  RR: 26 (08-31-23 @ 08:00) (13 - 31)  SpO2: 95% (08-31-23 @ 08:00) (90% - 100%)  08-30-23 @ 07:01  -  08-31-23 @ 07:00  --------------------------------------------------------  IN: 1799.9 mL / OUT: 750 mL / NET: 1049.9 mL    08-31-23 @ 07:01  -  08-31-23 @ 08:29  --------------------------------------------------------  IN: 150 mL / OUT: 0 mL / NET: 150 mL    acetaminophen     Tablet .. 650 milliGRAM(s) Oral every 6 hours PRN  aspirin Suppository 300 milliGRAM(s) Rectal daily  atorvastatin 80 milliGRAM(s) Oral at bedtime  chlorhexidine 4% Liquid 1 Application(s) Topical daily  enoxaparin Injectable 40 milliGRAM(s) SubCutaneous <User Schedule>  polyethylene glycol 3350 17 Gram(s) Oral daily  senna 2 Tablet(s) Oral at bedtime  sodium chloride 3% + sodium acetate 50:50 1000 milliLiter(s) IV Continuous <Continuous>  sodium chloride 3% + sodium acetate 50:50 1000 milliLiter(s) IV Continuous <Continuous>        LABS:                          13.3   10.55 )-----------( 164      ( 31 Aug 2023 00:39 )             40.4   08-31    148<H>  |  107  |  22  ----------------------------<  128<H>  4.2   |  25  |  0.93    Ca    8.6      31 Aug 2023 06:04  Phos  2.3     08-31  Mg     1.8     08-31                                  IMAGING:  < from: CT Head No Cont (08.30.23 @ 09:12) >  IMPRESSION:    No significant interval change from 8/29/2023.    Similar-appearing acute right lateral cerebellar infarct with similar   hemorrhagic transformation.    < end of copied text >      < from: [facility] Duplex Lower Ext Vein Scan, Bilat (08.30.23 @ 12:29) >  IMPRESSION:  No evidence of deep venous thrombosis in either lower extremity.    < end of copied text >      EXAMINATION:  PHYSICAL EXAM:    Constitutional: No Acute Distress     Neurological: Arousable, PERRL, EOMI, No Gaze Preference, Face Symmetrical, Mandarin Speaking  Motor exam:          Upper extremity                         Delt     Bicep     Tricep    HG                                                 R         5/5 5/5 5/5 5/5                                               L          5/5 5/5 5/5 5/5          Pulmonary: Clear to Auscultation, No rales, No rhonchi, No wheezes   Cardiovascular: S1, S2, Regular rate and rhythm   Gastrointestinal: Soft, Non-tender, Non-distended         LABS:  Na: 148 (08-31 @ 06:04), 144 (08-31 @ 00:39), 140 (08-30 @ 13:48), 141 (08-30 @ 06:04), 140 (08-29 @ 22:54), 140 (08-29 @ 06:22)  K: 4.2 (08-31 @ 06:04), 4.0 (08-31 @ 00:39), 6.0 (08-30 @ 13:48), 3.8 (08-30 @ 06:04), 3.5 (08-29 @ 22:54), 3.1 (08-29 @ 06:22)  Cl: 107 (08-31 @ 06:04), 106 (08-31 @ 00:39), 105 (08-30 @ 13:48), 105 (08-30 @ 06:04), 105 (08-29 @ 22:54), 103 (08-29 @ 06:22)  CO2: 25 (08-31 @ 06:04), 25 (08-31 @ 00:39), 25 (08-30 @ 13:48), 23 (08-30 @ 06:04), 24 (08-29 @ 22:54), 24 (08-29 @ 06:22)  BUN: 22 (08-31 @ 06:04), 22 (08-31 @ 00:39), 22 (08-30 @ 13:48), 22 (08-30 @ 06:04), 21 (08-29 @ 22:54), 16 (08-29 @ 06:22)  Cr: 0.93 (08-31 @ 06:04), 0.91 (08-31 @ 00:39), 0.86 (08-30 @ 13:48), 1.00 (08-30 @ 06:04), 1.07 (08-29 @ 22:54), 1.10 (08-29 @ 06:22)  Glu: 128(08-31 @ 06:04), 122(08-31 @ 00:39), 133(08-30 @ 13:48), 116(08-30 @ 06:04), 122(08-29 @ 22:54), 141(08-29 @ 06:22)    Hgb: 13.3 (08-31 @ 00:39), 15.0 (08-29 @ 22:54), 14.6 (08-29 @ 06:22)  Hct: 40.4 (08-31 @ 00:39), 44.2 (08-29 @ 22:54), 43.5 (08-29 @ 06:22)  WBC: 10.55 (08-31 @ 00:39), 13.12 (08-29 @ 22:54), 10.51 (08-29 @ 06:22)  Plt: 164 (08-31 @ 00:39), 154 (08-29 @ 22:54), 164 (08-29 @ 06:22)    INR: 1.33 08-30-23 @ 06:04, 1.37 08-29-23 @ 17:21  PTT: 32.3 08-30-23 @ 06:04, 34.4 08-29-23 @ 17:21

## 2023-08-31 NOTE — PROGRESS NOTE ADULT - ASSESSMENT
*INCOMPLETE*  2M R-handed PMHx Afib (not on AC), HTN, and strokes (2021, 2022; residual deficit of dysarthria) presenting with sudden onset "dizziness." No prior h/o similar symptoms. Vitals notable for systolic >180s. CBC and CMP WNL. CTH showing chronic left parietotemporal infarct but no acute findings. CTA neck w/o flow-limiting stenosis but CTA head demonstrating high-grade narrowing of proximal basilar artery with patency of distal basilar artery and its branches. Pt not a candidate for tenecteplase due to non-disabling symptoms and not a thrombectomy candidate due to no LVO seen on CTA H/N. Course c/b hemorrhagic transformation of R cerebellar infarct with mass effect, after which pt transferred to NSCU for monitoring.    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 Mechanism of stroke potentially cardioembolic due to Afib.     NEURO: Yesterday pt's mental status had decreased, and given R cerebellar infarct with mass effect and hemorrhagic conversion, Eliquis held and pt transfered to NSCU for closer monitoring. Physical therapy/OT/Speech eval/treatment.     ANTITHROMBOTIC THERAPY: Eliquis 5 BID held. For now, recommend aspirin 81qd and Lovenox 40 qd (DVT ppx, not therapeutic level anticoagulation)    PULMONARY: CXR clear, protecting airway, saturating well     CARDIOVASCULAR: TTE EF 55-60%, continue cardiac monitoring                              SBP goal: < 160    GASTROINTESTINAL:  dysphagia screen passed     Diet: Regular    RENAL: BUN/Cr stable, good urine output. Started 2% @ 30 for Na goal 145-150.      Na Goal: 145-150     Ying: No    HEMATOLOGY: H/H stable, Platelets 154     DVT ppx: Lovenox    ID: Afebrile, mild leukocytosis     OTHER: Plan discussed with patient and family at beside.     DISPOSITION: Rehab once stable and workup is complete *INCOMPLETE*  2M R-handed PMHx Afib (not on AC), HTN, and strokes (2021, 2022; residual deficit of dysarthria) presenting with sudden onset "dizziness." No prior h/o similar symptoms. Vitals notable for systolic >180s. CBC and CMP WNL. CTH showing chronic left parietotemporal infarct but no acute findings. CTA neck w/o flow-limiting stenosis but CTA head demonstrating high-grade narrowing of proximal basilar artery with patency of distal basilar artery and its branches. Pt not a candidate for tenecteplase due to non-disabling symptoms and not a thrombectomy candidate due to no LVO seen on CTA H/N. Course c/b hemorrhagic transformation of R cerebellar infarct with mass effect, after which pt transferred to NSCU for monitoring.    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 Mechanism of stroke potentially cardioembolic due to Afib.     NEURO: Two days ago pt's mental status had decreased, and given R cerebellar infarct with mass effect and hemorrhagic conversion, Eliquis held and pt transfered to NSCU for closer monitoring. Physical therapy/OT/Speech eval/treatment. Recommend repeat MRI w/o contrast to evaluate potential evolution of underlying ischemic stroke and hemorrhagic transformation    ANTITHROMBOTIC THERAPY: Eliquis 5 BID held. For now, recommend aspirin 81qd and Lovenox 40 qd (DVT ppx, not therapeutic level anticoagulation)    PULMONARY: CXR clear, protecting airway, saturating well     CARDIOVASCULAR: TTE EF 55-60%, continue cardiac monitoring                              SBP goal: < 140    GASTROINTESTINAL:  dysphagia screen passed     Diet: Regular    RENAL: BUN/Cr stable, good urine output. Started 2% @ 30 for Na goal 145-150.      Na Goal: 145-150     Ying: No    HEMATOLOGY: H/H stable, Platelets 154     DVT ppx: Lovenox    ID: Afebrile, mild leukocytosis     OTHER: Plan discussed with patient and family at beside.     DISPOSITION: Rehab once stable and workup is complete *INCOMPLETE*  2M R-handed PMHx Afib (not on AC), HTN, and strokes (2021, 2022; residual deficit of dysarthria) presenting with sudden onset "dizziness." No prior h/o similar symptoms. Vitals notable for systolic >180s. CBC and CMP WNL. CTH showing chronic left parietotemporal infarct but no acute findings. CTA neck w/o flow-limiting stenosis but CTA head demonstrating high-grade narrowing of proximal basilar artery with patency of distal basilar artery and its branches. Pt not a candidate for tenecteplase due to non-disabling symptoms and not a thrombectomy candidate due to no LVO seen on CTA H/N. Course c/b hemorrhagic transformation of R cerebellar infarct with mass effect, after which pt transferred to NSCU for monitoring.    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 Mechanism of stroke potentially cardioembolic due to Afib.     NEURO: Two days ago pt's mental status had decreased, and given R cerebellar infarct with mass effect and hemorrhagic conversion, Eliquis held and pt transfered to NSCU for closer monitoring. Physical therapy/OT/Speech eval/treatment. Recommend repeat MRI w/o contrast to evaluate potential evolution of underlying ischemic stroke and hemorrhagic transformation. Hyperosmolar therapy as per NSCU team.     ANTITHROMBOTIC THERAPY: Eliquis 5 BID held. For now, recommending aspirin 81qd and Lovenox 40 qd (DVT ppx, not therapeutic level anticoagulation)    PULMONARY: CXR clear, protecting airway, saturating well     CARDIOVASCULAR: TTE EF 55-60%, continue cardiac monitoring                              SBP goal: < 140    GASTROINTESTINAL: Swallow eval recommending pureed     Diet: Pureed    RENAL: BUN/Cr stable, good urine output. Started 2% @ 30 for Na goal 145-150.      Na Goal: 145-150     Ying: No    HEMATOLOGY: H/H stable, Platelets 164     DVT ppx: Lovenox    ID: Afebrile, mild leukocytosis     OTHER: Plan discussed with patient and family at beside.     DISPOSITION: Rehab once stable and workup is complete *INCOMPLETE*  2M R-handed PMHx Afib (not on AC), HTN, and strokes (2021, 2022; residual deficit of dysarthria) presenting with sudden onset "dizziness." No prior h/o similar symptoms. Vitals notable for systolic >180s. CBC and CMP WNL. CTH showing chronic left parietotemporal infarct but no acute findings. CTA neck w/o flow-limiting stenosis but CTA head demonstrating high-grade narrowing of proximal basilar artery with patency of distal basilar artery and its branches. Pt not a candidate for tenecteplase due to non-disabling symptoms and not a thrombectomy candidate due to no LVO seen on CTA H/N. Course c/b hemorrhagic transformation of R cerebellar infarct with mass effect, after which pt transferred to NSCU for monitoring.    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 Mechanism of stroke potentially cardioembolic due to Afib.     NEURO: Two days ago pt's mental status had decreased, and given R cerebellar infarct with mass effect and hemorrhagic conversion, Eliquis held and pt transfered to NSCU for closer monitoring. Physical therapy/OT/Speech eval/treatment. Recommend repeat MRI w/o contrast to evaluate potential evolution of underlying ischemic stroke and hemorrhagic transformation. Hyperosmolar therapy as per NSCU team.     ANTITHROMBOTIC THERAPY: Anticoagulation  held. For now, recommending aspirin 81qd and Lovenox 40 qd (DVT ppx, not therapeutic level anticoagulation)    PULMONARY: CXR clear, protecting airway, saturating well     CARDIOVASCULAR: TTE EF 55-60%, continue cardiac monitoring                              SBP goal: < 140    GASTROINTESTINAL: Swallow eval recommending pureed     Diet: Pureed    RENAL: BUN/Cr stable, good urine output. Started 2% @ 30 for Na goal 145-150.      Na Goal: 145-150     Ying: No    HEMATOLOGY: H/H stable, Platelets 164     DVT ppx: Lovenox    ID: Afebrile, mild leukocytosis     OTHER: Plan discussed with patient and family at beside.     DISPOSITION: Rehab once stable and workup is complete

## 2023-08-31 NOTE — DIETITIAN INITIAL EVALUATION ADULT - FACTORS AFF FOOD INTAKE
Pt NPO at this time (day 2). S/P swallow evaluation 8/28: "Easy to chew with thin liquids" recommended. Now pending repeat swallow evaluation s/p worsening exam and transferring to NSCU.

## 2023-08-31 NOTE — PROGRESS NOTE ADULT - SUBJECTIVE AND OBJECTIVE BOX
Patient seen and examined at bedside.    --Anticoagulation--  enoxaparin Injectable 40 milliGRAM(s) SubCutaneous <User Schedule>    T(C): 37.5 (08-30-23 @ 23:00), Max: 37.5 (08-30-23 @ 23:00)  HR: 66 (08-31-23 @ 01:00) (60 - 81)  BP: 158/90 (08-31-23 @ 01:00) (138/76 - 160/83)  RR: 28 (08-31-23 @ 01:00) (13 - 34)  SpO2: 94% (08-31-23 @ 01:00) (90% - 98%)  Wt(kg): --    Exam: Arousable, Ox3 no aphasia, No facial asymmetry, no nystagmus, mild dysarthria, tongue midline, no drift, normal fine finger, no dysmetria, BLAKE 5/5

## 2023-08-31 NOTE — DIETITIAN INITIAL EVALUATION ADULT - PERTINENT MEDS FT
MEDICATIONS  (STANDING):  aspirin Suppository 300 milliGRAM(s) Rectal daily  atorvastatin 80 milliGRAM(s) Oral at bedtime  chlorhexidine 4% Liquid 1 Application(s) Topical daily  enoxaparin Injectable 40 milliGRAM(s) SubCutaneous <User Schedule>  polyethylene glycol 3350 17 Gram(s) Oral daily  senna 2 Tablet(s) Oral at bedtime  sodium chloride 3% + sodium acetate 50:50 1000 milliLiter(s) (25 mL/Hr) IV Continuous <Continuous>  sodium chloride 3% + sodium acetate 50:50 1000 milliLiter(s) (50 mL/Hr) IV Continuous <Continuous>    MEDICATIONS  (PRN):  acetaminophen     Tablet .. 650 milliGRAM(s) Oral every 6 hours PRN Temp greater or equal to 38C (100.4F), Mild Pain (1 - 3), Moderate Pain (4 - 6), Severe Pain (7 - 10)

## 2023-08-31 NOTE — SWALLOW BEDSIDE ASSESSMENT ADULT - SPECIFY REASON(S)
to assess the swallow mechanism; r/o dysphagia.
to subjectively reassess the swallow mechanism and determine safest oral diet consistencies, s/p change in status from initial swallow evaluation 8/28 [facility]

## 2023-08-31 NOTE — PROGRESS NOTE ADULT - SUBJECTIVE AND OBJECTIVE BOX
Neurology Progress Note    SUBJECTIVE/OBJECTIVE/INTERVAL EVENTS: Patient seen and examined at bedside w/ neuro attending .     MEDICATIONS  (STANDING):  aspirin Suppository 300 milliGRAM(s) Rectal daily  atorvastatin 80 milliGRAM(s) Oral at bedtime  chlorhexidine 4% Liquid 1 Application(s) Topical daily  enoxaparin Injectable 40 milliGRAM(s) SubCutaneous <User Schedule>  polyethylene glycol 3350 17 Gram(s) Oral daily  senna 2 Tablet(s) Oral at bedtime  sodium chloride 3% + sodium acetate 50:50 1000 milliLiter(s) (50 mL/Hr) IV Continuous <Continuous>  sodium chloride 3% + sodium acetate 50:50 1000 milliLiter(s) (25 mL/Hr) IV Continuous <Continuous>    MEDICATIONS  (PRN):  acetaminophen     Tablet .. 650 milliGRAM(s) Oral every 6 hours PRN Temp greater or equal to 38C (100.4F), Mild Pain (1 - 3), Moderate Pain (4 - 6), Severe Pain (7 - 10)      VITALS & EXAMINATION:  Vital Signs Last 24 Hrs  T(C): 36.9 (31 Aug 2023 07:00), Max: 37.6 (31 Aug 2023 03:00)  T(F): 98.4 (31 Aug 2023 07:00), Max: 99.7 (31 Aug 2023 03:00)  HR: 68 (31 Aug 2023 08:00) (60 - 80)  BP: 162/93 (31 Aug 2023 08:00) (143/52 - 167/73)  BP(mean): 114 (31 Aug 2023 08:00) (79 - 114)  RR: 26 (31 Aug 2023 08:00) (13 - 31)  SpO2: 95% (31 Aug 2023 08:00) (90% - 100%)    Parameters below as of 31 Aug 2023 07:00  Patient On (Oxygen Delivery Method): nasal cannula  O2 Flow (L/min): 2      General:  Constitutional: Male, appears stated age, nontoxic, not in distress,    Head: Normocephalic;   Eyes: clear sclera;   Extremities: No cyanosis;   Resp: breathing comfortably     Neurological (>12):  MS: Awake, alert.  Oriented person place situation. Follows commands. Attends to examiner  Language: Speech is hypophonic, clear, fluent, good repetition,  comprehension, registration of words.  CNs: PERRL (R 3mm, L 3mm). VFF. EOMI. No disconjugate gaze, skew. V1-3 intact LT, No facial asymmetry b/l. Hearing grossly normal b/l. Tongue midline.     Motor - Normal bulk and tone throughout. No pronator drift.    L/R (out of 5)       Deltoid  5/5    Biceps   5/5      Triceps  5/5         Wrist Extension 5/5   Wrist Flexion  5/5   Interossei 5/5     5/5   L/R (out of 5)       Hip Flexion  5/5    Hip Extension  5/5  Knee Extension  5/5  Dorsiflexion  5/5      Plantar Flexion 5/5     Sensation: Intact to LT b/l. Cortical: Extinction on DSS (neglect): none  Reflexes L/R:  Biceps(C5) 2/2  BR(C6) 2/2   Triceps(C7)  2/2 Patellar(L4)   2/2   Toes: mute  Coordination: No dysmetria to FTN b/l UE  Gait: Normal Romberg. No postural instability. Normal stance.    LABORATORY:  CBC                       13.3   10.55 )-----------( 164      ( 31 Aug 2023 00:39 )             40.4     Chem 08-31    148<H>  |  107  |  22  ----------------------------<  128<H>  4.2   |  25  |  0.93    Ca    8.6      31 Aug 2023 06:04  Phos  2.3     08-31  Mg     1.8     08-31      LFTs   Coagulopathy PT/INR - ( 30 Aug 2023 06:04 )   PT: 14.5 sec;   INR: 1.33 ratio         PTT - ( 30 Aug 2023 06:04 )  PTT:32.3 sec  Lipid Panel 08-29 Chol 115 LDL -- HDL 51 Trig 59  A1c   Cardiac enzymes     U/A Urinalysis Basic - ( 31 Aug 2023 06:04 )    Color: x / Appearance: x / SG: x / pH: x  Gluc: 128 mg/dL / Ketone: x  / Bili: x / Urobili: x   Blood: x / Protein: x / Nitrite: x   Leuk Esterase: x / RBC: x / WBC x   Sq Epi: x / Non Sq Epi: x / Bacteria: x      CSF  Immunological  Other    STUDIES & IMAGING: (EEG, CT, MR, U/S, TTE/TIA): Neurology Progress Note    SUBJECTIVE/OBJECTIVE/INTERVAL EVENTS: Patient seen and examined at bedside w/ neuro attending .     MEDICATIONS  (STANDING):  aspirin Suppository 300 milliGRAM(s) Rectal daily  atorvastatin 80 milliGRAM(s) Oral at bedtime  chlorhexidine 4% Liquid 1 Application(s) Topical daily  enoxaparin Injectable 40 milliGRAM(s) SubCutaneous <User Schedule>  polyethylene glycol 3350 17 Gram(s) Oral daily  senna 2 Tablet(s) Oral at bedtime  sodium chloride 3% + sodium acetate 50:50 1000 milliLiter(s) (50 mL/Hr) IV Continuous <Continuous>  sodium chloride 3% + sodium acetate 50:50 1000 milliLiter(s) (25 mL/Hr) IV Continuous <Continuous>    MEDICATIONS  (PRN):  acetaminophen     Tablet .. 650 milliGRAM(s) Oral every 6 hours PRN Temp greater or equal to 38C (100.4F), Mild Pain (1 - 3), Moderate Pain (4 - 6), Severe Pain (7 - 10)      VITALS & EXAMINATION:  Vital Signs Last 24 Hrs  T(C): 36.9 (31 Aug 2023 07:00), Max: 37.6 (31 Aug 2023 03:00)  T(F): 98.4 (31 Aug 2023 07:00), Max: 99.7 (31 Aug 2023 03:00)  HR: 68 (31 Aug 2023 08:00) (60 - 80)  BP: 162/93 (31 Aug 2023 08:00) (143/52 - 167/73)  BP(mean): 114 (31 Aug 2023 08:00) (79 - 114)  RR: 26 (31 Aug 2023 08:00) (13 - 31)  SpO2: 95% (31 Aug 2023 08:00) (90% - 100%)    Parameters below as of 31 Aug 2023 07:00  Patient On (Oxygen Delivery Method): nasal cannula  O2 Flow (L/min): 2      General:  Constitutional: Male, appears stated age, nontoxic, not in distress,    Head: Normocephalic;   Eyes: clear sclera;   Extremities: No cyanosis;   Resp: breathing comfortably     NEUROLOGICAL EXAM:  Mental status: Alert, Oriented to self, hospital, not year or age  Cranial Nerves: PERRL, intact BTT b/l, tracks examiner with eyes, No facial asymmetry, mild dysarthria  Motor exam: Normal tone, no drift in b/l UE and LE   Sensation: Intact to light touch   Coordination: Can touch examiner's fingers b/l  Gait: Deferred     LABORATORY:  CBC                       13.3   10.55 )-----------( 164      ( 31 Aug 2023 00:39 )             40.4     Chem 08-31    148<H>  |  107  |  22  ----------------------------<  128<H>  4.2   |  25  |  0.93    Ca    8.6      31 Aug 2023 06:04  Phos  2.3     08-31  Mg     1.8     08-31      LFTs   Coagulopathy PT/INR - ( 30 Aug 2023 06:04 )   PT: 14.5 sec;   INR: 1.33 ratio         PTT - ( 30 Aug 2023 06:04 )  PTT:32.3 sec  Lipid Panel 08-29 Chol 115 LDL -- HDL 51 Trig 59  A1c   Cardiac enzymes     U/A Urinalysis Basic - ( 31 Aug 2023 06:04 )    Color: x / Appearance: x / SG: x / pH: x  Gluc: 128 mg/dL / Ketone: x  / Bili: x / Urobili: x   Blood: x / Protein: x / Nitrite: x   Leuk Esterase: x / RBC: x / WBC x   Sq Epi: x / Non Sq Epi: x / Bacteria: x      CSF  Immunological  Other    STUDIES & IMAGING: (EEG, CT, MR, U/S, TTE/TIA):

## 2023-08-31 NOTE — DIETITIAN INITIAL EVALUATION ADULT - ENERGY INTAKE
Dosing wt (8/29): 193.2 lbs Dosing wt (8/29): 193.2 lbs  Per family, UBW ~200 lbs. Will continue to monitor/trend.

## 2023-08-31 NOTE — DIETITIAN INITIAL EVALUATION ADULT - NSFNSGIIOFT_GEN_A_CORE
-Pt receiving NaCl 3% with sodium acetate 50:50, infusing at 50ml/hr. Na goal 145-155.   -No documented BM's thus far. On bowel regimen (senna, Miralax).

## 2023-08-31 NOTE — SWALLOW BEDSIDE ASSESSMENT ADULT - SWALLOW EVAL: DIAGNOSIS
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 Pt now seen for reassessment of swallow due to change in status. Pt presents with evidence of an oropharyngeal dysphagia characterized delayed oral transit time, delayed pharyngeal swallows, reduced hyolaryngeal excursion upon palpation, and s/s suggestive of laryngeal penetration/aspiration on thin liquid. This represents a change from initial bedside swallow evaluation on 8/28/23 requiring downgrade in diet.
Pt presents with a mild oral stage dysphagia, likely superimposed upon cognitive deficits. Swallow sequence is marked by reduced biting/tearing of hard solids, possibly behavioral in nature is pt's daughter stated "he does not want more to eat." Adequate bolus manipulation and transport with fairly timely swallow initiation upon palpation. No overt signs or symptoms of penetration or aspiration across consistencies. Clear/dry vocal quality.

## 2023-08-31 NOTE — DIETITIAN INITIAL EVALUATION ADULT - PERTINENT LABORATORY DATA
08-31    148<H>  |  107  |  22  ----------------------------<  128<H>  4.2   |  25  |  0.93    Ca    8.6      31 Aug 2023 06:04  Phos  2.3     08-31  Mg     1.8     08-31    A1C with Estimated Average Glucose Result: 5.7 % (08-29-23 @ 06:22)

## 2023-08-31 NOTE — PROGRESS NOTE ADULT - SUBJECTIVE AND OBJECTIVE BOX
Subjective: Patient seen and examined. No new events except as noted.   rem[facility]     REVIEW OF SYSTEMS:  Unable to obtain    MEDICATIONS:  MEDICATIONS  (STANDING):  aspirin Suppository 300 milliGRAM(s) Rectal daily  atorvastatin 80 milliGRAM(s) Oral at bedtime  chlorhexidine 4% Liquid 1 Application(s) Topical daily  enoxaparin Injectable 40 milliGRAM(s) SubCutaneous <User Schedule>  polyethylene glycol 3350 17 Gram(s) Oral daily  senna 2 Tablet(s) Oral at bedtime  sodium chloride 3% + sodium acetate 50:50 1000 milliLiter(s) (50 mL/Hr) IV Continuous <Continuous>  sodium chloride 3% + sodium acetate 50:50 1000 milliLiter(s) (25 mL/Hr) IV Continuous <Continuous>      PHYSICAL EXAM:  T(C): 36.9 (08-31-23 @ 07:00), Max: 37.6 (08-31-23 @ 03:00)  HR: 68 (08-31-23 @ 08:00) (60 - 80)  BP: 162/93 (08-31-23 @ 08:00) (143/52 - 167/73)  RR: 26 (08-31-23 @ 08:00) (13 - 31)  SpO2: 95% (08-31-23 @ 08:00) (90% - 100%)  Wt(kg): --  I&O's Summary    30 Aug 2023 07:01  -  31 Aug 2023 07:00  --------------------------------------------------------  IN: 1799.9 mL / OUT: 750 mL / NET: 1049.9 mL    31 Aug 2023 07:01  -  31 Aug 2023 09:34  --------------------------------------------------------  IN: 150 mL / OUT: 0 mL / NET: 150 mL      Appearance: NAD  HEENT:   Dry oral mucosa, PERRL, EOMI	  Lymphatic: No lymphadenopathy  Cardiovascular: Irregular  S1 S2, No JVD, No murmurs, No edema  Respiratory: Lungs clear to auscultation	  Psychiatry: A & O x 1 sleepy   Gastrointestinal:  Soft, Non-tender, + BS	  Skin: No rashes, No ecchymoses, No cyanosis	  Neurological: Arousable, PERRL, EOMI, No Gaze Preference, Face Symmetrical,   Motor exam:          Upper extremity                         Delt     Bicep     Tricep    HG                                                 R         5/5        5/5        5/5       5/5                                               L          5/5        5/5        5/5       5/5  Extremities: Normal range of motion, No clubbing, cyanosis or edema  Vascular: Peripheral pulses palpable 2+ bilaterally      LABS:    CARDIAC MARKERS:                                13.3   10.55 )-----------( 164      ( 31 Aug 2023 00:39 )             40.4     08-31    148<H>  |  107  |  22  ----------------------------<  128<H>  4.2   |  25  |  0.93    Ca    8.6      31 Aug 2023 06:04  Phos  2.3     08-31  Mg     1.8     08-31      TELEMETRY: 	AF    ECG:  	  RADIOLOGY:  < from: CT Head No Cont (08.30.23 @ 09:12) >    ACC: [id number] EXAM:  CT BRAIN   ORDERED BY: MESHA YANEZ     PROCEDURE DATE:  08/30/2023          INTERPRETATION:  Noncontrast CT of the brain.    CLINICAL INDICATION: Follow-up hemorrhagic right cerebellar hemisphere   infarction    TECHNIQUE : Axial CT scanning of the brain was obtained from the skull   base to the vertex without the administration of intravenous contrast.   Sagittal and coronal reformats were provided.    COMPARISON: CT brain 8/29/2023    FINDINGS:    Similar-appearing acute right lateral cerebellar infarct with similar   hemorrhagic transformation.    Similar mild mass effect upon the fourth ventricle.    Redemonstration of chronic left posterolateral temporal infarct.    No midline shift or effacement of basal cisterns. No hydrocephalus.    IMPRESSION:    No significant interval change from 8/29/2023.    Similar-appearing acute right lateral cerebellar infarct with similar   hemorrhagic transformation.    --- End of Report ---            MELLISA HOOKS MD; Attending Radiologist  This document has been electronically signed. Aug 30 2023  9:48AM    < end of copied text >    DIAGNOSTIC TESTING:  [ ] Echocardiogram:  [ ]  Catheterization:  [ ] Stress Test:    OTHER:

## 2023-09-01 LAB
ANION GAP SERPL CALC-SCNC: 10 MMOL/L — SIGNIFICANT CHANGE UP (ref 5–17)
ANION GAP SERPL CALC-SCNC: 11 MMOL/L — SIGNIFICANT CHANGE UP (ref 5–17)
ANION GAP SERPL CALC-SCNC: 13 MMOL/L — SIGNIFICANT CHANGE UP (ref 5–17)
BUN SERPL-MCNC: 20 MG/DL — SIGNIFICANT CHANGE UP (ref 7–23)
BUN SERPL-MCNC: 21 MG/DL — SIGNIFICANT CHANGE UP (ref 7–23)
BUN SERPL-MCNC: 21 MG/DL — SIGNIFICANT CHANGE UP (ref 7–23)
CALCIUM SERPL-MCNC: 8.8 MG/DL — SIGNIFICANT CHANGE UP (ref 8.4–10.5)
CALCIUM SERPL-MCNC: 8.8 MG/DL — SIGNIFICANT CHANGE UP (ref 8.4–10.5)
CALCIUM SERPL-MCNC: 9.1 MG/DL — SIGNIFICANT CHANGE UP (ref 8.4–10.5)
CHLORIDE SERPL-SCNC: 107 MMOL/L — SIGNIFICANT CHANGE UP (ref 96–108)
CHLORIDE SERPL-SCNC: 108 MMOL/L — SIGNIFICANT CHANGE UP (ref 96–108)
CHLORIDE SERPL-SCNC: 109 MMOL/L — HIGH (ref 96–108)
CO2 SERPL-SCNC: 29 MMOL/L — SIGNIFICANT CHANGE UP (ref 22–31)
CO2 SERPL-SCNC: 30 MMOL/L — SIGNIFICANT CHANGE UP (ref 22–31)
CO2 SERPL-SCNC: 31 MMOL/L — SIGNIFICANT CHANGE UP (ref 22–31)
CREAT SERPL-MCNC: 0.82 MG/DL — SIGNIFICANT CHANGE UP (ref 0.5–1.3)
CREAT SERPL-MCNC: 0.89 MG/DL — SIGNIFICANT CHANGE UP (ref 0.5–1.3)
CREAT SERPL-MCNC: 0.91 MG/DL — SIGNIFICANT CHANGE UP (ref 0.5–1.3)
EGFR: 85 ML/MIN/1.73M2 — SIGNIFICANT CHANGE UP
EGFR: 86 ML/MIN/1.73M2 — SIGNIFICANT CHANGE UP
EGFR: 88 ML/MIN/1.73M2 — SIGNIFICANT CHANGE UP
GLUCOSE SERPL-MCNC: 134 MG/DL — HIGH (ref 70–99)
GLUCOSE SERPL-MCNC: 153 MG/DL — HIGH (ref 70–99)
GLUCOSE SERPL-MCNC: 159 MG/DL — HIGH (ref 70–99)
MAGNESIUM SERPL-MCNC: 2.1 MG/DL — SIGNIFICANT CHANGE UP (ref 1.6–2.6)
PHOSPHATE SERPL-MCNC: 2.9 MG/DL — SIGNIFICANT CHANGE UP (ref 2.5–4.5)
POTASSIUM SERPL-MCNC: 3.6 MMOL/L — SIGNIFICANT CHANGE UP (ref 3.5–5.3)
POTASSIUM SERPL-MCNC: 3.8 MMOL/L — SIGNIFICANT CHANGE UP (ref 3.5–5.3)
POTASSIUM SERPL-MCNC: 3.8 MMOL/L — SIGNIFICANT CHANGE UP (ref 3.5–5.3)
POTASSIUM SERPL-SCNC: 3.6 MMOL/L — SIGNIFICANT CHANGE UP (ref 3.5–5.3)
POTASSIUM SERPL-SCNC: 3.8 MMOL/L — SIGNIFICANT CHANGE UP (ref 3.5–5.3)
POTASSIUM SERPL-SCNC: 3.8 MMOL/L — SIGNIFICANT CHANGE UP (ref 3.5–5.3)
SODIUM SERPL-SCNC: 149 MMOL/L — HIGH (ref 135–145)
SODIUM SERPL-SCNC: 149 MMOL/L — HIGH (ref 135–145)
SODIUM SERPL-SCNC: 150 MMOL/L — HIGH (ref 135–145)

## 2023-09-01 PROCEDURE — 70450 CT HEAD/BRAIN W/O DYE: CPT | Mod: 26

## 2023-09-01 PROCEDURE — 99233 SBSQ HOSP IP/OBS HIGH 50: CPT

## 2023-09-01 PROCEDURE — 70551 MRI BRAIN STEM W/O DYE: CPT | Mod: 26

## 2023-09-01 RX ORDER — SODIUM CHLORIDE 5 G/100ML
1000 INJECTION, SOLUTION INTRAVENOUS
Refills: 0 | Status: DISCONTINUED | OUTPATIENT
Start: 2023-09-01 | End: 2023-09-01

## 2023-09-01 RX ORDER — SODIUM CHLORIDE 5 G/100ML
1000 INJECTION, SOLUTION INTRAVENOUS
Refills: 0 | Status: DISCONTINUED | OUTPATIENT
Start: 2023-09-01 | End: 2023-09-02

## 2023-09-01 RX ADMIN — Medication 1 PACKET(S): at 11:35

## 2023-09-01 RX ADMIN — ATORVASTATIN CALCIUM 80 MILLIGRAM(S): 80 TABLET, FILM COATED ORAL at 21:19

## 2023-09-01 RX ADMIN — SODIUM CHLORIDE 30 MILLILITER(S): 5 INJECTION, SOLUTION INTRAVENOUS at 13:06

## 2023-09-01 RX ADMIN — SIMETHICONE 80 MILLIGRAM(S): 80 TABLET, CHEWABLE ORAL at 11:36

## 2023-09-01 RX ADMIN — ENOXAPARIN SODIUM 40 MILLIGRAM(S): 100 INJECTION SUBCUTANEOUS at 17:05

## 2023-09-01 RX ADMIN — AMLODIPINE BESYLATE 5 MILLIGRAM(S): 2.5 TABLET ORAL at 06:00

## 2023-09-01 RX ADMIN — CHLORHEXIDINE GLUCONATE 1 APPLICATION(S): 213 SOLUTION TOPICAL at 11:35

## 2023-09-01 RX ADMIN — Medication 81 MILLIGRAM(S): at 11:36

## 2023-09-01 RX ADMIN — SODIUM CHLORIDE 50 MILLILITER(S): 5 INJECTION, SOLUTION INTRAVENOUS at 07:41

## 2023-09-01 NOTE — PROGRESS NOTE ADULT - ATTENDING COMMENTS
I spoke with the patient , his daughter and granddaughter who helped with translation. I reviewed new CT head findings, discussion with Neuro ICU and NeuroSx for close monitoring of cerebral edema from large right cerebellar infarct
2: unchnaged exam overnight, respiratory status stable  stroke core measures, ASA/statin  afib rate controlled   -160, HTN, added amlodipine  Na 145-155 goal, 2% @50  metamucil added for loose bowel movements; last BM 9/1  stable for transfer to stroke  afebrile  lovenox ppx  not critically ill but medically complex

## 2023-09-01 NOTE — PROGRESS NOTE ADULT - SUBJECTIVE AND OBJECTIVE BOX
Patient seen and examined at bedside.    --Anticoagulation--  aspirin  chewable 81 milliGRAM(s) Oral daily  enoxaparin Injectable 40 milliGRAM(s) SubCutaneous <User Schedule>    T(C): 36.9 (09-01-23 @ 03:00), Max: 37.3 (08-31-23 @ 23:00)  HR: 60 (09-01-23 @ 03:00) (60 - 83)  BP: 138/71 (09-01-23 @ 03:00) (121/61 - 179/83)  RR: 24 (09-01-23 @ 03:00) (13 - 30)  SpO2: 100% (09-01-23 @ 03:00) (90% - 100%)  Wt(kg): --    Exam: AOx3 no aphasia, No facial asymmetry, no nystagmus, no dysarthria, tongue midline, no drift, normal fine finger, no dysmetria, BLAKE 5/5

## 2023-09-01 NOTE — PROGRESS NOTE ADULT - ASSESSMENT
82M R-handed PMHx Afib (not on AC), HTN, and strokes (2021, 2022; residual deficit of dysarthria) presenting with sudden onset "dizziness." No prior h/o similar symptoms. Vitals notable for systolic >180s. CBC and CMP WNL. CTH showing chronic left parietotemporal infarct but no acute findings. CTA neck w/o flow-limiting stenosis but CTA head demonstrating high-grade narrowing of proximal basilar artery with patency of distal basilar artery and its branches. Pt not a candidate for tenecteplase due to non-disabling symptoms and not a thrombectomy candidate due to no LVO seen on CTA H/N. Course c/b hemorrhagic transformation of R cerebellar infarct with mass effect, after which pt transferred to NSCU for monitoring.    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 Mechanism of stroke potentially cardioembolic due to Afib.     NEURO: Previously pt's mental status had decreased, and given R cerebellar infarct with mass effect and hemorrhagic conversion, Eliquis held and pt transfered to NSCU for closer monitoring. Physical therapy/OT/Speech eval/treatment. Recommend repeat MRI w/o contrast to evaluate potential evolution of underlying ischemic stroke and hemorrhagic transformation. If repeat MRI stable, maybe resume Eliquis. Plan to transfer back to stroke unit. Hyperosmolar therapy as per NSCU team.     ANTITHROMBOTIC THERAPY: Anticoagulation held. For now, recommending aspirin 81qd and Lovenox 40 qd (DVT ppx, not therapeutic level anticoagulation). If repeat MRI stable, may resume Eliquis.    PULMONARY: CXR clear, protecting airway, saturating well     CARDIOVASCULAR: TTE EF 55-60%, continue cardiac monitoring                              SBP goal: < 140    GASTROINTESTINAL: Swallow eval recommending pureed     Diet: Pureed with moderately thick liquids    RENAL: BUN/Cr stable, good urine output. Started 2% @ 30 for Na goal 145-150.      Na Goal: 145-150     Ying: No    HEMATOLOGY: H/H stable, Platelets 164     DVT ppx: Lovenox    ID: Afebrile, mild leukocytosis     OTHER: Plan discussed with patient and family at beside.     DISPOSITION: Rehab once stable and workup is complete

## 2023-09-01 NOTE — PROGRESS NOTE ADULT - ASSESSMENT
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     NEURO  Neurochecks Q2  Hydrocephalus watch   3% at 75 ml/hr, BMP q 6 hr, sodium goal 145-155  repeat CTH: stable  ASA 81 mg PO  As per neuro MRI to be ordered  Activity: [] OOB as tolerated [] Bedrest [x] PT [x] OT [] PMNR    PULM  4L NC  sat > 94%    CV  a fib   Rate controlled  SBP goal 100-160 mmhg     RENAL  Fluids: 3% at 50 ccs/hour  Monitor BMP Q 6    GI  Diet: Puree diet  GI prophylaxis [] not indicated [] PPI [] other:  Bowel regimen [x] colace [x] senna [] other:    ENDO:   Goal euglycemia (-180)    HEME/ONC:  VTE prophylaxis: [] SCDs [x] chemoprophylaxis [] high risk of DVT/PE on admission due to:  LE doppler negative 8/30    ID  Afebrile   WC: 10.55    SOCIAL/FAMILY:  [x] updated at bedside [] family meeting    CODE STATUS:  [x] Full Code [] DNR [] DNI [] Palliative/Comfort Care    DISPOSITION:  [x] ICU [] Stroke Unit [] Floor [] EMU [] RCU [] PCU    [] Patient is at high risk of neurologic deterioration/death due to: posterior fossa hemorrhage     Time seen:  Time spent: _30__ [] critical care minutes ASSESSMENT/PLAN: 82M with a PMHx of Afib (not on AC for 1 year ), HTN, and CVAs (2021, 2022; residual deficit of dysarthria) who was 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     NEURO  Neurochecks Q2  Hydrocephalus watch   2% at 50 ml/hr, BMP q 12 hr, sodium goal 145-155  repeat CTH: stable  ASA 81 mg PO  As per neuro MRI to be ordered  Activity: [] OOB as tolerated [] Bedrest [x] PT [x] OT [] PMNR    PULM  2L NC  sat > 94%    CV  a fib   Rate controlled  SBP goal 100-160 mmhg     RENAL  Fluids: 2% at 50 ccs/hour  Monitor BMP Q 12    GI  Diet: Puree diet  GI prophylaxis [] not indicated [] PPI [] other:  Bowel regimen [x] colace [x] senna [] other:    ENDO:   Goal euglycemia (-180)    HEME/ONC:  VTE prophylaxis: [] SCDs [x] chemoprophylaxis [] high risk of DVT/PE on admission due to:  LE doppler negative 09/01    ID  Afebrile   WC: 10.55    SOCIAL/FAMILY:  [x] updated at bedside [] family meeting    CODE STATUS:  [x] Full Code [] DNR [] DNI [] Palliative/Comfort Care    DISPOSITION:  [x] ICU [] Stroke Unit [] Floor [] EMU [] RCU [] PCU    [] Patient is at high risk of neurologic deterioration/death due to: posterior fossa hemorrhage     Time seen:  Time spent: _30__ [] critical care minutes

## 2023-09-01 NOTE — PROGRESS NOTE ADULT - SUBJECTIVE AND OBJECTIVE BOX
Neurology Progress Note    SUBJECTIVE/OBJECTIVE/INTERVAL EVENTS: Patient seen and examined at bedside w/ neuro attending .     MEDICATIONS  (STANDING):  amLODIPine   Tablet 5 milliGRAM(s) Oral daily  aspirin  chewable 81 milliGRAM(s) Oral daily  atorvastatin 80 milliGRAM(s) Oral at bedtime  chlorhexidine 4% Liquid 1 Application(s) Topical daily  enoxaparin Injectable 40 milliGRAM(s) SubCutaneous <User Schedule>  psyllium Powder 1 Packet(s) Oral every 24 hours  senna 2 Tablet(s) Oral at bedtime  simethicone 80 milliGRAM(s) Chew daily  sodium chloride 2% + sodium acetate 50:50 1000 milliLiter(s) (50 mL/Hr) IV Continuous <Continuous>    MEDICATIONS  (PRN):  acetaminophen     Tablet .. 650 milliGRAM(s) Oral every 6 hours PRN Temp greater or equal to 38C (100.4F), Mild Pain (1 - 3), Moderate Pain (4 - 6), Severe Pain (7 - 10)      VITALS & EXAMINATION:  Vital Signs Last 24 Hrs  T(C): 36.9 (01 Sep 2023 10:00), Max: 37.3 (31 Aug 2023 23:00)  T(F): 98.4 (01 Sep 2023 10:00), Max: 99.1 (31 Aug 2023 23:00)  HR: 62 (01 Sep 2023 10:00) (59 - 83)  BP: 164/97 (01 Sep 2023 10:00) (121/61 - 179/83)  BP(mean): 84 (01 Sep 2023 09:00) (75 - 113)  RR: 28 (01 Sep 2023 10:00) (13 - 30)  SpO2: 96% (01 Sep 2023 10:00) (95% - 100%)    Parameters below as of 01 Sep 2023 10:00  Patient On (Oxygen Delivery Method): nasal cannula  O2 Flow (L/min): 3      General:  Constitutional: Male, appears stated age, nontoxic, not in distress,    Head: Normocephalic;   Eyes: clear sclera;   Extremities: No cyanosis;   Resp: breathing comfortably     NEUROLOGICAL EXAM:  Mental status: Alert, Oriented to self, hospital, not year or age  Cranial Nerves: PERRL, intact BTT b/l, tracks examiner with eyes, No facial asymmetry, mild dysarthria  Motor exam: Normal tone, no drift in b/l UE and LE   Sensation: Intact to light touch   Coordination: Can touch examiner's fingers b/l  Gait: Deferred     LABORATORY:  CBC                       13.3   10.55 )-----------( 164      ( 31 Aug 2023 00:39 )             40.4     Chem 09-01    149<H>  |  107  |  21  ----------------------------<  153<H>  3.8   |  29  |  0.91    Ca    8.8      01 Sep 2023 06:00  Phos  2.9     09-01  Mg     2.1     09-01      LFTs   Coagulopathy   Lipid Panel 08-29 Chol 115 LDL -- HDL 51 Trig 59  A1c   Cardiac enzymes     U/A Urinalysis Basic - ( 01 Sep 2023 06:00 )    Color: x / Appearance: x / SG: x / pH: x  Gluc: 153 mg/dL / Ketone: x  / Bili: x / Urobili: x   Blood: x / Protein: x / Nitrite: x   Leuk Esterase: x / RBC: x / WBC x   Sq Epi: x / Non Sq Epi: x / Bacteria: x      CSF  Immunological  Other    STUDIES & IMAGING: (EEG, CT, MR, U/S, TTE/TIA):

## 2023-09-01 NOTE — PROGRESS NOTE ADULT - SUBJECTIVE AND OBJECTIVE BOX
HOSPITAL COURSE: 82M with a PMHx of Afib (not on AC for 1 year ), HTN, and CVAs (2021, 2022; residual deficit of dysarthria) who presented with a chief complaint of dizziness. Patient during the admission stated that the room as spinning around him. During admission denied any CP/SOB/N/V.     As per admitting note: LKN: 0000 8/28/23. NIHSS: 1 (dysarthria). mRS: 1.     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  08/28: Acute infarct in the right anterior cerebellum with associated internal hemorrhagic blood products. CTH: Evolving acute right cerebellar infarct. Eliquis held.   8/29 transferred to the NSCU for worsening neuro exam   8/30 Stable CT Head on hypertonics  8/31 Stable, Repeat CTH stable, monitor Na on 3% @ 75    Allergies    No Known Allergies    Intolerances    REVIEW OF SYSTEMS: [ ] Unable to Assess due to neurologic exam   [ ] All ROS addressed below are non-contributory, except:  Neuro: [ ] Headache [ ] Back pain [ ] Numbness [ ] Weakness [ ] Ataxia [x ] Dizziness [ ] Aphasia [ ] Dysarthria [ ] Visual disturbance  Resp: [ ] Shortness of breath/dyspnea, [ ] Orthopnea [ ] Cough  CV: [ ] Chest pain [ ] Palpitation [ ] Lightheadedness [ ] Syncope  Renal: [ ] Thirst [ ] Edema  GI: [ ] Nausea [ ] Emesis [ ] Abdominal pain [ ] Constipation [ ] Diarrhea  Hem: [ ] Hematemesis [ ] bright red blood per rectum  ID: [ ] Fever [ ] Chills [ ] Dysuria  ENT: [ ] Rhinorrhea      DEVICES:   [ ] Restraints [ ] ET tube [ ] central line [ ] arterial line [ ] pretty [ ] NGT/OGT [ ] EVD [ ] LD [ ] NIMESH/HMV [ ] Trach [ ] PEG [ ] Chest Tube       T(C): 36.9 (08-31-23 @ 07:00), Max: 37.6 (08-31-23 @ 03:00)  HR: 68 (08-31-23 @ 08:00) (60 - 80)  BP: 162/93 (08-31-23 @ 08:00) (143/52 - 167/73)  RR: 26 (08-31-23 @ 08:00) (13 - 31)  SpO2: 95% (08-31-23 @ 08:00) (90% - 100%)  08-30-23 @ 07:01  -  08-31-23 @ 07:00  --------------------------------------------------------  IN: 1799.9 mL / OUT: 750 mL / NET: 1049.9 mL    08-31-23 @ 07:01  -  08-31-23 @ 08:29  --------------------------------------------------------  IN: 150 mL / OUT: 0 mL / NET: 150 mL    acetaminophen     Tablet .. 650 milliGRAM(s) Oral every 6 hours PRN  aspirin Suppository 300 milliGRAM(s) Rectal daily  atorvastatin 80 milliGRAM(s) Oral at bedtime  chlorhexidine 4% Liquid 1 Application(s) Topical daily  enoxaparin Injectable 40 milliGRAM(s) SubCutaneous <User Schedule>  polyethylene glycol 3350 17 Gram(s) Oral daily  senna 2 Tablet(s) Oral at bedtime  sodium chloride 3% + sodium acetate 50:50 1000 milliLiter(s) IV Continuous <Continuous>  sodium chloride 3% + sodium acetate 50:50 1000 milliLiter(s) IV Continuous <Continuous>        LABS:                          13.3   10.55 )-----------( 164      ( 31 Aug 2023 00:39 )             40.4   08-31    148<H>  |  107  |  22  ----------------------------<  128<H>  4.2   |  25  |  0.93    Ca    8.6      31 Aug 2023 06:04  Phos  2.3     08-31  Mg     1.8     08-31                                  IMAGING:  < from: CT Head No Cont (08.30.23 @ 09:12) >  IMPRESSION:    No significant interval change from 8/29/2023.    Similar-appearing acute right lateral cerebellar infarct with similar   hemorrhagic transformation.    < end of copied text >      < from: [facility] Duplex Lower Ext Vein Scan, Bilat (08.30.23 @ 12:29) >  IMPRESSION:  No evidence of deep venous thrombosis in either lower extremity.    < end of copied text >      EXAMINATION:  PHYSICAL EXAM:    Constitutional: No Acute Distress     Neurological: Arousable, PERRL, EOMI, No Gaze Preference, Face Symmetrical, Mandarin Speaking  Motor exam:          Upper extremity                         Delt     Bicep     Tricep    HG                                                 R         5/5 5/5 5/5 5/5                                               L          5/5 5/5 5/5 5/5          Pulmonary: Clear to Auscultation, No rales, No rhonchi, No wheezes   Cardiovascular: S1, S2, Regular rate and rhythm   Gastrointestinal: Soft, Non-tender, Non-distended         LABS:  Na: 148 (08-31 @ 06:04), 144 (08-31 @ 00:39), 140 (08-30 @ 13:48), 141 (08-30 @ 06:04), 140 (08-29 @ 22:54), 140 (08-29 @ 06:22)  K: 4.2 (08-31 @ 06:04), 4.0 (08-31 @ 00:39), 6.0 (08-30 @ 13:48), 3.8 (08-30 @ 06:04), 3.5 (08-29 @ 22:54), 3.1 (08-29 @ 06:22)  Cl: 107 (08-31 @ 06:04), 106 (08-31 @ 00:39), 105 (08-30 @ 13:48), 105 (08-30 @ 06:04), 105 (08-29 @ 22:54), 103 (08-29 @ 06:22)  CO2: 25 (08-31 @ 06:04), 25 (08-31 @ 00:39), 25 (08-30 @ 13:48), 23 (08-30 @ 06:04), 24 (08-29 @ 22:54), 24 (08-29 @ 06:22)  BUN: 22 (08-31 @ 06:04), 22 (08-31 @ 00:39), 22 (08-30 @ 13:48), 22 (08-30 @ 06:04), 21 (08-29 @ 22:54), 16 (08-29 @ 06:22)  Cr: 0.93 (08-31 @ 06:04), 0.91 (08-31 @ 00:39), 0.86 (08-30 @ 13:48), 1.00 (08-30 @ 06:04), 1.07 (08-29 @ 22:54), 1.10 (08-29 @ 06:22)  Glu: 128(08-31 @ 06:04), 122(08-31 @ 00:39), 133(08-30 @ 13:48), 116(08-30 @ 06:04), 122(08-29 @ 22:54), 141(08-29 @ 06:22)    Hgb: 13.3 (08-31 @ 00:39), 15.0 (08-29 @ 22:54), 14.6 (08-29 @ 06:22)  Hct: 40.4 (08-31 @ 00:39), 44.2 (08-29 @ 22:54), 43.5 (08-29 @ 06:22)  WBC: 10.55 (08-31 @ 00:39), 13.12 (08-29 @ 22:54), 10.51 (08-29 @ 06:22)  Plt: 164 (08-31 @ 00:39), 154 (08-29 @ 22:54), 164 (08-29 @ 06:22)    INR: 1.33 08-30-23 @ 06:04, 1.37 08-29-23 @ 17:21  PTT: 32.3 08-30-23 @ 06:04, 34.4 08-29-23 @ 17:21                   HOSPITAL COURSE: 82M with a PMHx of Afib (not on AC for 1 year ), HTN, and CVAs (2021, 2022; residual deficit of dysarthria) who presented with a chief complaint of dizziness. Patient during the admission stated that the room as spinning around him. During admission denied any CP/SOB/N/V.     As per admitting note: LKN: 0000 8/28/23. NIHSS: 1 (dysarthria). mRS: 1.     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  08/28: Acute infarct in the right anterior cerebellum with associated internal hemorrhagic blood products. CTH: Evolving acute right cerebellar infarct. Eliquis held.   8/29 transferred to the NSCU for worsening neuro exam   8/30 Stable CT Head on hypertonics  8/31 Stable, Repeat CTH stable, monitor Na on 3% @ 75    Allergies    No Known Allergies    Intolerances    REVIEW OF SYSTEMS: [ ] Unable to Assess due to neurologic exam   [ ] All ROS addressed below are non-contributory, except:  Neuro: [ ] Headache [ ] Back pain [ ] Numbness [ ] Weakness [ ] Ataxia [x ] Dizziness [ ] Aphasia [ ] Dysarthria [ ] Visual disturbance  Resp: [ ] Shortness of breath/dyspnea, [ ] Orthopnea [ ] Cough  CV: [ ] Chest pain [ ] Palpitation [ ] Lightheadedness [ ] Syncope  Renal: [ ] Thirst [ ] Edema  GI: [ ] Nausea [ ] Emesis [ ] Abdominal pain [ ] Constipation [ ] Diarrhea  Hem: [ ] Hematemesis [ ] bright red blood per rectum  ID: [ ] Fever [ ] Chills [ ] Dysuria  ENT: [ ] Rhinorrhea      DEVICES:   [ ] Restraints [ ] ET tube [ ] central line [ ] arterial line [ ] pretty [ ] NGT/OGT [ ] EVD [ ] LD [ ] NIMESH/HMV [ ] Trach [ ] PEG [ ] Chest Tube     ICU Vital Signs Last 24 Hrs  T(C): 36.9 (01 Sep 2023 03:00), Max: 37.3 (31 Aug 2023 23:00)  T(F): 98.5 (01 Sep 2023 03:00), Max: 99.1 (31 Aug 2023 23:00)  HR: 66 (01 Sep 2023 06:00) (59 - 83)  BP: 174/87 (01 Sep 2023 06:00) (121/61 - 179/83)  BP(mean): 111 (01 Sep 2023 06:00) (75 - 114)  ABP: --  ABP(mean): --  RR: 22 (01 Sep 2023 06:00) (13 - 30)  SpO2: 99% (01 Sep 2023 06:00) (90% - 100%)    O2 Parameters below as of 31 Aug 2023 19:00  Patient On (Oxygen Delivery Method): nasal cannula  O2 Flow (L/min): 2    MEDICATIONS  (STANDING):  amLODIPine   Tablet 5 milliGRAM(s) Oral daily  aspirin  chewable 81 milliGRAM(s) Oral daily  atorvastatin 80 milliGRAM(s) Oral at bedtime  chlorhexidine 4% Liquid 1 Application(s) Topical daily  enoxaparin Injectable 40 milliGRAM(s) SubCutaneous <User Schedule>  polyethylene glycol 3350 17 Gram(s) Oral two times a day  psyllium Powder 1 Packet(s) Oral every 24 hours  senna 2 Tablet(s) Oral at bedtime  simethicone 80 milliGRAM(s) Chew daily  sodium chloride 2% + sodium acetate 50:50 1000 milliLiter(s) (50 mL/Hr) IV Continuous <Continuous>    MEDICATIONS  (PRN):  acetaminophen     Tablet .. 650 milliGRAM(s) Oral every 6 hours PRN Temp greater or equal to 38C (100.4F), Mild Pain (1 - 3), Moderate Pain (4 - 6), Severe Pain (7 - 10)    LABS:                          13.3   10.55 )-----------( 164      ( 31 Aug 2023 00:39 )             40.4   09-01    149<H>  |  107  |  21  ----------------------------<  153<H>  3.8   |  29  |  0.91    Ca    8.8      01 Sep 2023 06:00  Phos  2.9     09-01  Mg     2.1     09-01          IMAGING:  < from: CT Head No Cont (08.30.23 @ 09:12) >  IMPRESSION:    No significant interval change from 8/29/2023.    Similar-appearing acute right lateral cerebellar infarct with similar   hemorrhagic transformation.    < end of copied text >      < from: [facility] Duplex Lower Ext Vein Scan, Bilat (08.30.23 @ 12:29) >  IMPRESSION:  No evidence of deep venous thrombosis in either lower extremity.    < end of copied text >      EXAMINATION:  PHYSICAL EXAM:    Constitutional: No Acute Distress     Neurological: Arousable, PERRL, EOMI, No Gaze Preference, Face Symmetrical, Mandarin Speaking  Motor exam:          Upper extremity                         Delt     Bicep     Tricep    HG                                                 R         5/5 5/5 5/5       5/5                                               L          5/5 5/5        5/5 5/5          Pulmonary: Clear to Auscultation, No rales, No rhonchi, No wheezes   Cardiovascular: S1, S2, Regular rate and rhythm   Gastrointestinal: Soft, Non-tender, Non-distended         LABS:  Na: 148 (08-31 @ 06:04), 144 (08-31 @ 00:39), 140 (08-30 @ 13:48), 141 (08-30 @ 06:04), 140 (08-29 @ 22:54), 140 (08-29 @ 06:22)  K: 4.2 (08-31 @ 06:04), 4.0 (08-31 @ 00:39), 6.0 (08-30 @ 13:48), 3.8 (08-30 @ 06:04), 3.5 (08-29 @ 22:54), 3.1 (08-29 @ 06:22)  Cl: 107 (08-31 @ 06:04), 106 (08-31 @ 00:39), 105 (08-30 @ 13:48), 105 (08-30 @ 06:04), 105 (08-29 @ 22:54), 103 (08-29 @ 06:22)  CO2: 25 (08-31 @ 06:04), 25 (08-31 @ 00:39), 25 (08-30 @ 13:48), 23 (08-30 @ 06:04), 24 (08-29 @ 22:54), 24 (08-29 @ 06:22)  BUN: 22 (08-31 @ 06:04), 22 (08-31 @ 00:39), 22 (08-30 @ 13:48), 22 (08-30 @ 06:04), 21 (08-29 @ 22:54), 16 (08-29 @ 06:22)  Cr: 0.93 (08-31 @ 06:04), 0.91 (08-31 @ 00:39), 0.86 (08-30 @ 13:48), 1.00 (08-30 @ 06:04), 1.07 (08-29 @ 22:54), 1.10 (08-29 @ 06:22)  Glu: 128(08-31 @ 06:04), 122(08-31 @ 00:39), 133(08-30 @ 13:48), 116(08-30 @ 06:04), 122(08-29 @ 22:54), 141(08-29 @ 06:22)    Hgb: 13.3 (08-31 @ 00:39), 15.0 (08-29 @ 22:54), 14.6 (08-29 @ 06:22)  Hct: 40.4 (08-31 @ 00:39), 44.2 (08-29 @ 22:54), 43.5 (08-29 @ 06:22)  WBC: 10.55 (08-31 @ 00:39), 13.12 (08-29 @ 22:54), 10.51 (08-29 @ 06:22)  Plt: 164 (08-31 @ 00:39), 154 (08-29 @ 22:54), 164 (08-29 @ 06:22)    INR: 1.33 08-30-23 @ 06:04, 1.37 08-29-23 @ 17:21  PTT: 32.3 08-30-23 @ 06:04, 34.4 08-29-23 @ 17:21                   HOSPITAL COURSE: 82M with a PMHx of Afib (not on AC for 1 year ), HTN, and CVAs (2021, 2022; residual deficit of dysarthria) who presented with a chief complaint of dizziness. Patient during the admission stated that the room as spinning around him. During admission denied any CP/SOB/N/V.     As per admitting note: LKN: 0000 8/28/23. NIHSS: 1 (dysarthria). mRS: 1.     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  08/28: Acute infarct in the right anterior cerebellum with associated internal hemorrhagic blood products. CTH: Evolving acute right cerebellar infarct. Eliquis held.   8/29 transferred to the NSCU for worsening neuro exam   8/30 Stable CT Head on hypertonics  8/31 Stable, Repeat CTH stable, monitor Na on 3% @ 75  09/01: CTH stable, pending downgrade to stroke unit    Allergies    No Known Allergies    Intolerances    REVIEW OF SYSTEMS: [ ] Unable to Assess due to neurologic exam   [ ] All ROS addressed below are non-contributory, except:  Neuro: [ ] Headache [ ] Back pain [ ] Numbness [ ] Weakness [ ] Ataxia [x ] Dizziness [ ] Aphasia [ ] Dysarthria [ ] Visual disturbance  Resp: [ ] Shortness of breath/dyspnea, [ ] Orthopnea [ ] Cough  CV: [ ] Chest pain [ ] Palpitation [ ] Lightheadedness [ ] Syncope  Renal: [ ] Thirst [ ] Edema  GI: [ ] Nausea [ ] Emesis [ ] Abdominal pain [ ] Constipation [ ] Diarrhea  Hem: [ ] Hematemesis [ ] bright red blood per rectum  ID: [ ] Fever [ ] Chills [ ] Dysuria  ENT: [ ] Rhinorrhea      DEVICES:   [ ] Restraints [ ] ET tube [ ] central line [ ] arterial line [ ] pretty [ ] NGT/OGT [ ] EVD [ ] LD [ ] NIMESH/HMV [ ] Trach [ ] PEG [ ] Chest Tube     ICU Vital Signs Last 24 Hrs  T(C): 36.9 (01 Sep 2023 03:00), Max: 37.3 (31 Aug 2023 23:00)  T(F): 98.5 (01 Sep 2023 03:00), Max: 99.1 (31 Aug 2023 23:00)  HR: 66 (01 Sep 2023 06:00) (59 - 83)  BP: 174/87 (01 Sep 2023 06:00) (121/61 - 179/83)  BP(mean): 111 (01 Sep 2023 06:00) (75 - 114)  ABP: --  ABP(mean): --  RR: 22 (01 Sep 2023 06:00) (13 - 30)  SpO2: 99% (01 Sep 2023 06:00) (90% - 100%)    O2 Parameters below as of 31 Aug 2023 19:00  Patient On (Oxygen Delivery Method): nasal cannula  O2 Flow (L/min): 2    MEDICATIONS  (STANDING):  amLODIPine   Tablet 5 milliGRAM(s) Oral daily  aspirin  chewable 81 milliGRAM(s) Oral daily  atorvastatin 80 milliGRAM(s) Oral at bedtime  chlorhexidine 4% Liquid 1 Application(s) Topical daily  enoxaparin Injectable 40 milliGRAM(s) SubCutaneous <User Schedule>  polyethylene glycol 3350 17 Gram(s) Oral two times a day  psyllium Powder 1 Packet(s) Oral every 24 hours  senna 2 Tablet(s) Oral at bedtime  simethicone 80 milliGRAM(s) Chew daily  sodium chloride 2% + sodium acetate 50:50 1000 milliLiter(s) (50 mL/Hr) IV Continuous <Continuous>    MEDICATIONS  (PRN):  acetaminophen     Tablet .. 650 milliGRAM(s) Oral every 6 hours PRN Temp greater or equal to 38C (100.4F), Mild Pain (1 - 3), Moderate Pain (4 - 6), Severe Pain (7 - 10)    LABS:                          13.3   10.55 )-----------( 164      ( 31 Aug 2023 00:39 )             40.4   09-01    149<H>  |  107  |  21  ----------------------------<  153<H>  3.8   |  29  |  0.91    Ca    8.8      01 Sep 2023 06:00  Phos  2.9     09-01  Mg     2.1     09-01          IMAGING:  < from: CT Head No Cont (08.30.23 @ 09:12) >  IMPRESSION:    No significant interval change from 8/29/2023.    Similar-appearing acute right lateral cerebellar infarct with similar   hemorrhagic transformation.    < end of copied text >      < from: [facility] Duplex Lower Ext Vein Scan, Bilat (08.30.23 @ 12:29) >  IMPRESSION:  No evidence of deep venous thrombosis in either lower extremity.    < end of copied text >      EXAMINATION:  PHYSICAL EXAM:    Constitutional: No Acute Distress     Neurological: Arousable, PERRL, EOMI, No Gaze Preference, Face Symmetrical, Mandarin Speaking  Motor exam:          Upper extremity                         Delt     Bicep     Tricep    HG                                                 R         5/5 5/5 5/5 5/5                                               L          5/5 5/5 5/5 5/5          Pulmonary: Clear to Auscultation, No rales, No rhonchi, No wheezes   Cardiovascular: S1, S2, irregularly irregular rhythm   Gastrointestinal: Soft, Non-tender, Non-distended         LABS:  Na: 148 (08-31 @ 06:04), 144 (08-31 @ 00:39), 140 (08-30 @ 13:48), 141 (08-30 @ 06:04), 140 (08-29 @ 22:54), 140 (08-29 @ 06:22)  K: 4.2 (08-31 @ 06:04), 4.0 (08-31 @ 00:39), 6.0 (08-30 @ 13:48), 3.8 (08-30 @ 06:04), 3.5 (08-29 @ 22:54), 3.1 (08-29 @ 06:22)  Cl: 107 (08-31 @ 06:04), 106 (08-31 @ 00:39), 105 (08-30 @ 13:48), 105 (08-30 @ 06:04), 105 (08-29 @ 22:54), 103 (08-29 @ 06:22)  CO2: 25 (08-31 @ 06:04), 25 (08-31 @ 00:39), 25 (08-30 @ 13:48), 23 (08-30 @ 06:04), 24 (08-29 @ 22:54), 24 (08-29 @ 06:22)  BUN: 22 (08-31 @ 06:04), 22 (08-31 @ 00:39), 22 (08-30 @ 13:48), 22 (08-30 @ 06:04), 21 (08-29 @ 22:54), 16 (08-29 @ 06:22)  Cr: 0.93 (08-31 @ 06:04), 0.91 (08-31 @ 00:39), 0.86 (08-30 @ 13:48), 1.00 (08-30 @ 06:04), 1.07 (08-29 @ 22:54), 1.10 (08-29 @ 06:22)  Glu: 128(08-31 @ 06:04), 122(08-31 @ 00:39), 133(08-30 @ 13:48), 116(08-30 @ 06:04), 122(08-29 @ 22:54), 141(08-29 @ 06:22)    Hgb: 13.3 (08-31 @ 00:39), 15.0 (08-29 @ 22:54), 14.6 (08-29 @ 06:22)  Hct: 40.4 (08-31 @ 00:39), 44.2 (08-29 @ 22:54), 43.5 (08-29 @ 06:22)  WBC: 10.55 (08-31 @ 00:39), 13.12 (08-29 @ 22:54), 10.51 (08-29 @ 06:22)  Plt: 164 (08-31 @ 00:39), 154 (08-29 @ 22:54), 164 (08-29 @ 06:22)    INR: 1.33 08-30-23 @ 06:04, 1.37 08-29-23 @ 17:21  PTT: 32.3 08-30-23 @ 06:04, 34.4 08-29-23 @ 17:21

## 2023-09-02 LAB
ANION GAP SERPL CALC-SCNC: 10 MMOL/L — SIGNIFICANT CHANGE UP (ref 5–17)
APTT BLD: 29.9 SEC — SIGNIFICANT CHANGE UP (ref 24.5–35.6)
BUN SERPL-MCNC: 20 MG/DL — SIGNIFICANT CHANGE UP (ref 7–23)
CALCIUM SERPL-MCNC: 8.8 MG/DL — SIGNIFICANT CHANGE UP (ref 8.4–10.5)
CHLORIDE SERPL-SCNC: 110 MMOL/L — HIGH (ref 96–108)
CO2 SERPL-SCNC: 28 MMOL/L — SIGNIFICANT CHANGE UP (ref 22–31)
CREAT SERPL-MCNC: 0.83 MG/DL — SIGNIFICANT CHANGE UP (ref 0.5–1.3)
EGFR: 88 ML/MIN/1.73M2 — SIGNIFICANT CHANGE UP
GLUCOSE SERPL-MCNC: 112 MG/DL — HIGH (ref 70–99)
HCT VFR BLD CALC: 41.2 % — SIGNIFICANT CHANGE UP (ref 39–50)
HCT VFR BLD CALC: 42.1 % — SIGNIFICANT CHANGE UP (ref 39–50)
HGB BLD-MCNC: 13 G/DL — SIGNIFICANT CHANGE UP (ref 13–17)
HGB BLD-MCNC: 13.6 G/DL — SIGNIFICANT CHANGE UP (ref 13–17)
MCHC RBC-ENTMCNC: 31.6 GM/DL — LOW (ref 32–36)
MCHC RBC-ENTMCNC: 31.9 PG — SIGNIFICANT CHANGE UP (ref 27–34)
MCHC RBC-ENTMCNC: 32 PG — SIGNIFICANT CHANGE UP (ref 27–34)
MCHC RBC-ENTMCNC: 32.3 GM/DL — SIGNIFICANT CHANGE UP (ref 32–36)
MCV RBC AUTO: 101.2 FL — HIGH (ref 80–100)
MCV RBC AUTO: 99.1 FL — SIGNIFICANT CHANGE UP (ref 80–100)
NRBC # BLD: 0 /100 WBCS — SIGNIFICANT CHANGE UP (ref 0–0)
NRBC # BLD: 0 /100 WBCS — SIGNIFICANT CHANGE UP (ref 0–0)
PLATELET # BLD AUTO: 163 K/UL — SIGNIFICANT CHANGE UP (ref 150–400)
PLATELET # BLD AUTO: 170 K/UL — SIGNIFICANT CHANGE UP (ref 150–400)
POTASSIUM SERPL-MCNC: 3.7 MMOL/L — SIGNIFICANT CHANGE UP (ref 3.5–5.3)
POTASSIUM SERPL-SCNC: 3.7 MMOL/L — SIGNIFICANT CHANGE UP (ref 3.5–5.3)
RBC # BLD: 4.07 M/UL — LOW (ref 4.2–5.8)
RBC # BLD: 4.25 M/UL — SIGNIFICANT CHANGE UP (ref 4.2–5.8)
RBC # FLD: 12.4 % — SIGNIFICANT CHANGE UP (ref 10.3–14.5)
RBC # FLD: 12.6 % — SIGNIFICANT CHANGE UP (ref 10.3–14.5)
SODIUM SERPL-SCNC: 148 MMOL/L — HIGH (ref 135–145)
WBC # BLD: 7.93 K/UL — SIGNIFICANT CHANGE UP (ref 3.8–10.5)
WBC # BLD: 8.19 K/UL — SIGNIFICANT CHANGE UP (ref 3.8–10.5)
WBC # FLD AUTO: 7.93 K/UL — SIGNIFICANT CHANGE UP (ref 3.8–10.5)
WBC # FLD AUTO: 8.19 K/UL — SIGNIFICANT CHANGE UP (ref 3.8–10.5)

## 2023-09-02 PROCEDURE — 74230 X-RAY XM SWLNG FUNCJ C+: CPT | Mod: 26

## 2023-09-02 PROCEDURE — 71045 X-RAY EXAM CHEST 1 VIEW: CPT | Mod: 26

## 2023-09-02 PROCEDURE — 99233 SBSQ HOSP IP/OBS HIGH 50: CPT

## 2023-09-02 RX ORDER — IPRATROPIUM/ALBUTEROL SULFATE 18-103MCG
3 AEROSOL WITH ADAPTER (GRAM) INHALATION EVERY 6 HOURS
Refills: 0 | Status: DISCONTINUED | OUTPATIENT
Start: 2023-09-02 | End: 2023-09-06

## 2023-09-02 RX ORDER — HEPARIN SODIUM 5000 [USP'U]/ML
1000 INJECTION INTRAVENOUS; SUBCUTANEOUS
Qty: 25000 | Refills: 0 | Status: DISCONTINUED | OUTPATIENT
Start: 2023-09-02 | End: 2023-09-03

## 2023-09-02 RX ORDER — APIXABAN 2.5 MG/1
1 TABLET, FILM COATED ORAL
Qty: 60 | Refills: 2
Start: 2023-09-02 | End: 2023-11-30

## 2023-09-02 RX ADMIN — Medication 650 MILLIGRAM(S): at 07:08

## 2023-09-02 RX ADMIN — HEPARIN SODIUM 10 UNIT(S)/HR: 5000 INJECTION INTRAVENOUS; SUBCUTANEOUS at 15:41

## 2023-09-02 RX ADMIN — HEPARIN SODIUM 11 UNIT(S)/HR: 5000 INJECTION INTRAVENOUS; SUBCUTANEOUS at 22:14

## 2023-09-02 RX ADMIN — Medication 3 MILLILITER(S): at 18:41

## 2023-09-02 RX ADMIN — Medication 3 MILLILITER(S): at 23:18

## 2023-09-02 RX ADMIN — ATORVASTATIN CALCIUM 80 MILLIGRAM(S): 80 TABLET, FILM COATED ORAL at 21:46

## 2023-09-02 RX ADMIN — AMLODIPINE BESYLATE 5 MILLIGRAM(S): 2.5 TABLET ORAL at 05:45

## 2023-09-02 RX ADMIN — Medication 1 PACKET(S): at 11:48

## 2023-09-02 RX ADMIN — Medication 650 MILLIGRAM(S): at 06:10

## 2023-09-02 RX ADMIN — Medication 81 MILLIGRAM(S): at 11:47

## 2023-09-02 RX ADMIN — SENNA PLUS 2 TABLET(S): 8.6 TABLET ORAL at 21:46

## 2023-09-02 RX ADMIN — Medication 3 MILLILITER(S): at 11:47

## 2023-09-02 NOTE — SWALLOW VFSS/MBS ASSESSMENT ADULT - COMMENTS
8/28 @ 0152 - failed dysphagia screen  Bedside swallow evaluation completed 8/28-> recommendations at that time included easy to chew and thin liquids  CTH on 8/29 demonstrated hemorrhagic conversion R cerebellar stroke + mass effect on 4th vent when compared to previous CT head.   Pt transferred to NSCU. Difficulty noted in swallowing, patient made NPO and a new consult was placed for reassessment of swallow.  8/31> reevaluation of swallow completed. Recommendations for puree and moderately thick liquids via tsp with MBS.

## 2023-09-02 NOTE — SWALLOW VFSS/MBS ASSESSMENT ADULT - SPECIFY REASON(S)
to subjectively reassess the swallow mechanism and determine safest oral diet consistencies, s/p change in status from initial swallow evaluation 8/28 requiring transfer to NSCU. NKFA

## 2023-09-02 NOTE — PROGRESS NOTE ADULT - ASSESSMENT
82M R-handed PMHx Afib (not on AC), HTN, and strokes (2021, 2022; residual deficit of dysarthria) presenting with sudden onset "dizziness." No prior h/o similar symptoms. Vitals notable for systolic >180s. CBC and CMP WNL. CTH showing chronic left parietotemporal infarct but no acute findings. CTA neck w/o flow-limiting stenosis but CTA head demonstrating high-grade narrowing of proximal basilar artery with patency of distal basilar artery and its branches. Pt not a candidate for tenecteplase due to non-disabling symptoms and not a thrombectomy candidate due to no LVO seen on CTA H/N. Course c/b hemorrhagic transformation of R cerebellar infarct with mass effect, after which pt transferred to NSCU for monitoring.    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 Mechanism of stroke potentially cardioembolic due to Afib.     NEURO: Previously pt's mental status had decreased, and given R cerebellar infarct with mass effect and hemorrhagic conversion, Eliquis held and pt transfered to NSCU for closer monitoring. Physical therapy/OT recc AR. MRI with results as above, repeat MRI w/o contrast to evaluate potential evolution of underlying ischemic stroke and hemorrhagic transformation. If repeat MRI stable, will determine when to resume Eliquis. goal for normotension     ANTITHROMBOTIC THERAPY: Anticoagulation held. For now, c/w aspirin 81qd and Lovenox 40 qd (DVT ppx, not therapeutic level anticoagulation). If repeat MRI stable, may resume Eliquis.    PULMONARY: protecting airway, saturating well on 4L NC     CARDIOVASCULAR: TTE EF 55-60%, continue cardiac monitoring. Has hx of afib. not on AC. Recommend watchman device with cardiology once neurologically stable                  SBP goal: < 140    GASTROINTESTINAL: Swallow eval recommending pureed     Diet: Pureed with moderately thick liquids    RENAL: BUN/Cr stable, good urine output. Now off hypertonic saline      Na Goal: 145-150     Ying: No    HEMATOLOGY: H/H stable, Platelets 163     DVT ppx: Lovenox    ID: Afebrile, resolved leukocytosis     OTHER: Plan discussed with patient and family at beside.     DISPOSITION: acute Rehab once stable and workup is complete   82M R-handed PMHx Afib (not on AC), HTN, and strokes (2021, 2022; residual deficit of dysarthria) presenting with sudden onset "dizziness." No prior h/o similar symptoms. Vitals notable for systolic >180s. CBC and CMP WNL. CTH showing chronic left parietotemporal infarct but no acute findings. CTA neck w/o flow-limiting stenosis but CTA head demonstrating high-grade narrowing of proximal basilar artery with patency of distal basilar artery and its branches. Pt not a candidate for tenecteplase due to non-disabling symptoms and not a thrombectomy candidate due to no LVO seen on CTA H/N. Course c/b hemorrhagic transformation of R cerebellar infarct with mass effect, after which pt transferred to NSCU for monitoring.    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 Mechanism of stroke potentially cardioembolic due to Afib.     NEURO: Previously pt's mental status had decreased, and given R cerebellar infarct with mass effect and hemorrhagic conversion, Eliquis held and pt transfered to NSCU for closer monitoring. Physical therapy/OT recc AR. MRI with results as above, repeat MRI w/o contrast to evaluate potential evolution of underlying ischemic stroke and hemorrhagic transformation. Plan to start eliquis prior to discharge. goal for normotension     ANTITHROMBOTIC THERAPY: Anticoagulation held. For now, c/w aspirin 81qd and Lovenox 40 qd (DVT ppx, not therapeutic level anticoagulation).    PULMONARY: protecting airway, saturating well on 4L NC. pending CXR. douneb q 6 hr     CARDIOVASCULAR: TTE EF 55-60%, continue cardiac monitoring. Has hx of afib, not on AC. Recommend watchman device with cardiology once neurologically stable                  SBP goal: < 140    GASTROINTESTINAL: Swallow eval recommending pureed. S/p MBS with diet upgrade on 9/2      Diet: minced and moist with mildly thick liquids, fed by teaspoon only    RENAL: BUN/Cr stable, good urine output. Now off hypertonic saline      Na Goal: 145-150     Ying: No    HEMATOLOGY: H/H stable, Platelets 163     DVT ppx: Lovenox    ID: Afebrile, resolved leukocytosis     OTHER: Plan discussed with patient and family at beside.     DISPOSITION: acute Rehab once stable and workup is complete. Patient pending medicaid insurance     CORE MEASURES:        Admission NIHSS: 1     TPA: [] YES [x] NO      LDL/HDL: 51/51     Depression Screen: no      Statin Therapy: yes     Dysphagia Screen: [] PASS [x] FAIL     Smoking [] YES [x] NO      Afib [x] YES [] NO     Stroke Education [x] YES [] NO    Obtain screening lower extremity venous ultrasound in patients who meet 1 or more of the following criteria as patient is high risk for DVT/PE on admission:   [] History of DVT/PE  []Hypercoagulable states (Factor V Leiden, Cancer, OCP, etc. )  []Prolonged immobility (hemiplegia/hemiparesis/post operative or any other extended immobilization)  [] Transferred from outside facility (Rehab or Long term care)  [] Age </= to 50

## 2023-09-02 NOTE — SWALLOW VFSS/MBS ASSESSMENT ADULT - LARYNGEAL PENETRATION DURING THE SWALLOW - SILENT
deep laryngeal penetration from materia over arytenoids with incomplete retrieval/Mild With cup only; pt noted with shallow laryngeal penetration from over arytenoids with complete retrieval./Trace

## 2023-09-02 NOTE — SWALLOW VFSS/MBS ASSESSMENT ADULT - ADDITIONAL RECOMMENDATIONS
Swallow: 1. Pt/family/caregiver will demonstrate understanding and carryover of dysphagia management (safe swallow guidelines, compensatory strategies, dysphagia diet).  2. Pt will complete dysphagia exercises to improve swallow function.  3. Pt will tolerate recommended diet with no overt, clinical s/s of aspiration.    Recommend repeat objective swallow testing for diet upgrade in 2-4 weeks after dysphagia therapy

## 2023-09-02 NOTE — PROGRESS NOTE ADULT - SUBJECTIVE AND OBJECTIVE BOX
Subjective: Patient seen and examined. No new events except as noted.   Daughter at bedside.  Remains in Stroke Unit.     REVIEW OF SYSTEMS:    CONSTITUTIONAL: + weakness, fevers or chills  EYES/ENT: No visual changes;  No vertigo or throat pain   NECK: No pain or stiffness  RESPIRATORY: No cough, wheezing, hemoptysis; No shortness of breath  CARDIOVASCULAR: No chest pain or palpitations  GASTROINTESTINAL: No abdominal or epigastric pain. No nausea, vomiting, or hematemesis; No diarrhea or constipation. No melena or hematochezia.  GENITOURINARY: No dysuria, frequency or hematuria  NEUROLOGICAL: No numbness or weakness  SKIN: No itching, burning, rashes, or lesions   All other review of systems is negative unless indicated above.    MEDICATIONS:  MEDICATIONS  (STANDING):  amLODIPine   Tablet 5 milliGRAM(s) Oral daily  aspirin  chewable 81 milliGRAM(s) Oral daily  atorvastatin 80 milliGRAM(s) Oral at bedtime  enoxaparin Injectable 40 milliGRAM(s) SubCutaneous <User Schedule>  psyllium Powder 1 Packet(s) Oral every 24 hours  senna 2 Tablet(s) Oral at bedtime    PHYSICAL EXAM:  T(C): 37.2 (09-02-23 @ 04:00), Max: 37.2 (09-02-23 @ 04:00)  HR: 70 (09-02-23 @ 06:00) (62 - 75)  BP: 160/88 (09-02-23 @ 06:00) (122/62 - 165/91)  RR: 24 (09-02-23 @ 06:00) (19 - 28)  SpO2: 96% (09-02-23 @ 06:00) (94% - 100%)  Wt(kg): --  I&O's Summary    01 Sep 2023 07:01  -  02 Sep 2023 07:00  --------------------------------------------------------  IN: 610 mL / OUT: 1100 mL / NET: -490 mL    Appearance: Normal	  HEENT:   Normal oral mucosa, PERRL, EOMI	  Lymphatic: No lymphadenopathy , no edema  Cardiovascular: Normal S1 S2, No JVD, No murmurs , Peripheral pulses palpable 2+ bilaterally  Respiratory: Lungs clear to auscultation, normal effort 	  Gastrointestinal:  Soft, Non-tender, + BS	  Skin: No rashes, No ecchymoses, No cyanosis, warm to touch  NEUROLOGICAL EXAM:  Mental status: Alert, Oriented to self, hospital, not year or age  Cranial Nerves: PERRL, intact BTT b/l, tracks examiner with eyes, No facial asymmetry, mild dysarthria  Motor exam: Normal tone, no drift in b/l UE and LE   Sensation: Intact to light touch   Coordination: Can touch examiner's fingers b/l  Gait: Deferred   Ext: No edema    LABS:    CARDIAC MARKERS:                        13.0   8.19  )-----------( 163      ( 02 Sep 2023 00:57 )             41.2     09-02    148<H>  |  110<H>  |  20  ----------------------------<  112<H>  3.7   |  28  |  0.83    Ca    8.8      02 Sep 2023 00:57  Phos  2.9     09-01  Mg     2.1     09-01    proBNP:   Lipid Profile:   HgA1c:   TSH:     TELEMETRY: Afib     ECG:  	  RADIOLOGY:   DIAGNOSTIC TESTING:  [ ] Echocardiogram:  [ ]  Catheterization:  [ ] Stress Test:    OTHER:

## 2023-09-02 NOTE — PROGRESS NOTE ADULT - SUBJECTIVE AND OBJECTIVE BOX
THE PATIENT WAS SEEN AND EXAMINED BY ME WITH THE HOUSESTAFF AND STROKE TEAM DURING MORNING ROUNDS.   HPI:  82M R-handed PMHx Afib (not on AC), HTN, and strokes (2021, 2022; residual deficit of dysarthria) presenting with sudden onset "dizziness." Pt was lying down in his bed getting ready to sleep when he noticed sudden onset of "room-spinning sensation." No prior h/o similar symptoms. No fever, chills, HA, nausea or vomiting. No focal numbness or weakness at the time of symptom onset. Symptoms continued to persist therefore, visited ED. Code stroke activated upon arrival. LKN: 0000 8/28/23. NIHSS: 1 (dysarthria). mRS: 1.     SUBJECTIVE: No events overnight.  No new neurologic complaints.  ROS reported negative unless otherwise noted.    acetaminophen     Tablet .. 650 milliGRAM(s) Oral every 6 hours PRN  amLODIPine   Tablet 5 milliGRAM(s) Oral daily  aspirin  chewable 81 milliGRAM(s) Oral daily  atorvastatin 80 milliGRAM(s) Oral at bedtime  enoxaparin Injectable 40 milliGRAM(s) SubCutaneous <User Schedule>  psyllium Powder 1 Packet(s) Oral every 24 hours  senna 2 Tablet(s) Oral at bedtime      PHYSICAL EXAM:   Vital Signs Last 24 Hrs  T(C): 37.2 (02 Sep 2023 04:00), Max: 37.2 (02 Sep 2023 04:00)  T(F): 99 (02 Sep 2023 04:00), Max: 99 (02 Sep 2023 04:00)  HR: 70 (02 Sep 2023 06:00) (62 - 75)  BP: 160/88 (02 Sep 2023 06:00) (122/62 - 171/87)  BP(mean): 109 (02 Sep 2023 06:00) (81 - 113)  RR: 24 (02 Sep 2023 06:00) (19 - 28)  SpO2: 96% (02 Sep 2023 06:00) (94% - 100%)    Parameters below as of 02 Sep 2023 06:00  Patient On (Oxygen Delivery Method): nasal cannula  O2 Flow (L/min): 4      General: No acute distress  HEENT: EOM intact, visual fields full  Abdomen: Soft, nontender, nondistended   Extremities: No edema      NEUROLOGICAL EXAM:  Mental status: Alert, Oriented to self, hospital, not year or age  Cranial Nerves: PERRL, intact BTT b/l, tracks examiner with eyes, No facial asymmetry, mild dysarthria  Motor exam: Normal tone, no drift in b/l UE and LE   Sensation: Intact to light touch   Coordination: Can touch examiner's fingers b/l  Gait: Deferred     LABS:                        13.0   8.19  )-----------( 163      ( 02 Sep 2023 00:57 )             41.2    09-02    148<H>  |  110<H>  |  20  ----------------------------<  112<H>  3.7   |  28  |  0.83    Ca    8.8      02 Sep 2023 00:57  Phos  2.9     09-01  Mg     2.1     09-01          IMAGING: Reviewed by me.     CT Head No Cont (08.30.23)  No significant interval change from 8/29/2023.  Similar-appearing acute right lateral cerebellar infarct with similar   hemorrhagic transformation.    CT Head No Cont (09.01.23   Similar-appearing hemorrhagic right cerebellar hemisphere infarction with   similar mass effect upon the fourth ventricle which is significantly   effaced.  Redemonstration of chronic left temporal infarct.  No hydrocephalus, midline shift, or effacement of the basal cisterns.  IMPRESSION:  No significant interval change from 8/31/2023.    MR Head No Cont (09.01.23  Reidentified is a large subacute infarct in the right cerebellum and   medial left cerebellum. Mild secondary mass effect upon the fourth   ventricle appears stable. Ventricular size is stable. No associated   hemorrhage. Reidentified is a chronic infarct in the left posterior MCA   territory.     MR Head No Cont (08.28.23   MRI brain: Acute infarct in the right anterior cerebellum with associated   internal hemorrhagic blood products.  No significant mass effect. There   are chronic infarct in the left cerebellum, as well as a large chronic   infarct of the left posterior MCA territory with old hemorrhagic blood   products.  MRA brain: No hemodynamically significant stenosis  MRA neck: Markedly limited by significant motion. No gross evidence of   occlusion.     THE PATIENT WAS SEEN AND EXAMINED BY ME WITH THE HOUSESTAFF AND STROKE TEAM DURING MORNING ROUNDS.   HPI:  82M R-handed PMHx Afib (not on AC), HTN, and strokes (2021, 2022; residual deficit of dysarthria) presenting with sudden onset "dizziness." Pt was lying down in his bed getting ready to sleep when he noticed sudden onset of "room-spinning sensation." No prior h/o similar symptoms. No fever, chills, HA, nausea or vomiting. No focal numbness or weakness at the time of symptom onset. Symptoms continued to persist therefore, visited ED. Code stroke activated upon arrival. LKN: 0000 8/28/23. NIHSS: 1 (dysarthria). mRS: 1.     SUBJECTIVE: No events overnight.  No new neurologic complaints.  ROS reported negative unless otherwise noted.    acetaminophen     Tablet .. 650 milliGRAM(s) Oral every 6 hours PRN  amLODIPine   Tablet 5 milliGRAM(s) Oral daily  aspirin  chewable 81 milliGRAM(s) Oral daily  atorvastatin 80 milliGRAM(s) Oral at bedtime  enoxaparin Injectable 40 milliGRAM(s) SubCutaneous <User Schedule>  psyllium Powder 1 Packet(s) Oral every 24 hours  senna 2 Tablet(s) Oral at bedtime      PHYSICAL EXAM:   Vital Signs Last 24 Hrs  T(C): 37.2 (02 Sep 2023 04:00), Max: 37.2 (02 Sep 2023 04:00)  T(F): 99 (02 Sep 2023 04:00), Max: 99 (02 Sep 2023 04:00)  HR: 70 (02 Sep 2023 06:00) (62 - 75)  BP: 160/88 (02 Sep 2023 06:00) (122/62 - 171/87)  BP(mean): 109 (02 Sep 2023 06:00) (81 - 113)  RR: 24 (02 Sep 2023 06:00) (19 - 28)  SpO2: 96% (02 Sep 2023 06:00) (94% - 100%)    Parameters below as of 02 Sep 2023 06:00  Patient On (Oxygen Delivery Method): nasal cannula  O2 Flow (L/min): 4      General: No acute distress  HEENT: EOM intact, visual fields full  Abdomen: Soft, nontender, nondistended   Extremities: No edema      NEUROLOGICAL EXAM:  Mental status: Alert, Oriented to self, hospital, not year or age  Cranial Nerves: PERRL, intact BTT b/l, tracks examiner with eyes, No facial asymmetry, mild dysarthria  Motor exam: Normal tone, no drift in b/l UE and LE   Sensation: Intact to light touch   Coordination: R side dysmetria   Gait: Deferred     LABS:                        13.0   8.19  )-----------( 163      ( 02 Sep 2023 00:57 )             41.2    09-02    148<H>  |  110<H>  |  20  ----------------------------<  112<H>  3.7   |  28  |  0.83    Ca    8.8      02 Sep 2023 00:57  Phos  2.9     09-01  Mg     2.1     09-01          IMAGING: Reviewed by me.     CT Head No Cont (08.30.23)  No significant interval change from 8/29/2023.  Similar-appearing acute right lateral cerebellar infarct with similar   hemorrhagic transformation.    CT Head No Cont (09.01.23   Similar-appearing hemorrhagic right cerebellar hemisphere infarction with   similar mass effect upon the fourth ventricle which is significantly   effaced.  Redemonstration of chronic left temporal infarct.  No hydrocephalus, midline shift, or effacement of the basal cisterns.  IMPRESSION:  No significant interval change from 8/31/2023.    MR Head No Cont (09.01.23  Reidentified is a large subacute infarct in the right cerebellum and   medial left cerebellum. Mild secondary mass effect upon the fourth   ventricle appears stable. Ventricular size is stable. No associated   hemorrhage. Reidentified is a chronic infarct in the left posterior MCA   territory.     MR Head No Cont (08.28.23   MRI brain: Acute infarct in the right anterior cerebellum with associated   internal hemorrhagic blood products.  No significant mass effect. There   are chronic infarct in the left cerebellum, as well as a large chronic   infarct of the left posterior MCA territory with old hemorrhagic blood   products.  MRA brain: No hemodynamically significant stenosis  MRA neck: Markedly limited by significant motion. No gross evidence of   occlusion.

## 2023-09-02 NOTE — SWALLOW VFSS/MBS ASSESSMENT ADULT - ORAL PHASE COMMENTS
min oral cavity residue; volitional repeat swallow to clear to trace tsp: min oral residue. cup: mod oral residue. With volitional repeat swallows, pt reduced to minimum premature spillage up to max in valleculae and max in pyriform sinuses  min oral cavity residue   Deep laryngeal penetration noted before the swallow over laryngeal surface of the epiglottis with incomplete retrieval With tsp: premature spillage up to max in valleculae and min along aryepiglottic folds  With cup: premature spillage up to max in valleculae and max in pyriform sinuses   min-mod oral cavity residue which is reduced to trace-min with repeat volitional swallow passive spillage up to max in valleculae   min oral cavity residue, with repeat swallows, pt clears residue trace

## 2023-09-02 NOTE — PROGRESS NOTE ADULT - NS ATTEND AMEND GEN_ALL_CORE FT
I saw and examined the patient, reviewed diagnostic studies, and reviewed images personally. I agree with PA’s history, exam, orders placed, and plan of care. Medical issues needing to be addressed include: Cerebellar infarction w/ edema, prior stroke w/ residual dysarthria, Afib not on AC (pt stopped as he was worried about bleeding), dizziness. Stroke likely 2/2 cardioembolism in setting of AFib and medication noncompliance. Consider Eliquis (if not covered may need lovenox-coumadin) in a few days if exam stable and CTH stable. I saw and examined the patient, reviewed diagnostic studies, and reviewed images personally. I agree with PA’s history, exam, orders placed, and plan of care. Medical issues needing to be addressed include: Cerebellar infarction w/ edema, prior stroke w/ residual dysarthria, Afib not on AC (pt stopped as he was worried about bleeding), dizziness. Stroke likely 2/2 cardioembolism in setting of AFib and medication noncompliance. 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 Most recent MRI stable - start heparin gtt 40-60 now, if  exam stable and CTH stable transition to Eliqu.

## 2023-09-02 NOTE — SWALLOW VFSS/MBS ASSESSMENT ADULT - ORAL PREP COMMENTS
Pt gagged over trial of soft-bite sized and self-expectorated trials. Pt reported that soft-bite-sized was too large. Trials discontinued.

## 2023-09-02 NOTE — SWALLOW VFSS/MBS ASSESSMENT ADULT - SLP PERTINENT HISTORY OF CURRENT PROBLEM
82M R-handed PMHx Afib (not on AC), HTN, and strokes (2021, 2022; residual deficit of dysarthria) presenting with sudden onset "dizziness." Pt was lying down in his bed getting ready to sleep when he noticed sudden onset of "room-spinning sensation." No prior h/o similar symptoms. No fever, chills, HA, nausea or vomiting. No focal numbness or weakness at the time of symptom onset. Symptoms continued to persist therefore, visited ED. Code stroke activated upon arrival. LKN: 0000 8/28/23. NIHSS: 1 (dysarthria). mRS: 1. Pt found w/ Acute infarct in the right anterior cerebellum with associated blood product.

## 2023-09-02 NOTE — SWALLOW VFSS/MBS ASSESSMENT ADULT - DELAYED INITIATION OF THE PHARYNGEAL SWALLOW (IN SECONDS)
Pharyngeal swallow triggered at pyriform sinuses Pharyngeal swallow triggered at valleculae Pharyngeal swallow triggered up to pyriform sinuses

## 2023-09-02 NOTE — SWALLOW VFSS/MBS ASSESSMENT ADULT - ESOPHAGEAL STAGE
trace retention at UES trace retrograde flow up to C5-6  trace retention in proximal esophagus trace retrograde flow up to C5-6

## 2023-09-02 NOTE — SWALLOW VFSS/MBS ASSESSMENT ADULT - ADDITIONAL INFORMATION
+calcifications of laryngeal structures  +non-obstructive anterior cervical osteophytes at C2-3, 3-4, 4-5

## 2023-09-02 NOTE — PROGRESS NOTE ADULT - ASSESSMENT
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

## 2023-09-02 NOTE — SWALLOW VFSS/MBS ASSESSMENT ADULT - ROSENBEK'S PENETRATION ASPIRATION SCALE
(3) contrast remains above the vocal cords, visible residue remains (penetration) (1) no aspiration, contrast does not enter airway N/A (2) contrast enters airway, remains above the vocal cords, no residue remains (penetration)

## 2023-09-02 NOTE — SWALLOW VFSS/MBS ASSESSMENT ADULT - DIAGNOSTIC IMPRESSIONS
Pt is 83yo Mandarin speaking M with pertinent pmhx of multiple CVA, s/p code stroke, w/ R cerebellar infarct with hemorrhagic transformation, s/p NSCU stay. Pt p/w oropharyngeal dysphagia, likely acute-on-chronic given acute CVA and prior CVA. Oral Phase: Functional mastication for minced-moist. mildly prolonged oral transit time. Pt gagged and expectorated soft-bite-sized soilds; advanced chewables not recommended. Min-moderate oral cavity residue reduced to trace-min with repeat volitional swallows. Pharyngeal Phase: Delayed initiation of swallow and reduced laryngeal vestibule closure resulted in deep laryngeal penetration of thin liquids w/o retrieval. Also w/ shallow laryngeal penetration with cup sip of mildly thick liquids, which was eliminated with use of tsp. Esophageal phase: trace intermittent retention in proximal esophagus across trials. Intermittent trace retrograde flow in proximal esophagus with thin liquids and moderately thick liquids. Pt is an good candidate for rehabilitative swallow exercises.     Disorders. Pt with reduced: tongue-palate seal, delay in trigger of the swallow reflex, reduced hyo-laryngeal anterior excursion, reduced laryngeal closure, reduced pharyngeal contractility, reduced supraglottic sensation

## 2023-09-03 LAB
ANION GAP SERPL CALC-SCNC: 12 MMOL/L — SIGNIFICANT CHANGE UP (ref 5–17)
APTT BLD: 28.8 SEC — SIGNIFICANT CHANGE UP (ref 24.5–35.6)
APTT BLD: 30.2 SEC — SIGNIFICANT CHANGE UP (ref 24.5–35.6)
APTT BLD: 33.4 SEC — SIGNIFICANT CHANGE UP (ref 24.5–35.6)
BUN SERPL-MCNC: 15 MG/DL — SIGNIFICANT CHANGE UP (ref 7–23)
CALCIUM SERPL-MCNC: 8.9 MG/DL — SIGNIFICANT CHANGE UP (ref 8.4–10.5)
CHLORIDE SERPL-SCNC: 106 MMOL/L — SIGNIFICANT CHANGE UP (ref 96–108)
CO2 SERPL-SCNC: 25 MMOL/L — SIGNIFICANT CHANGE UP (ref 22–31)
CREAT SERPL-MCNC: 0.81 MG/DL — SIGNIFICANT CHANGE UP (ref 0.5–1.3)
EGFR: 89 ML/MIN/1.73M2 — SIGNIFICANT CHANGE UP
GLUCOSE SERPL-MCNC: 131 MG/DL — HIGH (ref 70–99)
HCT VFR BLD CALC: 40 % — SIGNIFICANT CHANGE UP (ref 39–50)
HGB BLD-MCNC: 13 G/DL — SIGNIFICANT CHANGE UP (ref 13–17)
MCHC RBC-ENTMCNC: 31.8 PG — SIGNIFICANT CHANGE UP (ref 27–34)
MCHC RBC-ENTMCNC: 32.5 GM/DL — SIGNIFICANT CHANGE UP (ref 32–36)
MCV RBC AUTO: 97.8 FL — SIGNIFICANT CHANGE UP (ref 80–100)
NRBC # BLD: 0 /100 WBCS — SIGNIFICANT CHANGE UP (ref 0–0)
PLATELET # BLD AUTO: 164 K/UL — SIGNIFICANT CHANGE UP (ref 150–400)
POTASSIUM SERPL-MCNC: 3.3 MMOL/L — LOW (ref 3.5–5.3)
POTASSIUM SERPL-SCNC: 3.3 MMOL/L — LOW (ref 3.5–5.3)
RBC # BLD: 4.09 M/UL — LOW (ref 4.2–5.8)
RBC # FLD: 12.2 % — SIGNIFICANT CHANGE UP (ref 10.3–14.5)
SODIUM SERPL-SCNC: 143 MMOL/L — SIGNIFICANT CHANGE UP (ref 135–145)
WBC # BLD: 6.81 K/UL — SIGNIFICANT CHANGE UP (ref 3.8–10.5)
WBC # FLD AUTO: 6.81 K/UL — SIGNIFICANT CHANGE UP (ref 3.8–10.5)

## 2023-09-03 PROCEDURE — 99233 SBSQ HOSP IP/OBS HIGH 50: CPT

## 2023-09-03 RX ORDER — HEPARIN SODIUM 5000 [USP'U]/ML
1300 INJECTION INTRAVENOUS; SUBCUTANEOUS
Qty: 25000 | Refills: 0 | Status: DISCONTINUED | OUTPATIENT
Start: 2023-09-03 | End: 2023-09-04

## 2023-09-03 RX ORDER — LOSARTAN POTASSIUM 100 MG/1
25 TABLET, FILM COATED ORAL DAILY
Refills: 0 | Status: DISCONTINUED | OUTPATIENT
Start: 2023-09-03 | End: 2023-09-04

## 2023-09-03 RX ORDER — POTASSIUM CHLORIDE 20 MEQ
10 PACKET (EA) ORAL
Refills: 0 | Status: DISCONTINUED | OUTPATIENT
Start: 2023-09-03 | End: 2023-09-03

## 2023-09-03 RX ORDER — FUROSEMIDE 40 MG
20 TABLET ORAL ONCE
Refills: 0 | Status: COMPLETED | OUTPATIENT
Start: 2023-09-03 | End: 2023-09-03

## 2023-09-03 RX ORDER — HYDRALAZINE HCL 50 MG
5 TABLET ORAL ONCE
Refills: 0 | Status: COMPLETED | OUTPATIENT
Start: 2023-09-03 | End: 2023-09-03

## 2023-09-03 RX ORDER — AMLODIPINE BESYLATE 2.5 MG/1
5 TABLET ORAL DAILY
Refills: 0 | Status: DISCONTINUED | OUTPATIENT
Start: 2023-09-03 | End: 2023-09-06

## 2023-09-03 RX ORDER — POTASSIUM CHLORIDE 20 MEQ
40 PACKET (EA) ORAL ONCE
Refills: 0 | Status: COMPLETED | OUTPATIENT
Start: 2023-09-03 | End: 2023-09-03

## 2023-09-03 RX ORDER — AMLODIPINE BESYLATE 2.5 MG/1
10 TABLET ORAL DAILY
Refills: 0 | Status: DISCONTINUED | OUTPATIENT
Start: 2023-09-03 | End: 2023-09-03

## 2023-09-03 RX ORDER — FUROSEMIDE 40 MG
20 TABLET ORAL DAILY
Refills: 0 | Status: DISCONTINUED | OUTPATIENT
Start: 2023-09-03 | End: 2023-09-06

## 2023-09-03 RX ADMIN — Medication 3 MILLILITER(S): at 18:04

## 2023-09-03 RX ADMIN — Medication 40 MILLIEQUIVALENT(S): at 07:54

## 2023-09-03 RX ADMIN — Medication 3 MILLILITER(S): at 05:11

## 2023-09-03 RX ADMIN — Medication 3 MILLILITER(S): at 23:09

## 2023-09-03 RX ADMIN — HEPARIN SODIUM 12 UNIT(S)/HR: 5000 INJECTION INTRAVENOUS; SUBCUTANEOUS at 06:29

## 2023-09-03 RX ADMIN — Medication 3 MILLILITER(S): at 12:53

## 2023-09-03 RX ADMIN — HEPARIN SODIUM 14.5 UNIT(S)/HR: 5000 INJECTION INTRAVENOUS; SUBCUTANEOUS at 20:15

## 2023-09-03 RX ADMIN — Medication 20 MILLIGRAM(S): at 09:02

## 2023-09-03 RX ADMIN — ATORVASTATIN CALCIUM 80 MILLIGRAM(S): 80 TABLET, FILM COATED ORAL at 21:28

## 2023-09-03 RX ADMIN — Medication 5 MILLIGRAM(S): at 14:20

## 2023-09-03 RX ADMIN — HEPARIN SODIUM 13 UNIT(S)/HR: 5000 INJECTION INTRAVENOUS; SUBCUTANEOUS at 13:50

## 2023-09-03 RX ADMIN — HEPARIN SODIUM 11 UNIT(S)/HR: 5000 INJECTION INTRAVENOUS; SUBCUTANEOUS at 22:15

## 2023-09-03 RX ADMIN — AMLODIPINE BESYLATE 5 MILLIGRAM(S): 2.5 TABLET ORAL at 05:11

## 2023-09-03 RX ADMIN — LOSARTAN POTASSIUM 25 MILLIGRAM(S): 100 TABLET, FILM COATED ORAL at 18:19

## 2023-09-03 NOTE — PROGRESS NOTE ADULT - NS ATTEND AMEND GEN_ALL_CORE FT
I saw and examined the patient, reviewed diagnostic studies, and reviewed images personally. I agree with PA’s history, exam, orders placed, and plan of care. Medical issues needing to be addressed include: Cerebellar infarction w/ edema, prior stroke w/ residual dysarthria, Afib not on AC (pt stopped as he was worried about bleeding), dizziness. Stroke likely 2/2 cardioembolism in setting of AFib and medication noncompliance. 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 Most recent MRI stable. heparin gtt 40-60 CTH tomorrow AM. Pt doing well.

## 2023-09-03 NOTE — PROGRESS NOTE ADULT - SUBJECTIVE AND OBJECTIVE BOX
THE PATIENT WAS SEEN AND EXAMINED BY ME WITH THE HOUSESTAFF AND STROKE TEAM DURING MORNING ROUNDS.   HPI:  82M R-handed PMHx Afib (not on AC), HTN, and strokes (2021, 2022; residual deficit of dysarthria) presenting with sudden onset "dizziness." Pt was lying down in his bed getting ready to sleep when he noticed sudden onset of "room-spinning sensation." No prior h/o similar symptoms. No fever, chills, HA, nausea or vomiting. No focal numbness or weakness at the time of symptom onset. Symptoms continued to persist therefore, visited ED. Code stroke activated upon arrival. LKN: 0000 8/28/23. NIHSS: 1 (dysarthria). mRS: 1.       SUBJECTIVE: No events overnight.  No new neurologic complaints.  ROS reported negative unless otherwise noted.    acetaminophen     Tablet .. 650 milliGRAM(s) Oral every 6 hours PRN  albuterol/ipratropium for Nebulization 3 milliLiter(s) Nebulizer every 6 hours  amLODIPine   Tablet 5 milliGRAM(s) Oral daily  atorvastatin 80 milliGRAM(s) Oral at bedtime  heparin  Infusion 1000 Unit(s)/Hr IV Continuous <Continuous>  psyllium Powder 1 Packet(s) Oral every 24 hours  senna 2 Tablet(s) Oral at bedtime      PHYSICAL EXAM:   Vital Signs Last 24 Hrs  T(C): 37 (03 Sep 2023 04:00), Max: 37.2 (02 Sep 2023 16:00)  T(F): 98.6 (03 Sep 2023 04:00), Max: 98.9 (02 Sep 2023 16:00)  HR: 81 (03 Sep 2023 08:00) (56 - 84)  BP: 146/74 (03 Sep 2023 08:00) (131/98 - 169/86)  BP(mean): 95 (03 Sep 2023 08:00) (88 - 119)  RR: 20 (03 Sep 2023 08:00) (18 - 29)  SpO2: 92% (03 Sep 2023 08:00) (92% - 100%)    Parameters below as of 03 Sep 2023 08:00  Patient On (Oxygen Delivery Method): nasal cannula  O2 Flow (L/min): 4      General: No acute distress  HEENT: EOM intact, visual fields full  Abdomen: Soft, nontender, nondistended   Extremities: No edema    NEUROLOGICAL EXAM:  Mental status: Alert, Oriented to self, hospital, not year or age  Cranial Nerves: PERRL, intact BTT b/l, tracks examiner with eyes, No facial asymmetry, mild dysarthria  Motor exam: Normal tone, no drift in b/l UE and LE   Sensation: Intact to light touch   Coordination: R side dysmetria   Gait: Deferred     LABS:                        13.0   6.81  )-----------( 164      ( 03 Sep 2023 05:31 )             40.0    09-03    143  |  106  |  15  ----------------------------<  131<H>  3.3<L>   |  25  |  0.81    Ca    8.9      03 Sep 2023 05:31    PTT - ( 03 Sep 2023 05:31 )  PTT:28.8 sec      IMAGING: Reviewed by me.       CT Head No Cont (08.30.23)  No significant interval change from 8/29/2023.  Similar-appearing acute right lateral cerebellar infarct with similar   hemorrhagic transformation.    CT Head No Cont (09.01.23   Similar-appearing hemorrhagic right cerebellar hemisphere infarction with   similar mass effect upon the fourth ventricle which is significantly   effaced.  Redemonstration of chronic left temporal infarct.  No hydrocephalus, midline shift, or effacement of the basal cisterns.  IMPRESSION:  No significant interval change from 8/31/2023.    MR Head No Cont (09.01.23  Reidentified is a large subacute infarct in the right cerebellum and   medial left cerebellum. Mild secondary mass effect upon the fourth   ventricle appears stable. Ventricular size is stable. No associated   hemorrhage. Reidentified is a chronic infarct in the left posterior MCA   territory.     MR Head No Cont (08.28.23   MRI brain: Acute infarct in the right anterior cerebellum with associated   internal hemorrhagic blood products.  No significant mass effect. There   are chronic infarct in the left cerebellum, as well as a large chronic   infarct of the left posterior MCA territory with old hemorrhagic blood   products.  MRA brain: No hemodynamically significant stenosis  MRA neck: Markedly limited by significant motion. No gross evidence of   occlusion.

## 2023-09-03 NOTE — PROGRESS NOTE ADULT - ASSESSMENT
82M R-handed PMHx Afib (not on AC), HTN, and strokes (2021, 2022; residual deficit of dysarthria) presenting with sudden onset "dizziness." No prior h/o similar symptoms. Vitals notable for systolic >180s. CBC and CMP WNL. CTH showing chronic left parietotemporal infarct but no acute findings. CTA neck w/o flow-limiting stenosis but CTA head demonstrating high-grade narrowing of proximal basilar artery with patency of distal basilar artery and its branches. Pt not a candidate for tenecteplase due to non-disabling symptoms and not a thrombectomy candidate due to no LVO seen on CTA H/N. Course c/b hemorrhagic transformation of R cerebellar infarct with mass effect, after which pt transferred to NSCU for monitoring.    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 Mechanism of stroke potentially cardioembolic due to Afib.     NEURO: Patient remains neurologically stable since transfer back to stroke unit on 9/1. Previously pt's mental status had decreased on 8/29 and given R cerebellar infarct with mass effect and hemorrhagic conversion, Eliquis held and pt transferred to NSCU for closer monitoring.  MRI with results as above, repeat MRI w/o contrast to evaluate potential evolution of underlying ischemic stroke and hemorrhagic transformation. Now on heparin gtt and will start eliquis 9/4 after repeat stable CTH. Goal for normotension. Physical therapy/OT recc AR.    ANTITHROMBOTIC THERAPY: Anticoagulation held. For now, c/w aspirin 81qd for secondary stroke prevention     PULMONARY: protecting airway, saturating well on 4L NC. CXR with pulmonary edema, can trial lasix. C/w douneb q 6 hr. Will consider pulm consult. Pending pro BNP for AM.     CARDIOVASCULAR: TTE EF 55-60%, continue cardiac monitoring. Has hx of afib, not on AC. Recommend watchman device with cardiology once neurologically stable                  SBP goal: < 140    GASTROINTESTINAL: Swallow eval recommending pureed. S/p MBS with diet upgrade on 9/2      Diet: minced and moist with mildly thick liquids, fed by teaspoon only    RENAL: BUN/Cr stable, good urine output. Now off hypertonic saline      Na Goal: 145-150     Ying: No    HEMATOLOGY: H/H stable, Platelets 163     DVT ppx: Lovenox    ID: Afebrile, resolved leukocytosis     OTHER: Plan discussed with patient and family at beside.     DISPOSITION: acute Rehab once stable and workup is complete. Patient pending medicaid insurance     CORE MEASURES:        Admission NIHSS: 1     TPA: [] YES [x] NO      LDL/HDL: 51/51     Depression Screen: no      Statin Therapy: yes     Dysphagia Screen: [] PASS [x] FAIL     Smoking [] YES [x] NO      Afib [x] YES [] NO     Stroke Education [x] YES [] NO    Obtain screening lower extremity venous ultrasound in patients who meet 1 or more of the following criteria as patient is high risk for DVT/PE on admission:   [] History of DVT/PE  []Hypercoagulable states (Factor V Leiden, Cancer, OCP, etc. )  []Prolonged immobility (hemiplegia/hemiparesis/post operative or any other extended immobilization)  [] Transferred from outside facility (Rehab or Long term care)  [] Age </= to 50     82M R-handed PMHx Afib (not on AC), HTN, and strokes (2021, 2022; residual deficit of dysarthria) presenting with sudden onset "dizziness." No prior h/o similar symptoms. Vitals notable for systolic >180s. CBC and CMP WNL. CTH showing chronic left parietotemporal infarct but no acute findings. CTA neck w/o flow-limiting stenosis but CTA head demonstrating high-grade narrowing of proximal basilar artery with patency of distal basilar artery and its branches. Pt not a candidate for tenecteplase due to non-disabling symptoms and not a thrombectomy candidate due to no LVO seen on CTA H/N. Course c/b hemorrhagic transformation of R cerebellar infarct with mass effect, after which pt transferred to NSCU for monitoring.    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 Mechanism of stroke potentially cardioembolic due to Afib.     NEURO: Patient remains neurologically stable since transfer back to stroke unit on 9/1. Previously pt's mental status had decreased on 8/29 and given R cerebellar infarct with mass effect and hemorrhagic conversion, Eliquis held and pt transferred to NSCU for closer monitoring.  MRI with results as above, repeat MRI w/o contrast to evaluate potential evolution of underlying ischemic stroke and hemorrhagic transformation. Now on heparin gtt and will start eliquis 9/4 after repeat stable CTH. Goal for normotension. Physical therapy/OT recc AR.    ANTITHROMBOTIC THERAPY: Anticoagulation held. For now, c/w aspirin 81qd for secondary stroke prevention     PULMONARY: protecting airway, saturating well on 4L NC. CXR with pulmonary edema, can trial lasix. C/w douneb q 6 hr. Will consider pulm consult. Pending pro BNP for AM.     CARDIOVASCULAR: TTE EF 55-60%, continue cardiac monitoring. Has hx of afib, not on AC. Recommend watchman device with cardiology once neurologically stable                  SBP goal: < 140    GASTROINTESTINAL: Swallow eval recommending pureed. S/p MBS with diet upgrade on 9/2      Diet: minced and moist with mildly thick liquids, fed by teaspoon only    RENAL: BUN/Cr stable, good urine output. Now off hypertonic saline      Na Goal: 145-150     Ying: No    HEMATOLOGY: H/H stable, Platelets 163     DVT ppx: heparin gtt     ID: Afebrile, resolved leukocytosis     OTHER: Plan discussed with patient and family at beside.     DISPOSITION: acute Rehab once stable and workup is complete. Patient pending medicaid insurance     CORE MEASURES:        Admission NIHSS: 1     TPA: [] YES [x] NO      LDL/HDL: 51/51     Depression Screen: no      Statin Therapy: yes     Dysphagia Screen: [] PASS [x] FAIL     Smoking [] YES [x] NO      Afib [x] YES [] NO     Stroke Education [x] YES [] NO    Obtain screening lower extremity venous ultrasound in patients who meet 1 or more of the following criteria as patient is high risk for DVT/PE on admission:   [] History of DVT/PE  []Hypercoagulable states (Factor V Leiden, Cancer, OCP, etc. )  []Prolonged immobility (hemiplegia/hemiparesis/post operative or any other extended immobilization)  [] Transferred from outside facility (Rehab or Long term care)  [] Age </= to 50

## 2023-09-03 NOTE — PROGRESS NOTE ADULT - SUBJECTIVE AND OBJECTIVE BOX
Subjective: Patient seen and examined. No new events except as noted.   remains in Stroke unit       REVIEW OF SYSTEMS:    CONSTITUTIONAL: +weakness, fevers or chills  EYES/ENT: No visual changes;  No vertigo or throat pain   NECK: No pain or stiffness  RESPIRATORY: No cough, wheezing, hemoptysis; No shortness of breath  CARDIOVASCULAR: No chest pain or palpitations  GASTROINTESTINAL: No abdominal or epigastric pain. No nausea, vomiting, or hematemesis; No diarrhea or constipation. No melena or hematochezia.  GENITOURINARY: No dysuria, frequency or hematuria  NEUROLOGICAL: No numbness or weakness  SKIN: No itching, burning, rashes, or lesions   All other review of systems is negative unless indicated above.    MEDICATIONS:  MEDICATIONS  (STANDING):  albuterol/ipratropium for Nebulization 3 milliLiter(s) Nebulizer every 6 hours  amLODIPine   Tablet 5 milliGRAM(s) Oral daily  atorvastatin 80 milliGRAM(s) Oral at bedtime  heparin  Infusion 1000 Unit(s)/Hr (12 mL/Hr) IV Continuous <Continuous>  psyllium Powder 1 Packet(s) Oral every 24 hours  senna 2 Tablet(s) Oral at bedtime      PHYSICAL EXAM:  T(C): 37 (09-03-23 @ 04:00), Max: 37.2 (09-02-23 @ 16:00)  HR: 81 (09-03-23 @ 08:00) (56 - 84)  BP: 146/74 (09-03-23 @ 08:00) (131/98 - 169/86)  RR: 20 (09-03-23 @ 08:00) (18 - 29)  SpO2: 92% (09-03-23 @ 08:00) (92% - 100%)  Wt(kg): --  I&O's Summary    02 Sep 2023 07:01  -  03 Sep 2023 07:00  --------------------------------------------------------  IN: 349 mL / OUT: 800 mL / NET: -451 mL            Appearance: Normal	  HEENT:   Normal oral mucosa, PERRL, EOMI	  Lymphatic: No lymphadenopathy , no edema  Cardiovascular: Normal S1 S2, No JVD, No murmurs , Peripheral pulses palpable 2+ bilaterally  Respiratory: Lungs clear to auscultation, normal effort 	  Gastrointestinal:  Soft, Non-tender, + BS	  Skin: No rashes, No ecchymoses, No cyanosis, warm to touch  NEUROLOGICAL EXAM:  Mental status: Alert, Oriented to self, hospital, not year or age  Cranial Nerves: PERRL, intact BTT b/l, tracks examiner with eyes, No facial asymmetry, mild dysarthria  Motor exam: Normal tone, no drift in b/l UE and LE   Sensation: Intact to light touch   Coordination: Can touch examiner's fingers b/l  Gait: Deferred   Ext: No edema      LABS:    CARDIAC MARKERS:                                13.0   6.81  )-----------( 164      ( 03 Sep 2023 05:31 )             40.0     09-03    143  |  106  |  15  ----------------------------<  131<H>  3.3<L>   |  25  |  0.81    Ca    8.9      03 Sep 2023 05:31      proBNP:   Lipid Profile:   HgA1c:   TSH:             TELEMETRY: Afib  	    ECG:  	  RADIOLOGY:   DIAGNOSTIC TESTING:  [ ] Echocardiogram:  [ ]  Catheterization:  [ ] Stress Test:    OTHER:

## 2023-09-04 LAB
ANION GAP SERPL CALC-SCNC: 12 MMOL/L — SIGNIFICANT CHANGE UP (ref 5–17)
APTT BLD: 35.4 SEC — SIGNIFICANT CHANGE UP (ref 24.5–35.6)
BUN SERPL-MCNC: 11 MG/DL — SIGNIFICANT CHANGE UP (ref 7–23)
CALCIUM SERPL-MCNC: 8.8 MG/DL — SIGNIFICANT CHANGE UP (ref 8.4–10.5)
CHLORIDE SERPL-SCNC: 101 MMOL/L — SIGNIFICANT CHANGE UP (ref 96–108)
CO2 SERPL-SCNC: 27 MMOL/L — SIGNIFICANT CHANGE UP (ref 22–31)
CREAT SERPL-MCNC: 0.8 MG/DL — SIGNIFICANT CHANGE UP (ref 0.5–1.3)
EGFR: 89 ML/MIN/1.73M2 — SIGNIFICANT CHANGE UP
GLUCOSE SERPL-MCNC: 119 MG/DL — HIGH (ref 70–99)
HCT VFR BLD CALC: 41.3 % — SIGNIFICANT CHANGE UP (ref 39–50)
HGB BLD-MCNC: 13.8 G/DL — SIGNIFICANT CHANGE UP (ref 13–17)
MCHC RBC-ENTMCNC: 31.9 PG — SIGNIFICANT CHANGE UP (ref 27–34)
MCHC RBC-ENTMCNC: 33.4 GM/DL — SIGNIFICANT CHANGE UP (ref 32–36)
MCV RBC AUTO: 95.4 FL — SIGNIFICANT CHANGE UP (ref 80–100)
NRBC # BLD: 0 /100 WBCS — SIGNIFICANT CHANGE UP (ref 0–0)
NT-PROBNP SERPL-SCNC: 3938 PG/ML — HIGH (ref 0–300)
PLATELET # BLD AUTO: 176 K/UL — SIGNIFICANT CHANGE UP (ref 150–400)
POTASSIUM SERPL-MCNC: 3.2 MMOL/L — LOW (ref 3.5–5.3)
POTASSIUM SERPL-SCNC: 3.2 MMOL/L — LOW (ref 3.5–5.3)
RBC # BLD: 4.33 M/UL — SIGNIFICANT CHANGE UP (ref 4.2–5.8)
RBC # FLD: 12.1 % — SIGNIFICANT CHANGE UP (ref 10.3–14.5)
SODIUM SERPL-SCNC: 140 MMOL/L — SIGNIFICANT CHANGE UP (ref 135–145)
WBC # BLD: 6.16 K/UL — SIGNIFICANT CHANGE UP (ref 3.8–10.5)
WBC # FLD AUTO: 6.16 K/UL — SIGNIFICANT CHANGE UP (ref 3.8–10.5)

## 2023-09-04 PROCEDURE — 99233 SBSQ HOSP IP/OBS HIGH 50: CPT

## 2023-09-04 PROCEDURE — 70450 CT HEAD/BRAIN W/O DYE: CPT | Mod: 26

## 2023-09-04 RX ORDER — POTASSIUM CHLORIDE 20 MEQ
40 PACKET (EA) ORAL ONCE
Refills: 0 | Status: COMPLETED | OUTPATIENT
Start: 2023-09-04 | End: 2023-09-04

## 2023-09-04 RX ORDER — MECLIZINE HCL 12.5 MG
25 TABLET ORAL DAILY
Refills: 0 | Status: DISCONTINUED | OUTPATIENT
Start: 2023-09-04 | End: 2023-09-06

## 2023-09-04 RX ORDER — APIXABAN 2.5 MG/1
5 TABLET, FILM COATED ORAL
Refills: 0 | Status: DISCONTINUED | OUTPATIENT
Start: 2023-09-04 | End: 2023-09-06

## 2023-09-04 RX ORDER — LOSARTAN POTASSIUM 100 MG/1
50 TABLET, FILM COATED ORAL DAILY
Refills: 0 | Status: DISCONTINUED | OUTPATIENT
Start: 2023-09-04 | End: 2023-09-06

## 2023-09-04 RX ORDER — POTASSIUM CHLORIDE 20 MEQ
40 PACKET (EA) ORAL ONCE
Refills: 0 | Status: DISCONTINUED | OUTPATIENT
Start: 2023-09-04 | End: 2023-09-04

## 2023-09-04 RX ADMIN — Medication 25 MILLIGRAM(S): at 18:29

## 2023-09-04 RX ADMIN — HEPARIN SODIUM 15.5 UNIT(S)/HR: 5000 INJECTION INTRAVENOUS; SUBCUTANEOUS at 02:54

## 2023-09-04 RX ADMIN — Medication 3 MILLILITER(S): at 17:05

## 2023-09-04 RX ADMIN — AMLODIPINE BESYLATE 5 MILLIGRAM(S): 2.5 TABLET ORAL at 05:41

## 2023-09-04 RX ADMIN — ATORVASTATIN CALCIUM 80 MILLIGRAM(S): 80 TABLET, FILM COATED ORAL at 22:36

## 2023-09-04 RX ADMIN — APIXABAN 5 MILLIGRAM(S): 2.5 TABLET, FILM COATED ORAL at 11:01

## 2023-09-04 RX ADMIN — Medication 3 MILLILITER(S): at 05:41

## 2023-09-04 RX ADMIN — APIXABAN 5 MILLIGRAM(S): 2.5 TABLET, FILM COATED ORAL at 17:05

## 2023-09-04 RX ADMIN — Medication 40 MILLIEQUIVALENT(S): at 05:41

## 2023-09-04 RX ADMIN — LOSARTAN POTASSIUM 25 MILLIGRAM(S): 100 TABLET, FILM COATED ORAL at 05:41

## 2023-09-04 RX ADMIN — Medication 100 MILLIGRAM(S): at 22:37

## 2023-09-04 RX ADMIN — Medication 20 MILLIGRAM(S): at 05:41

## 2023-09-04 RX ADMIN — Medication 3 MILLILITER(S): at 11:01

## 2023-09-04 NOTE — PROGRESS NOTE ADULT - ASSESSMENT
82M R-handed PMHx Afib (not on AC), HTN, and strokes (2021, 2022; residual deficit of dysarthria) presenting with sudden onset "dizziness." No prior h/o similar symptoms. Vitals notable for systolic >180s. CBC and CMP WNL. CTH showing chronic left parietotemporal infarct but no acute findings. CTA neck w/o flow-limiting stenosis but CTA head demonstrating high-grade narrowing of proximal basilar artery with patency of distal basilar artery and its branches. Pt not a candidate for tenecteplase due to non-disabling symptoms and not a thrombectomy candidate due to no LVO seen on CTA H/N. Course c/b hemorrhagic transformation of R cerebellar infarct with mass effect, after which pt transferred to NSCU for monitoring.    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 Mechanism of stroke potentially cardioembolic due to Afib.     NEURO: Patient remains neurologically stable since transfer back to stroke unit on 9/1. Previously pt's mental status had decreased on 8/29 and given R cerebellar infarct with mass effect and hemorrhagic conversion, Eliquis held and pt transferred to NSCU for closer monitoring.  MRI with results as above, repeat MRI w/o contrast to evaluate potential evolution of underlying ischemic stroke and hemorrhagic transformation. Now on heparin gtt and will start eliquis 9/4 after repeat stable CTH. Goal for normotension. Physical therapy/OT recc AR.    ANTITHROMBOTIC THERAPY: heparin gtt with PTT goal of 40-60. Plan to transition to eliquis once CTH stable today     PULMONARY: protecting airway, saturating well on 4L NC. CXR with pulmonary edema, will c/w lasix. C/w douneb q 6 hr. Will consider pulm consult. Pro BNP 3928.     CARDIOVASCULAR: TTE EF 55-60%, continue cardiac monitoring. Has hx of afib, now on heparin gtt. Will transition to eliquis today after repeat stable CTH. Restarted home lasix 20mg and HTN meds- losartan and norvasc for BP control.            SBP goal: < 160    GASTROINTESTINAL: Swallow eval recommending pureed. S/p MBS with diet upgrade on 9/2      Diet: minced and moist with mildly thick liquids, fed by teaspoon only    RENAL: BUN/Cr stable, good urine output. Now off hypertonic saline      Na Goal: 145-150     Ying: No    HEMATOLOGY: H/H within normal limits, Platelets 176     DVT ppx: heparin gtt     ID: Afebrile, resolved leukocytosis     OTHER: Plan discussed with patient and family at beside.     DISPOSITION: acute Rehab once stable and workup is complete. Patient pending medicaid insurance     CORE MEASURES:        Admission NIHSS: 1     TPA: [] YES [x] NO      LDL/HDL: 51/51     Depression Screen: no      Statin Therapy: yes     Dysphagia Screen: [] PASS [x] FAIL     Smoking [] YES [x] NO      Afib [x] YES [] NO     Stroke Education [x] YES [] NO    Obtain screening lower extremity venous ultrasound in patients who meet 1 or more of the following criteria as patient is high risk for DVT/PE on admission:   [] History of DVT/PE  []Hypercoagulable states (Factor V Leiden, Cancer, OCP, etc. )  []Prolonged immobility (hemiplegia/hemiparesis/post operative or any other extended immobilization)  [] Transferred from outside facility (Rehab or Long term care)  [] Age </= to 50   82M R-handed PMHx Afib (not on AC), HTN, and strokes (2021, 2022; residual deficit of dysarthria) presenting with sudden onset "dizziness." No prior h/o similar symptoms. Vitals notable for systolic >180s. CBC and CMP WNL. CTH showing chronic left parietotemporal infarct but no acute findings. CTA neck w/o flow-limiting stenosis but CTA head demonstrating high-grade narrowing of proximal basilar artery with patency of distal basilar artery and its branches. Pt not a candidate for tenecteplase due to non-disabling symptoms and not a thrombectomy candidate due to no LVO seen on CTA H/N. Course c/b hemorrhagic transformation of R cerebellar infarct with mass effect, after which pt transferred to NSCU for monitoring.    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 Mechanism of stroke potentially cardioembolic due to Afib.     NEURO: Patient remains neurologically stable since transfer back to stroke unit on 9/1. C/o intermittent dizziness throughout hospital course, will tx with zofran and meclizine PRN. CTH stable on 9/4. Previously pt's mental status had decreased on 8/29 and given R cerebellar infarct with mass effect and hemorrhagic conversion, Eliquis held and pt transferred to NSCU for closer monitoring.  MRI with results as above, repeat MRI w/o contrast to evaluate potential evolution of underlying ischemic stroke and hemorrhagic transformation. Goal for normotension. Physical therapy/OT recc AR.    ANTITHROMBOTIC THERAPY: eliquis 5mg BID      PULMONARY: protecting airway, saturating well on 4L NC. CXR with pulmonary edema, will c/w lasix. C/w douneb q 6 hr. Will consider pulm consult. Pro BNP 3928.     CARDIOVASCULAR: TTE EF 55-60%, continue cardiac monitoring. Has hx of afib, now on eliquis 5mg bid. Restarted home lasix 20mg and HTN meds- losartan and norvasc for BP control.  Pro BNP is 3928.         SBP goal: < 160    GASTROINTESTINAL: Swallow eval recommending pureed. S/p MBS with diet upgrade on 9/2      Diet: minced and moist with mildly thick liquids, fed by teaspoon only    RENAL: BUN/Cr stable, good urine output. Now off hypertonic saline      Na Goal: 145-150     Ying: No    HEMATOLOGY: H/H within normal limits, Platelets 176     DVT ppx: heparin gtt     ID: Afebrile, resolved leukocytosis     OTHER: Plan discussed with patient and family at beside.     DISPOSITION: acute Rehab once stable and workup is complete. Patient pending medicaid insurance     CORE MEASURES:        Admission NIHSS: 1     TPA: [] YES [x] NO      LDL/HDL: 51/51     Depression Screen: no      Statin Therapy: yes     Dysphagia Screen: [] PASS [x] FAIL     Smoking [] YES [x] NO      Afib [x] YES [] NO     Stroke Education [x] YES [] NO    Obtain screening lower extremity venous ultrasound in patients who meet 1 or more of the following criteria as patient is high risk for DVT/PE on admission:   [] History of DVT/PE  []Hypercoagulable states (Factor V Leiden, Cancer, OCP, etc. )  []Prolonged immobility (hemiplegia/hemiparesis/post operative or any other extended immobilization)  [] Transferred from outside facility (Rehab or Long term care)  [] Age </= to 50

## 2023-09-04 NOTE — PROGRESS NOTE ADULT - NS ATTEND AMEND GEN_ALL_CORE FT
I saw and examined the patient, reviewed diagnostic studies, and reviewed images personally. I agree with PA’s history, exam, orders placed, and plan of care. Medical issues needing to be addressed include: Cerebellar infarction w/ edema, prior stroke w/ residual dysarthria, Afib not on AC (pt stopped as he was worried about bleeding), dizziness. Stroke likely 2/2 cardioembolism in setting of AFib and medication noncompliance. 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 CTH today stable. Transition heparin gtt to Eliquis 5mg BID.

## 2023-09-04 NOTE — PROGRESS NOTE ADULT - SUBJECTIVE AND OBJECTIVE BOX
THE PATIENT WAS SEEN AND EXAMINED BY ME WITH THE HOUSESTAFF AND STROKE TEAM DURING MORNING ROUNDS.   HPI:  82M R-handed PMHx Afib (not on AC), HTN, and strokes (2021, 2022; residual deficit of dysarthria) presenting with sudden onset "dizziness." Pt was lying down in his bed getting ready to sleep when he noticed sudden onset of "room-spinning sensation." No prior h/o similar symptoms. No fever, chills, HA, nausea or vomiting. No focal numbness or weakness at the time of symptom onset. Symptoms continued to persist therefore, visited ED. Code stroke activated upon arrival. LKN: 0000 8/28/23. NIHSS: 1 (dysarthria). mRS: 1.       SUBJECTIVE: No events overnight.  No new neurologic complaints.  ROS unable to be obtained    acetaminophen     Tablet .. 650 milliGRAM(s) Oral every 6 hours PRN  albuterol/ipratropium for Nebulization 3 milliLiter(s) Nebulizer every 6 hours  amLODIPine   Tablet 5 milliGRAM(s) Oral daily  atorvastatin 80 milliGRAM(s) Oral at bedtime  furosemide    Tablet 20 milliGRAM(s) Oral daily  heparin  Infusion 1300 Unit(s)/Hr IV Continuous <Continuous>  losartan 25 milliGRAM(s) Oral daily  senna 2 Tablet(s) Oral at bedtime      PHYSICAL EXAM:   Vital Signs Last 24 Hrs  T(C): 37 (04 Sep 2023 04:00), Max: 37.2 (03 Sep 2023 20:00)  T(F): 98.6 (04 Sep 2023 04:00), Max: 99 (03 Sep 2023 20:00)  HR: 73 (04 Sep 2023 06:00) (63 - 82)  BP: 160/79 (04 Sep 2023 06:00) (146/74 - 178/90)  BP(mean): 97 (04 Sep 2023 06:00) (95 - 119)  RR: 24 (04 Sep 2023 06:00) (17 - 28)  SpO2: 98% (04 Sep 2023 06:00) (92% - 98%)    Parameters below as of 04 Sep 2023 06:00  Patient On (Oxygen Delivery Method): nasal cannula  O2 Flow (L/min): 4      General: No acute distress  HEENT: EOM intact, visual fields full  Abdomen: Soft, nontender, nondistended   Extremities: No edema    NEUROLOGICAL EXAM:  Mental status: Alert, Oriented to self, hospital, not year or age  Cranial Nerves: PERRL, intact BTT b/l, tracks examiner with eyes, No facial asymmetry, mild dysarthria  Motor exam: Normal tone, no drift in b/l UE and LE   Sensation: Intact to light touch   Coordination: R side dysmetria   Gait: Deferred     LABS:                        13.8   6.16  )-----------( 176      ( 04 Sep 2023 02:11 )             41.3    09-04    140  |  101  |  11  ----------------------------<  119<H>  3.2<L>   |  27  |  0.80    Ca    8.8      04 Sep 2023 02:11    PTT - ( 04 Sep 2023 02:11 )  PTT:35.4 sec      IMAGING: Reviewed by me.       CT Head No Cont (08.30.23)  No significant interval change from 8/29/2023.  Similar-appearing acute right lateral cerebellar infarct with similar   hemorrhagic transformation.    CT Head No Cont (09.01.23   Similar-appearing hemorrhagic right cerebellar hemisphere infarction with   similar mass effect upon the fourth ventricle which is significantly   effaced.  Redemonstration of chronic left temporal infarct.  No hydrocephalus, midline shift, or effacement of the basal cisterns.  IMPRESSION:  No significant interval change from 8/31/2023.    MR Head No Cont (09.01.23  Reidentified is a large subacute infarct in the right cerebellum and   medial left cerebellum. Mild secondary mass effect upon the fourth   ventricle appears stable. Ventricular size is stable. No associated   hemorrhage. Reidentified is a chronic infarct in the left posterior MCA   territory.     MR Head No Cont (08.28.23   MRI brain: Acute infarct in the right anterior cerebellum with associated   internal hemorrhagic blood products.  No significant mass effect. There   are chronic infarct in the left cerebellum, as well as a large chronic   infarct of the left posterior MCA territory with old hemorrhagic blood   products.  MRA brain: No hemodynamically significant stenosis  MRA neck: Markedly limited by significant motion. No gross evidence of   occlusion.   THE PATIENT WAS SEEN AND EXAMINED BY ME WITH THE HOUSESTAFF AND STROKE TEAM DURING MORNING ROUNDS.   HPI:  82M R-handed PMHx Afib (not on AC), HTN, and strokes (2021, 2022; residual deficit of dysarthria) presenting with sudden onset "dizziness." Pt was lying down in his bed getting ready to sleep when he noticed sudden onset of "room-spinning sensation." No prior h/o similar symptoms. No fever, chills, HA, nausea or vomiting. No focal numbness or weakness at the time of symptom onset. Symptoms continued to persist therefore, visited ED. Code stroke activated upon arrival. LKN: 0000 8/28/23. NIHSS: 1 (dysarthria). mRS: 1.       SUBJECTIVE: C/o fluctuating symptoms of dizziness when moving from bed to chair.  ROS unable to be obtained    acetaminophen     Tablet .. 650 milliGRAM(s) Oral every 6 hours PRN  albuterol/ipratropium for Nebulization 3 milliLiter(s) Nebulizer every 6 hours  amLODIPine   Tablet 5 milliGRAM(s) Oral daily  atorvastatin 80 milliGRAM(s) Oral at bedtime  furosemide    Tablet 20 milliGRAM(s) Oral daily  heparin  Infusion 1300 Unit(s)/Hr IV Continuous <Continuous>  losartan 25 milliGRAM(s) Oral daily  senna 2 Tablet(s) Oral at bedtime      PHYSICAL EXAM:   Vital Signs Last 24 Hrs  T(C): 37 (04 Sep 2023 04:00), Max: 37.2 (03 Sep 2023 20:00)  T(F): 98.6 (04 Sep 2023 04:00), Max: 99 (03 Sep 2023 20:00)  HR: 73 (04 Sep 2023 06:00) (63 - 82)  BP: 160/79 (04 Sep 2023 06:00) (146/74 - 178/90)  BP(mean): 97 (04 Sep 2023 06:00) (95 - 119)  RR: 24 (04 Sep 2023 06:00) (17 - 28)  SpO2: 98% (04 Sep 2023 06:00) (92% - 98%)    Parameters below as of 04 Sep 2023 06:00  Patient On (Oxygen Delivery Method): nasal cannula  O2 Flow (L/min): 4      General: No acute distress  HEENT: EOM intact, visual fields full  Abdomen: Soft, nontender, nondistended   Extremities: No edema    NEUROLOGICAL EXAM:  Mental status: Alert, Oriented to self, hospital, not year or age  Cranial Nerves: PERRL, intact BTT b/l, tracks examiner with eyes, No facial asymmetry, mild dysarthria  Motor exam: Normal tone, no drift in b/l UE and LE   Sensation: Intact to light touch   Coordination: R side dysmetria   Gait: Deferred     LABS:                        13.8   6.16  )-----------( 176      ( 04 Sep 2023 02:11 )             41.3    09-04    140  |  101  |  11  ----------------------------<  119<H>  3.2<L>   |  27  |  0.80    Ca    8.8      04 Sep 2023 02:11    PTT - ( 04 Sep 2023 02:11 )  PTT:35.4 sec      IMAGING: Reviewed by me.       CT Head No Cont (08.30.23)  No significant interval change from 8/29/2023.  Similar-appearing acute right lateral cerebellar infarct with similar   hemorrhagic transformation.    CT Head No Cont (09.01.23   Similar-appearing hemorrhagic right cerebellar hemisphere infarction with   similar mass effect upon the fourth ventricle which is significantly   effaced.  Redemonstration of chronic left temporal infarct.  No hydrocephalus, midline shift, or effacement of the basal cisterns.  IMPRESSION:  No significant interval change from 8/31/2023.    MR Head No Cont (09.01.23  Reidentified is a large subacute infarct in the right cerebellum and   medial left cerebellum. Mild secondary mass effect upon the fourth   ventricle appears stable. Ventricular size is stable. No associated   hemorrhage. Reidentified is a chronic infarct in the left posterior MCA   territory.     MR Head No Cont (08.28.23   MRI brain: Acute infarct in the right anterior cerebellum with associated   internal hemorrhagic blood products.  No significant mass effect. There   are chronic infarct in the left cerebellum, as well as a large chronic   infarct of the left posterior MCA territory with old hemorrhagic blood   products.  MRA brain: No hemodynamically significant stenosis  MRA neck: Markedly limited by significant motion. No gross evidence of   occlusion.

## 2023-09-04 NOTE — PROGRESS NOTE ADULT - SUBJECTIVE AND OBJECTIVE BOX
Subjective: Patient seen and examined. No new events except as noted.   Remains in Stroke Unit.     REVIEW OF SYSTEMS:    CONSTITUTIONAL: + weakness, fevers or chills  EYES/ENT: No visual changes;  No vertigo or throat pain   NECK: No pain or stiffness  RESPIRATORY: No cough, wheezing, hemoptysis; No shortness of breath  CARDIOVASCULAR: No chest pain or palpitations  GASTROINTESTINAL: No abdominal or epigastric pain. No nausea, vomiting, or hematemesis; No diarrhea or constipation. No melena or hematochezia.  GENITOURINARY: No dysuria, frequency or hematuria  NEUROLOGICAL: No numbness or weakness  SKIN: No itching, burning, rashes, or lesions   All other review of systems is negative unless indicated above.    MEDICATIONS:  MEDICATIONS  (STANDING):  albuterol/ipratropium for Nebulization 3 milliLiter(s) Nebulizer every 6 hours  amLODIPine   Tablet 5 milliGRAM(s) Oral daily  atorvastatin 80 milliGRAM(s) Oral at bedtime  heparin  Infusion 1000 Unit(s)/Hr (12 mL/Hr) IV Continuous <Continuous>  psyllium Powder 1 Packet(s) Oral every 24 hours  senna 2 Tablet(s) Oral at bedtime    PHYSICAL EXAM:  Vital Signs Last 24 Hrs  T(C): 36.4 (04 Sep 2023 08:00), Max: 37.2 (03 Sep 2023 20:00)  T(F): 97.6 (04 Sep 2023 08:00), Max: 99 (03 Sep 2023 20:00)  HR: 72 (04 Sep 2023 10:00) (63 - 82)  BP: 135/96 (04 Sep 2023 10:00) (135/96 - 178/90)  BP(mean): 106 (04 Sep 2023 10:00) (92 - 119)  RR: 24 (04 Sep 2023 10:00) (17 - 28)  SpO2: 95% (04 Sep 2023 10:00) (95% - 98%)    Parameters below as of 04 Sep 2023 10:00  Patient On (Oxygen Delivery Method): nasal cannula  O2 Flow (L/min): 2    I&O's Summary    03 Sep 2023 07:01  -  04 Sep 2023 07:00  --------------------------------------------------------  IN: 416.5 mL / OUT: 950 mL / NET: -533.5 mL    04 Sep 2023 07:01  -  04 Sep 2023 11:24  --------------------------------------------------------  IN: 0 mL / OUT: 500 mL / NET: -500 mL    Appearance: Normal	  HEENT:   Normal oral mucosa, PERRL, EOMI	  Lymphatic: No lymphadenopathy , no edema  Cardiovascular: Irregular S1 S2, No JVD, No murmurs , Peripheral pulses palpable 2+ bilaterally  Respiratory: Lungs clear to auscultation, normal effort 	  Gastrointestinal:  Soft, Non-tender, + BS	  Skin: No rashes, No ecchymoses, No cyanosis, warm to touch  NEUROLOGICAL EXAM:  Mental status: Alert, Oriented to self, hospital, not year or age  Cranial Nerves: PERRL, intact BTT b/l, tracks examiner with eyes, No facial asymmetry, mild dysarthria  Motor exam: Normal tone, no drift in b/l UE and LE   Sensation: Intact to light touch   Coordination: R side dysmetria   Gait: Deferred   Ext: No edema    LABS:    CARDIAC MARKERS:                        13.8   6.16  )-----------( 176      ( 04 Sep 2023 02:11 )             41.3     09-04    140  |  101  |  11  ----------------------------<  119<H>  3.2<L>   |  27  |  0.80    Ca    8.8      04 Sep 2023 02:11    proBNP:   Lipid Profile:   HgA1c:   TSH:     TELEMETRY: Afib  	    ECG:  	  RADIOLOGY:   DIAGNOSTIC TESTING:  [ ] Echocardiogram:  [ ]  Catheterization:  [ ] Stress Test:    OTHER:

## 2023-09-05 ENCOUNTER — TRANSCRIPTION ENCOUNTER (OUTPATIENT)
Age: 83
End: 2023-09-05

## 2023-09-05 LAB
ANION GAP SERPL CALC-SCNC: 13 MMOL/L — SIGNIFICANT CHANGE UP (ref 5–17)
BUN SERPL-MCNC: 12 MG/DL — SIGNIFICANT CHANGE UP (ref 7–23)
CALCIUM SERPL-MCNC: 8.9 MG/DL — SIGNIFICANT CHANGE UP (ref 8.4–10.5)
CHLORIDE SERPL-SCNC: 101 MMOL/L — SIGNIFICANT CHANGE UP (ref 96–108)
CO2 SERPL-SCNC: 25 MMOL/L — SIGNIFICANT CHANGE UP (ref 22–31)
CREAT SERPL-MCNC: 0.84 MG/DL — SIGNIFICANT CHANGE UP (ref 0.5–1.3)
EGFR: 88 ML/MIN/1.73M2 — SIGNIFICANT CHANGE UP
GLUCOSE SERPL-MCNC: 110 MG/DL — HIGH (ref 70–99)
HCT VFR BLD CALC: 44 % — SIGNIFICANT CHANGE UP (ref 39–50)
HGB BLD-MCNC: 14.6 G/DL — SIGNIFICANT CHANGE UP (ref 13–17)
MCHC RBC-ENTMCNC: 32.2 PG — SIGNIFICANT CHANGE UP (ref 27–34)
MCHC RBC-ENTMCNC: 33.2 GM/DL — SIGNIFICANT CHANGE UP (ref 32–36)
MCV RBC AUTO: 96.9 FL — SIGNIFICANT CHANGE UP (ref 80–100)
NRBC # BLD: 0 /100 WBCS — SIGNIFICANT CHANGE UP (ref 0–0)
PLATELET # BLD AUTO: 199 K/UL — SIGNIFICANT CHANGE UP (ref 150–400)
POTASSIUM SERPL-MCNC: 3.4 MMOL/L — LOW (ref 3.5–5.3)
POTASSIUM SERPL-SCNC: 3.4 MMOL/L — LOW (ref 3.5–5.3)
RBC # BLD: 4.54 M/UL — SIGNIFICANT CHANGE UP (ref 4.2–5.8)
RBC # FLD: 12 % — SIGNIFICANT CHANGE UP (ref 10.3–14.5)
SARS-COV-2 RNA SPEC QL NAA+PROBE: SIGNIFICANT CHANGE UP
SODIUM SERPL-SCNC: 139 MMOL/L — SIGNIFICANT CHANGE UP (ref 135–145)
WBC # BLD: 9.17 K/UL — SIGNIFICANT CHANGE UP (ref 3.8–10.5)
WBC # FLD AUTO: 9.17 K/UL — SIGNIFICANT CHANGE UP (ref 3.8–10.5)

## 2023-09-05 PROCEDURE — 99233 SBSQ HOSP IP/OBS HIGH 50: CPT

## 2023-09-05 RX ORDER — LOSARTAN POTASSIUM 100 MG/1
1 TABLET, FILM COATED ORAL
Qty: 0 | Refills: 0 | DISCHARGE
Start: 2023-09-05

## 2023-09-05 RX ORDER — IPRATROPIUM/ALBUTEROL SULFATE 18-103MCG
3 AEROSOL WITH ADAPTER (GRAM) INHALATION
Qty: 0 | Refills: 0 | DISCHARGE
Start: 2023-09-05

## 2023-09-05 RX ORDER — FUROSEMIDE 40 MG
1 TABLET ORAL
Qty: 0 | Refills: 0 | DISCHARGE
Start: 2023-09-05

## 2023-09-05 RX ORDER — ATORVASTATIN CALCIUM 80 MG/1
1 TABLET, FILM COATED ORAL
Qty: 30 | Refills: 0
Start: 2023-09-05

## 2023-09-05 RX ORDER — MECLIZINE HCL 12.5 MG
1 TABLET ORAL
Qty: 0 | Refills: 0 | DISCHARGE
Start: 2023-09-05

## 2023-09-05 RX ORDER — FUROSEMIDE 40 MG
1 TABLET ORAL
Refills: 0 | DISCHARGE

## 2023-09-05 RX ORDER — POTASSIUM CHLORIDE 20 MEQ
40 PACKET (EA) ORAL ONCE
Refills: 0 | Status: COMPLETED | OUTPATIENT
Start: 2023-09-05 | End: 2023-09-05

## 2023-09-05 RX ORDER — AMLODIPINE BESYLATE 2.5 MG/1
1 TABLET ORAL
Qty: 0 | Refills: 0 | DISCHARGE
Start: 2023-09-05

## 2023-09-05 RX ORDER — LOSARTAN POTASSIUM 100 MG/1
1 TABLET, FILM COATED ORAL
Refills: 0 | DISCHARGE

## 2023-09-05 RX ORDER — SENNA PLUS 8.6 MG/1
2 TABLET ORAL
Qty: 0 | Refills: 0 | DISCHARGE
Start: 2023-09-05

## 2023-09-05 RX ORDER — AMLODIPINE BESYLATE 2.5 MG/1
1 TABLET ORAL
Refills: 0 | DISCHARGE

## 2023-09-05 RX ADMIN — AMLODIPINE BESYLATE 5 MILLIGRAM(S): 2.5 TABLET ORAL at 05:39

## 2023-09-05 RX ADMIN — SENNA PLUS 2 TABLET(S): 8.6 TABLET ORAL at 23:57

## 2023-09-05 RX ADMIN — ATORVASTATIN CALCIUM 80 MILLIGRAM(S): 80 TABLET, FILM COATED ORAL at 23:57

## 2023-09-05 RX ADMIN — APIXABAN 5 MILLIGRAM(S): 2.5 TABLET, FILM COATED ORAL at 05:39

## 2023-09-05 RX ADMIN — Medication 20 MILLIGRAM(S): at 05:39

## 2023-09-05 RX ADMIN — Medication 3 MILLILITER(S): at 18:00

## 2023-09-05 RX ADMIN — APIXABAN 5 MILLIGRAM(S): 2.5 TABLET, FILM COATED ORAL at 18:00

## 2023-09-05 RX ADMIN — Medication 3 MILLILITER(S): at 13:17

## 2023-09-05 RX ADMIN — LOSARTAN POTASSIUM 50 MILLIGRAM(S): 100 TABLET, FILM COATED ORAL at 05:39

## 2023-09-05 RX ADMIN — Medication 3 MILLILITER(S): at 01:16

## 2023-09-05 RX ADMIN — Medication 40 MILLIEQUIVALENT(S): at 13:16

## 2023-09-05 RX ADMIN — Medication 3 MILLILITER(S): at 23:58

## 2023-09-05 RX ADMIN — Medication 3 MILLILITER(S): at 05:39

## 2023-09-05 RX ADMIN — Medication 100 MILLIGRAM(S): at 05:39

## 2023-09-05 NOTE — DISCHARGE NOTE PROVIDER - CARE PROVIDER_API CALL
Helena Johnston  NP in Family Health  [address], [address]  [place] [address]  Phone: [phone]  Fax: [phone]  Follow Up Time: 2 weeks    Jorge Romeo  [address], [address]  [place] [address]  Phone: [phone]  Fax: [phone]  Follow Up Time: 2 weeks

## 2023-09-05 NOTE — PROGRESS NOTE ADULT - NS ATTEND AMEND GEN_ALL_CORE FT
I saw and examined the patient, reviewed diagnostic studies, and reviewed images personally. I agree with PA’s history, exam, orders placed, and plan of care. Medical issues needing to be addressed include: Cerebellar infarction w/ edema, prior stroke w/ residual dysarthria, Afib not on AC (pt stopped as he was worried about bleeding), dizziness. Stroke likely 2/2 cardioembolism in setting of AFib and medication noncompliance. 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 CTH stable yesterday Eliquis started. Pt doing well. PT/OT re-evaluation.

## 2023-09-05 NOTE — DISCHARGE NOTE PROVIDER - PROVIDER TOKENS
PROVIDER:[TOKEN:[[id number]],FOLLOWUP:[2 weeks]],PROVIDER:[TOKEN:[[id number]],FOLLOWUP:[2 weeks]]

## 2023-09-05 NOTE — DISCHARGE NOTE PROVIDER - HOSPITAL COURSE
82M R-handed PMHx Afib (not on AC), HTN, and strokes (2021, 2022; residual deficit of dysarthria) presenting with sudden onset "dizziness." Pt was lying down in his bed getting ready to sleep when he noticed sudden onset of "room-spinning sensation." No prior h/o similar symptoms. No fever, chills, HA, nausea or vomiting. No focal numbness or weakness at the time of symptom onset. Symptoms continued to persist therefore, visited ED. Code stroke activated upon arrival. LKN: 0000 8/28/23. NIHSS: 1 (dysarthria). mRS: 1.     CT Head No Cont (08.30.23)  No significant interval change from 8/29/2023.  Similar-appearing acute right lateral cerebellar infarct with similar   hemorrhagic transformation.    CT Head No Cont (09.01.23)  Similar-appearing hemorrhagic right cerebellar hemisphere infarction with   similar mass effect upon the fourth ventricle which is significantly   effaced.  Redemonstration of chronic left temporal infarct.  No hydrocephalus, midline shift, or effacement of the basal cisterns.    MR Head No Cont (09.01.23)  Reidentified is a large subacute infarct in the right cerebellum and   medial left cerebellum. Mild secondary mass effect upon the fourth   ventricle appears stable. Ventricular size is stable. No associated   hemorrhage. Reidentified is a chronic infarct in the left posterior MCA   territory.    MR Head No Cont (08.28.23)  MRI brain: Acute infarct in the right anterior cerebellum with associated   internal hemorrhagic blood products.  No significant mass effect. There   are chronic infarct in the left cerebellum, as well as a large chronic   infarct of the left posterior MCA territory with old hemorrhagic blood   products.  MRA brain: No hemodynamically significant stenosis  MRA neck: Markedly limited by significant motion. No gross evidence of   occlusion.    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 Mechanism of stroke potentially cardioembolic due to Afib.     Started on eliquis 5 mg BID for secondary stroke prevention in setting of afib and basilar thrombus.       Patient endorsed intermittent dizziness throughout hospital course, zofran and meclizine PRN.  Previously pt's mental status had decreased on 8/29 and given R cerebellar infarct with mass effect and hemorrhagic conversion, Eliquis held and pt transferred to NSCU for closer monitoring. Patient was stable and transferred back to stroke unit on 9/1.     CXR with pulmonary edema, will c/w lasix. C/w douneb q6h. Pro BNP 3928.     TTE EF 55-60%, continue cardiac monitoring. Has hx of afib, now on Eliquis 5mg bid. Restarted home Lasix 20mg and HTN meds - losartan and norvasc for BP control.     Swallow eval recommending pureed. S/p MBS with diet upgrade on 9/2.    PT/OT recommended AR however patient would like to go home with 24 hr supervision provided by family. 82M R-handed PMHx Afib (not on AC), HTN, and strokes (2021, 2022; residual deficit of dysarthria) presenting with sudden onset "dizziness." Pt was lying down in his bed getting ready to sleep when he noticed sudden onset of "room-spinning sensation." No prior h/o similar symptoms. No fever, chills, HA, nausea or vomiting. No focal numbness or weakness at the time of symptom onset. Symptoms continued to persist therefore, visited ED. Code stroke activated upon arrival. LKN: 0000 8/28/23. NIHSS: 1 (dysarthria). mRS: 1.     Imaging:  CT Head No Cont (08.30.23)  No significant interval change from 8/29/2023.  Similar-appearing acute right lateral cerebellar infarct with similar   hemorrhagic transformation.    CT Head No Cont (09.01.23)  Similar-appearing hemorrhagic right cerebellar hemisphere infarction with   similar mass effect upon the fourth ventricle which is significantly   effaced.  Redemonstration of chronic left temporal infarct.  No hydrocephalus, midline shift, or effacement of the basal cisterns.    MR Head No Cont (09.01.23)  Reidentified is a large subacute infarct in the right cerebellum and   medial left cerebellum. Mild secondary mass effect upon the fourth   ventricle appears stable. Ventricular size is stable. No associated   hemorrhage. Reidentified is a chronic infarct in the left posterior MCA   territory.    MR Head No Cont (08.28.23)  MRI brain: Acute infarct in the right anterior cerebellum with associated   internal hemorrhagic blood products.  No significant mass effect. There   are chronic infarct in the left cerebellum, as well as a large chronic   infarct of the left posterior MCA territory with old hemorrhagic blood   products.  MRA brain: No hemodynamically significant stenosis  MRA neck: Markedly limited by significant motion. No gross evidence of   occlusion.    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 Mechanism of stroke potentially cardioembolic due to Afib.     Started on eliquis 5 mg BID for secondary stroke prevention in setting of afib and basilar thrombus.       Patient endorsed intermittent dizziness throughout hospital course, zofran and meclizine PRN.  Previously pt's mental status had decreased on 8/29 and given R cerebellar infarct with mass effect and hemorrhagic conversion, Eliquis held and pt transferred to NSCU for closer monitoring. Patient was stable and transferred back to stroke unit on 9/1.     CXR with pulmonary edema, will c/w lasix. C/w douneb q6h. Pro BNP 3928.     TTE EF 55-60%, continue cardiac monitoring. Has hx of afib, now on Eliquis 5mg bid. Restarted home Lasix 20mg and HTN meds - losartan and norvasc for BP control.     Swallow eval recommending pureed. S/p MBS with diet upgrade on 9/2.    PT/OT recommended AR. Patient stable for discharge.

## 2023-09-05 NOTE — DISCHARGE NOTE PROVIDER - NSDCMRMEDTOKEN_GEN_ALL_CORE_FT
amLODIPine 5 mg oral tablet: 1 tab(s) orally once a day  atorvastatin 80 mg oral tablet: 1 tab(s) orally once a day (at bedtime)  Eliquis 5 mg oral tablet: 1 tab(s) orally 2 times a day MDD: 10mg  furosemide 20 mg oral tablet: 1 tab(s) orally once a day  guaiFENesin 100 mg/5 mL oral liquid: 5 milliliter(s) orally every 6 hours As needed Cough  ipratropium-albuterol 0.5 mg-2.5 mg/3 mL inhalation solution: 3 milliliter(s) inhaled every 6 hours  losartan 50 mg oral tablet: 1 tab(s) orally once a day  meclizine 25 mg oral tablet: 1 tab(s) orally once a day As needed Dizziness  senna leaf extract oral tablet: 2 tab(s) orally once a day (at bedtime)   amLODIPine 5 mg oral tablet: 1 tab(s) orally once a day  atorvastatin 20 mg oral tablet: 1 tab(s) orally once a day (at bedtime)  Eliquis 5 mg oral tablet: 1 tab(s) orally 2 times a day  furosemide 20 mg oral tablet: 1 tab(s) orally once a day  guaiFENesin 100 mg/5 mL oral liquid: 5 milliliter(s) orally every 6 hours As needed Cough  ipratropium-albuterol 0.5 mg-2.5 mg/3 mL inhalation solution: 3 milliliter(s) inhaled every 6 hours  losartan 50 mg oral tablet: 1 tab(s) orally once a day  meclizine 25 mg oral tablet: 1 tab(s) orally once a day As needed Dizziness  senna leaf extract oral tablet: 2 tab(s) orally once a day (at bedtime)

## 2023-09-05 NOTE — PROGRESS NOTE ADULT - SUBJECTIVE AND OBJECTIVE BOX
THE PATIENT WAS SEEN AND EXAMINED BY ME WITH THE HOUSESTAFF AND STROKE TEAM DURING MORNING ROUNDS.     HPI:  82M R-handed PMHx Afib (not on AC), HTN, and strokes (2021, 2022; residual deficit of dysarthria) presenting with sudden onset "dizziness." Pt was lying down in his bed getting ready to sleep when he noticed sudden onset of "room-spinning sensation." No prior h/o similar symptoms. No fever, chills, HA, nausea or vomiting. No focal numbness or weakness at the time of symptom onset. Symptoms continued to persist therefore, visited ED. Code stroke activated upon arrival. LKN: 0000 8/28/23. NIHSS: 1 (dysarthria). mRS: 1.     SUBJECTIVE: No events overnight.  No new neurologic complaints.      acetaminophen     Tablet .. 650 milliGRAM(s) Oral every 6 hours PRN  albuterol/ipratropium for Nebulization 3 milliLiter(s) Nebulizer every 6 hours  amLODIPine   Tablet 5 milliGRAM(s) Oral daily  apixaban 5 milliGRAM(s) Oral two times a day  atorvastatin 80 milliGRAM(s) Oral at bedtime  furosemide    Tablet 20 milliGRAM(s) Oral daily  guaiFENesin Oral Liquid (Sugar-Free) 100 milliGRAM(s) Oral every 6 hours PRN  losartan 50 milliGRAM(s) Oral daily  meclizine 25 milliGRAM(s) Oral daily PRN  senna 2 Tablet(s) Oral at bedtime    PHYSICAL EXAM:   Vital Signs Last 24 Hrs  T(C): 36.7 (05 Sep 2023 04:00), Max: 36.9 (04 Sep 2023 20:00)  T(F): 98 (05 Sep 2023 04:00), Max: 98.4 (04 Sep 2023 20:00)  HR: 81 (05 Sep 2023 06:00) (55 - 83)  BP: 144/100 (05 Sep 2023 06:00) (133/66 - 164/88)  BP(mean): 113 (05 Sep 2023 06:00) (83 - 113)  RR: 24 (05 Sep 2023 06:00) (19 - 24)  SpO2: 92% (05 Sep 2023 06:00) (92% - 98%)    Parameters below as of 05 Sep 2023 06:00  Patient On (Oxygen Delivery Method): nasal cannula  O2 Flow (L/min): 2    General: No acute distress  HEENT: EOM intact, visual fields full  Abdomen: Soft, nontender, nondistended   Extremities: No edema    NEUROLOGICAL EXAM:  Mental status: Alert, Oriented to self, hospital, not year or age  Cranial Nerves: PERRL, intact BTT b/l, tracks examiner with eyes, No facial asymmetry, mild dysarthria  Motor exam: Normal tone, no drift in b/l UE and LE   Sensation: Intact to light touch   Coordination: R side dysmetria   Gait: Deferred     LABS:                        14.6   9.17  )-----------( 199      ( 05 Sep 2023 06:15 )             44.0    09-05    139  |  101  |  12  ----------------------------<  110<H>  3.4<L>   |  25  |  0.84    Ca    8.9      05 Sep 2023 06:15    PTT - ( 04 Sep 2023 02:11 )  PTT:35.4 sec    IMAGING: Reviewed by me.     CT Head No Cont (08.30.23)  No significant interval change from 8/29/2023.  Similar-appearing acute right lateral cerebellar infarct with similar   hemorrhagic transformation.    CT Head No Cont (09.01.23)  Similar-appearing hemorrhagic right cerebellar hemisphere infarction with   similar mass effect upon the fourth ventricle which is significantly   effaced.  Redemonstration of chronic left temporal infarct.  No hydrocephalus, midline shift, or effacement of the basal cisterns.    MR Head No Cont (09.01.23)  Reidentified is a large subacute infarct in the right cerebellum and   medial left cerebellum. Mild secondary mass effect upon the fourth   ventricle appears stable. Ventricular size is stable. No associated   hemorrhage. Reidentified is a chronic infarct in the left posterior MCA   territory.    MR Head No Cont (08.28.23)  MRI brain: Acute infarct in the right anterior cerebellum with associated   internal hemorrhagic blood products.  No significant mass effect. There   are chronic infarct in the left cerebellum, as well as a large chronic   infarct of the left posterior MCA territory with old hemorrhagic blood   products.  MRA brain: No hemodynamically significant stenosis  MRA neck: Markedly limited by significant motion. No gross evidence of   occlusion. THE PATIENT WAS SEEN AND EXAMINED BY ME WITH THE HOUSESTAFF AND STROKE TEAM DURING MORNING ROUNDS.     HPI:  82M R-handed PMHx Afib (not on AC), HTN, and strokes (2021, 2022; residual deficit of dysarthria) presenting with sudden onset "dizziness." Pt was lying down in his bed getting ready to sleep when he noticed sudden onset of "room-spinning sensation." No prior h/o similar symptoms. No fever, chills, HA, nausea or vomiting. No focal numbness or weakness at the time of symptom onset. Symptoms continued to persist therefore, visited ED. Code stroke activated upon arrival. LKN: 0000 8/28/23. NIHSS: 1 (dysarthria). mRS: 1.     SUBJECTIVE: No events overnight.  No new neurologic complaints.       ID [id number]    acetaminophen     Tablet .. 650 milliGRAM(s) Oral every 6 hours PRN  albuterol/ipratropium for Nebulization 3 milliLiter(s) Nebulizer every 6 hours  amLODIPine   Tablet 5 milliGRAM(s) Oral daily  apixaban 5 milliGRAM(s) Oral two times a day  atorvastatin 80 milliGRAM(s) Oral at bedtime  furosemide    Tablet 20 milliGRAM(s) Oral daily  guaiFENesin Oral Liquid (Sugar-Free) 100 milliGRAM(s) Oral every 6 hours PRN  losartan 50 milliGRAM(s) Oral daily  meclizine 25 milliGRAM(s) Oral daily PRN  senna 2 Tablet(s) Oral at bedtime    PHYSICAL EXAM:   Vital Signs Last 24 Hrs  T(C): 36.7 (05 Sep 2023 04:00), Max: 36.9 (04 Sep 2023 20:00)  T(F): 98 (05 Sep 2023 04:00), Max: 98.4 (04 Sep 2023 20:00)  HR: 81 (05 Sep 2023 06:00) (55 - 83)  BP: 144/100 (05 Sep 2023 06:00) (133/66 - 164/88)  BP(mean): 113 (05 Sep 2023 06:00) (83 - 113)  RR: 24 (05 Sep 2023 06:00) (19 - 24)  SpO2: 92% (05 Sep 2023 06:00) (92% - 98%)    Parameters below as of 05 Sep 2023 06:00  Patient On (Oxygen Delivery Method): nasal cannula  O2 Flow (L/min): 2    General: No acute distress  HEENT: EOM intact, visual fields full  Abdomen: Soft, nontender, nondistended   Extremities: No edema    NEUROLOGICAL EXAM:  Mental status: Alert, Oriented to self, hospital, not year or age  Cranial Nerves: PERRL, intact BTT b/l, tracks examiner with eyes, No facial asymmetry, mild dysarthria  Motor exam: Normal tone, no drift in b/l UE and LE   Sensation: Intact to light touch   Coordination: R side dysmetria   Gait: Deferred     LABS:                        14.6   9.17  )-----------( 199      ( 05 Sep 2023 06:15 )             44.0    09-05    139  |  101  |  12  ----------------------------<  110<H>  3.4<L>   |  25  |  0.84    Ca    8.9      05 Sep 2023 06:15    PTT - ( 04 Sep 2023 02:11 )  PTT:35.4 sec    IMAGING: Reviewed by me.     CT Head No Cont (08.30.23)  No significant interval change from 8/29/2023.  Similar-appearing acute right lateral cerebellar infarct with similar   hemorrhagic transformation.    CT Head No Cont (09.01.23)  Similar-appearing hemorrhagic right cerebellar hemisphere infarction with   similar mass effect upon the fourth ventricle which is significantly   effaced.  Redemonstration of chronic left temporal infarct.  No hydrocephalus, midline shift, or effacement of the basal cisterns.    MR Head No Cont (09.01.23)  Reidentified is a large subacute infarct in the right cerebellum and   medial left cerebellum. Mild secondary mass effect upon the fourth   ventricle appears stable. Ventricular size is stable. No associated   hemorrhage. Reidentified is a chronic infarct in the left posterior MCA   territory.    MR Head No Cont (08.28.23)  MRI brain: Acute infarct in the right anterior cerebellum with associated   internal hemorrhagic blood products.  No significant mass effect. There   are chronic infarct in the left cerebellum, as well as a large chronic   infarct of the left posterior MCA territory with old hemorrhagic blood   products.  MRA brain: No hemodynamically significant stenosis  MRA neck: Markedly limited by significant motion. No gross evidence of   occlusion.

## 2023-09-05 NOTE — DISCHARGE NOTE PROVIDER - NSDCCPCAREPLAN_GEN_ALL_CORE_FT
PRINCIPAL DISCHARGE DIAGNOSIS  Diagnosis: Stroke  Assessment and Plan of Treatment: Please follow up with neurologist in 1-2 weeks after discharge. The office will call you to schedule an appointment, if you do not hear from them please call [phone]. Continue taking medications as prescribed. If you were prescribed a statin for your cholesterol please make sure you have your liver enzymes checked with your primary care physician. Monitor your blood pressure. Reduce fat, cholesterol and salt in your diet. Increase intake of fruits and vegetables. Limit alcohol to minimum and do not smoke. You may be at risk for falling, make changes to your home to help you walk easier. Keep up to date on vaccinations.  If you experience any symptoms of facial drooping, slurred speech, arm or leg weakness, severe headache, vision changes or any worsening symptoms, notify provider immediately and return to ER.

## 2023-09-05 NOTE — PROGRESS NOTE ADULT - ASSESSMENT
82M R-handed PMHx Afib (not on AC), HTN, and strokes (2021, 2022; residual deficit of dysarthria) presenting with sudden onset "dizziness." No prior h/o similar symptoms. Vitals notable for systolic >180s. CBC and CMP WNL. CTH showing chronic left parietotemporal infarct but no acute findings. CTA neck w/o flow-limiting stenosis but CTA head demonstrating high-grade narrowing of proximal basilar artery with patency of distal basilar artery and its branches. Pt not a candidate for tenecteplase due to non-disabling symptoms and not a thrombectomy candidate due to no LVO seen on CTA H/N. Course c/b hemorrhagic transformation of R cerebellar infarct with mass effect, after which pt transferred to NSCU for monitoring.    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 Mechanism of stroke potentially cardioembolic due to Afib.     NEURO: Patient remains neurologically stable since transfer back to stroke unit on 9/1. C/o intermittent dizziness throughout hospital course, zofran and meclizine PRN. CTH stable on 9/4. Previously pt's mental status had decreased on 8/29 and given R cerebellar infarct with mass effect and hemorrhagic conversion, Eliquis held and pt transferred to NSCU for closer monitoring. MRI with results as above, repeat MRI w/o contrast to evaluate potential evolution of underlying ischemic stroke and hemorrhagic transformation with stable findings. Goal for normotension. Physical therapy/OT rec AR.    ANTITHROMBOTIC THERAPY: Eliquis 5mg BID      PULMONARY: protecting airway, saturating well on 4L NC. CXR with pulmonary edema, will c/w lasix. C/w douneb q6h. Will consider pulm consult. Pro BNP 3928.     CARDIOVASCULAR: TTE EF 55-60%, continue cardiac monitoring. Has hx of afib, now on Eliquis 5mg bid. Restarted home Lasix 20mg and HTN meds - losartan and norvasc for BP control.  Pro BNP is 3928.         SBP goal: < 160    GASTROINTESTINAL: Swallow eval recommending pureed. S/p MBS with diet upgrade on 9/2.     Diet: minced and moist with mildly thick liquids, fed by teaspoon only    RENAL: BUN/Cr stable, good urine output. Now off hypertonic saline      Na Goal: 145-150     Ying: No    HEMATOLOGY: H/H within normal limits, Platelets 176     DVT ppx: heparin gtt     ID: Afebrile, resolved leukocytosis     OTHER: Plan discussed with patient and family at beside.     DISPOSITION: acute Rehab once stable and workup is complete. Patient pending medicaid insurance     CORE MEASURES:        Admission NIHSS: 1     TPA: [] YES [x] NO      LDL/HDL: 51/51     Depression Screen: no      Statin Therapy: yes     Dysphagia Screen: [] PASS [x] FAIL     Smoking [] YES [x] NO      Afib [x] YES [] NO     Stroke Education [x] YES [] NO    Obtain screening lower extremity venous ultrasound in patients who meet 1 or more of the following criteria as patient is high risk for DVT/PE on admission:   [] History of DVT/PE  []Hypercoagulable states (Factor V Leiden, Cancer, OCP, etc. )  []Prolonged immobility (hemiplegia/hemiparesis/post operative or any other extended immobilization)  [] Transferred from outside facility (Rehab or Long term care)  [] Age </= to 50   82M R-handed PMHx Afib (not on AC), HTN, and strokes (2021, 2022; residual deficit of dysarthria) presenting with sudden onset "dizziness." No prior h/o similar symptoms. Vitals notable for systolic >180s. CBC and CMP WNL. CTH showing chronic left parietotemporal infarct but no acute findings. CTA neck w/o flow-limiting stenosis but CTA head demonstrating high-grade narrowing of proximal basilar artery with patency of distal basilar artery and its branches. Pt not a candidate for tenecteplase due to non-disabling symptoms and not a thrombectomy candidate due to no LVO seen on CTA H/N. Course c/b hemorrhagic transformation of R cerebellar infarct with mass effect, after which pt transferred to NSCU for monitoring.    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 Mechanism of stroke potentially cardioembolic due to Afib.     NEURO: Patient remains neurologically stable since transfer back to stroke unit on 9/1. C/o intermittent dizziness throughout hospital course, zofran and meclizine PRN. CTH stable on 9/4. Previously pt's mental status had decreased on 8/29 and given R cerebellar infarct with mass effect and hemorrhagic conversion, Eliquis held and pt transferred to NSCU for closer monitoring. MRI with results as above, repeat MRI w/o contrast to evaluate potential evolution of underlying ischemic stroke and hemorrhagic transformation with stable findings. Goal for normotension. Physical therapy/OT rec AR.    ANTITHROMBOTIC THERAPY: Eliquis 5mg BID      PULMONARY: protecting airway, saturating well on 4L NC. CXR with pulmonary edema, will c/w lasix. C/w douneb q6h. Will consider pulm consult. Pro BNP 3928.     CARDIOVASCULAR: TTE EF 55-60%, continue cardiac monitoring. Has hx of afib, now on Eliquis 5mg bid. Restarted home Lasix 20mg and HTN meds - losartan and norvasc for BP control.  Pro BNP is 3928.         SBP goal: < 160    GASTROINTESTINAL: Swallow eval recommending pureed. S/p MBS with diet upgrade on 9/2.     Diet: minced and moist with mildly thick liquids, fed by teaspoon only    RENAL: BUN/Cr stable, good urine output. Now off hypertonic saline      Na Goal: 145-150     Ying: No    HEMATOLOGY: H/H within normal limits, Platelets 199     DVT ppx: Eliquis    ID: Afebrile, resolved leukocytosis     OTHER: Plan discussed with patient and family at beside.     DISPOSITION: acute Rehab once stable and workup is complete. Patient pending medicaid insurance     CORE MEASURES:        Admission NIHSS: 1     TPA: [] YES [x] NO      LDL/HDL: 51/51     Depression Screen: no      Statin Therapy: yes     Dysphagia Screen: [] PASS [x] FAIL     Smoking [] YES [x] NO      Afib [x] YES [] NO     Stroke Education [x] YES [] NO    Obtain screening lower extremity venous ultrasound in patients who meet 1 or more of the following criteria as patient is high risk for DVT/PE on admission:   [] History of DVT/PE  [] Hypercoagulable states (Factor V Leiden, Cancer, OCP, etc. )  [] Prolonged immobility (hemiplegia/hemiparesis/post operative or any other extended immobilization)  [] Transferred from outside facility (Rehab or Long term care)  [] Age </= to 50   82M R-handed PMHx Afib (not on AC), HTN, and strokes (2021, 2022; residual deficit of dysarthria) presenting with sudden onset "dizziness." No prior h/o similar symptoms. Vitals notable for systolic >180s. CBC and CMP WNL. CTH showing chronic left parietotemporal infarct but no acute findings. CTA neck w/o flow-limiting stenosis but CTA head demonstrating high-grade narrowing of proximal basilar artery with patency of distal basilar artery and its branches. Pt not a candidate for tenecteplase due to non-disabling symptoms and not a thrombectomy candidate due to no LVO seen on CTA H/N. Course c/b hemorrhagic transformation of R cerebellar infarct with mass effect, after which pt transferred to NSCU for monitoring.    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 Mechanism of stroke potentially cardioembolic due to Afib.     NEURO: Patient remains neurologically stable since transfer back to stroke unit on 9/1. C/o intermittent dizziness throughout hospital course, zofran and meclizine PRN. CTH stable on 9/4. Previously pt's mental status had decreased on 8/29 and given R cerebellar infarct with mass effect and hemorrhagic conversion, Eliquis held and pt transferred to NSCU for closer monitoring. MRI with results as above, repeat MRI w/o contrast to evaluate potential evolution of underlying ischemic stroke and hemorrhagic transformation with stable findings. Goal for normotension. Physical therapy/OT rec AR.    ANTITHROMBOTIC THERAPY: Eliquis 5mg BID  (free trial approved)     PULMONARY: protecting airway, saturating well on 4L NC. CXR with pulmonary edema, will c/w lasix. C/w douneb q6h.     CARDIOVASCULAR: TTE EF 55-60%, continue cardiac monitoring. Has hx of afib, now on Eliquis 5mg bid. Restarted home Lasix 20mg and HTN meds - losartan and norvasc for BP control.  Pro BNP is 3928.         SBP goal: < 160    GASTROINTESTINAL: Swallow eval recommending pureed. S/p MBS with diet upgrade on 9/2.     Diet: minced and moist with mildly thick liquids, fed by teaspoon only    RENAL: BUN/Cr stable, good urine output. Now off hypertonic saline      Na Goal: 145-150     Ying: No    HEMATOLOGY: H/H within normal limits, Platelets 199     DVT ppx: Eliquis    ID: Afebrile, resolved leukocytosis     OTHER: Plan discussed with patient and family at beside.     DISPOSITION: acute Rehab vs home as family would like to take him home with 24 hr supervision. Patient pending medicaid insurance     CORE MEASURES:        Admission NIHSS: 1     TPA: [] YES [x] NO      LDL/HDL: 51/51     Depression Screen: no      Statin Therapy: yes     Dysphagia Screen: [] PASS [x] FAIL     Smoking [] YES [x] NO      Afib [x] YES [] NO     Stroke Education [x] YES [] NO    Obtain screening lower extremity venous ultrasound in patients who meet 1 or more of the following criteria as patient is high risk for DVT/PE on admission:   [] History of DVT/PE  [] Hypercoagulable states (Factor V Leiden, Cancer, OCP, etc. )  [] Prolonged immobility (hemiplegia/hemiparesis/post operative or any other extended immobilization)  [] Transferred from outside facility (Rehab or Long term care)  [] Age </= to 50

## 2023-09-05 NOTE — PROGRESS NOTE ADULT - SUBJECTIVE AND OBJECTIVE BOX
Subjective: Patient seen and examined. No new events except as noted.   remains in Stroke unit       REVIEW OF SYSTEMS:    CONSTITUTIONAL: + weakness, fevers or chills  EYES/ENT: No visual changes;  No vertigo or throat pain   NECK: No pain or stiffness  RESPIRATORY: No cough, wheezing, hemoptysis; No shortness of breath  CARDIOVASCULAR: No chest pain or palpitations  GASTROINTESTINAL: No abdominal or epigastric pain. No nausea, vomiting, or hematemesis; No diarrhea or constipation. No melena or hematochezia.  GENITOURINARY: No dysuria, frequency or hematuria  NEUROLOGICAL: No numbness or weakness  SKIN: No itching, burning, rashes, or lesions   All other review of systems is negative unless indicated above.    MEDICATIONS:  MEDICATIONS  (STANDING):  albuterol/ipratropium for Nebulization 3 milliLiter(s) Nebulizer every 6 hours  amLODIPine   Tablet 5 milliGRAM(s) Oral daily  apixaban 5 milliGRAM(s) Oral two times a day  atorvastatin 80 milliGRAM(s) Oral at bedtime  furosemide    Tablet 20 milliGRAM(s) Oral daily  losartan 50 milliGRAM(s) Oral daily  senna 2 Tablet(s) Oral at bedtime      PHYSICAL EXAM:  T(C): 36.7 (09-05-23 @ 04:00), Max: 36.9 (09-04-23 @ 20:00)  HR: 60 (09-05-23 @ 09:10) (55 - 84)  BP: 164/74 (09-05-23 @ 09:10) (133/66 - 165/101)  RR: 24 (09-05-23 @ 09:10) (19 - 26)  SpO2: 96% (09-05-23 @ 09:10) (92% - 98%)  Wt(kg): --  I&O's Summary    04 Sep 2023 07:01  -  05 Sep 2023 07:00  --------------------------------------------------------  IN: 0 mL / OUT: 1000 mL / NET: -1000 mL            Appearance: Normal	  HEENT:   Normal oral mucosa, PERRL, EOMI	  Lymphatic: No lymphadenopathy , no edema  Cardiovascular: Irregular S1 S2, No JVD, No murmurs , Peripheral pulses palpable 2+ bilaterally  Respiratory: Lungs clear to auscultation, normal effort 	  Gastrointestinal:  Soft, Non-tender, + BS	  Skin: No rashes, No ecchymoses, No cyanosis, warm to touch  NEUROLOGICAL EXAM:  Mental status: Alert, Oriented to self, hospital, not year or age  Cranial Nerves: PERRL, intact BTT b/l, tracks examiner with eyes, No facial asymmetry, mild dysarthria  Motor exam: Normal tone, no drift in b/l UE and LE   Sensation: Intact to light touch   Coordination: R side dysmetria   Gait: Deferred   Ext: No edema          LABS:    CARDIAC MARKERS:                                14.6   9.17  )-----------( 199      ( 05 Sep 2023 06:15 )             44.0     09-05    139  |  101  |  12  ----------------------------<  110<H>  3.4<L>   |  25  |  0.84    Ca    8.9      05 Sep 2023 06:15          TELEMETRY: 	Afib     ECG:  	  RADIOLOGY:   DIAGNOSTIC TESTING:  [ ] Echocardiogram:  [ ]  Catheterization:  [ ] Stress Test:    OTHER:

## 2023-09-06 ENCOUNTER — TRANSCRIPTION ENCOUNTER (OUTPATIENT)
Age: 83
End: 2023-09-06

## 2023-09-06 VITALS
OXYGEN SATURATION: 97 % | HEART RATE: 65 BPM | DIASTOLIC BLOOD PRESSURE: 74 MMHG | RESPIRATION RATE: 18 BRPM | SYSTOLIC BLOOD PRESSURE: 149 MMHG

## 2023-09-06 LAB
ANION GAP SERPL CALC-SCNC: 12 MMOL/L — SIGNIFICANT CHANGE UP (ref 5–17)
BUN SERPL-MCNC: 14 MG/DL — SIGNIFICANT CHANGE UP (ref 7–23)
CALCIUM SERPL-MCNC: 9.3 MG/DL — SIGNIFICANT CHANGE UP (ref 8.4–10.5)
CHLORIDE SERPL-SCNC: 101 MMOL/L — SIGNIFICANT CHANGE UP (ref 96–108)
CO2 SERPL-SCNC: 24 MMOL/L — SIGNIFICANT CHANGE UP (ref 22–31)
CREAT SERPL-MCNC: 0.83 MG/DL — SIGNIFICANT CHANGE UP (ref 0.5–1.3)
EGFR: 88 ML/MIN/1.73M2 — SIGNIFICANT CHANGE UP
GLUCOSE SERPL-MCNC: 92 MG/DL — SIGNIFICANT CHANGE UP (ref 70–99)
HCT VFR BLD CALC: 43.2 % — SIGNIFICANT CHANGE UP (ref 39–50)
HGB BLD-MCNC: 14.5 G/DL — SIGNIFICANT CHANGE UP (ref 13–17)
MCHC RBC-ENTMCNC: 31.7 PG — SIGNIFICANT CHANGE UP (ref 27–34)
MCHC RBC-ENTMCNC: 33.6 GM/DL — SIGNIFICANT CHANGE UP (ref 32–36)
MCV RBC AUTO: 94.5 FL — SIGNIFICANT CHANGE UP (ref 80–100)
NRBC # BLD: 0 /100 WBCS — SIGNIFICANT CHANGE UP (ref 0–0)
PLATELET # BLD AUTO: 213 K/UL — SIGNIFICANT CHANGE UP (ref 150–400)
POTASSIUM SERPL-MCNC: 3.6 MMOL/L — SIGNIFICANT CHANGE UP (ref 3.5–5.3)
POTASSIUM SERPL-SCNC: 3.6 MMOL/L — SIGNIFICANT CHANGE UP (ref 3.5–5.3)
RBC # BLD: 4.57 M/UL — SIGNIFICANT CHANGE UP (ref 4.2–5.8)
RBC # FLD: 12 % — SIGNIFICANT CHANGE UP (ref 10.3–14.5)
SODIUM SERPL-SCNC: 137 MMOL/L — SIGNIFICANT CHANGE UP (ref 135–145)
WBC # BLD: 7.59 K/UL — SIGNIFICANT CHANGE UP (ref 3.8–10.5)
WBC # FLD AUTO: 7.59 K/UL — SIGNIFICANT CHANGE UP (ref 3.8–10.5)

## 2023-09-06 PROCEDURE — 97162 PT EVAL MOD COMPLEX 30 MIN: CPT

## 2023-09-06 PROCEDURE — 71045 X-RAY EXAM CHEST 1 VIEW: CPT

## 2023-09-06 PROCEDURE — 36415 COLL VENOUS BLD VENIPUNCTURE: CPT

## 2023-09-06 PROCEDURE — 84443 ASSAY THYROID STIM HORMONE: CPT

## 2023-09-06 PROCEDURE — 97530 THERAPEUTIC ACTIVITIES: CPT

## 2023-09-06 PROCEDURE — 84484 ASSAY OF TROPONIN QUANT: CPT

## 2023-09-06 PROCEDURE — 93005 ELECTROCARDIOGRAM TRACING: CPT

## 2023-09-06 PROCEDURE — 85730 THROMBOPLASTIN TIME PARTIAL: CPT

## 2023-09-06 PROCEDURE — 70496 CT ANGIOGRAPHY HEAD: CPT | Mod: MA

## 2023-09-06 PROCEDURE — 92526 ORAL FUNCTION THERAPY: CPT

## 2023-09-06 PROCEDURE — 70551 MRI BRAIN STEM W/O DYE: CPT

## 2023-09-06 PROCEDURE — 84439 ASSAY OF FREE THYROXINE: CPT

## 2023-09-06 PROCEDURE — T1013: CPT

## 2023-09-06 PROCEDURE — 84100 ASSAY OF PHOSPHORUS: CPT

## 2023-09-06 PROCEDURE — 74230 X-RAY XM SWLNG FUNCJ C+: CPT

## 2023-09-06 PROCEDURE — 97110 THERAPEUTIC EXERCISES: CPT

## 2023-09-06 PROCEDURE — 70498 CT ANGIOGRAPHY NECK: CPT | Mod: MA

## 2023-09-06 PROCEDURE — 85025 COMPLETE CBC W/AUTO DIFF WBC: CPT

## 2023-09-06 PROCEDURE — 93306 TTE W/DOPPLER COMPLETE: CPT

## 2023-09-06 PROCEDURE — 85396 CLOTTING ASSAY WHOLE BLOOD: CPT

## 2023-09-06 PROCEDURE — 83036 HEMOGLOBIN GLYCOSYLATED A1C: CPT

## 2023-09-06 PROCEDURE — 70450 CT HEAD/BRAIN W/O DYE: CPT

## 2023-09-06 PROCEDURE — 0042T: CPT | Mod: MA

## 2023-09-06 PROCEDURE — 87635 SARS-COV-2 COVID-19 AMP PRB: CPT

## 2023-09-06 PROCEDURE — 81001 URINALYSIS AUTO W/SCOPE: CPT

## 2023-09-06 PROCEDURE — 97116 GAIT TRAINING THERAPY: CPT

## 2023-09-06 PROCEDURE — 80053 COMPREHEN METABOLIC PANEL: CPT

## 2023-09-06 PROCEDURE — 92523 SPEECH SOUND LANG COMPREHEN: CPT

## 2023-09-06 PROCEDURE — 70547 MR ANGIOGRAPHY NECK W/O DYE: CPT

## 2023-09-06 PROCEDURE — 99285 EMERGENCY DEPT VISIT HI MDM: CPT

## 2023-09-06 PROCEDURE — 83880 ASSAY OF NATRIURETIC PEPTIDE: CPT

## 2023-09-06 PROCEDURE — 70544 MR ANGIOGRAPHY HEAD W/O DYE: CPT

## 2023-09-06 PROCEDURE — 82962 GLUCOSE BLOOD TEST: CPT

## 2023-09-06 PROCEDURE — 83735 ASSAY OF MAGNESIUM: CPT

## 2023-09-06 PROCEDURE — 94640 AIRWAY INHALATION TREATMENT: CPT

## 2023-09-06 PROCEDURE — 85027 COMPLETE CBC AUTOMATED: CPT

## 2023-09-06 PROCEDURE — 97165 OT EVAL LOW COMPLEX 30 MIN: CPT

## 2023-09-06 PROCEDURE — 99239 HOSP IP/OBS DSCHRG MGMT >30: CPT

## 2023-09-06 PROCEDURE — 92611 MOTION FLUOROSCOPY/SWALLOW: CPT

## 2023-09-06 PROCEDURE — 93970 EXTREMITY STUDY: CPT

## 2023-09-06 PROCEDURE — 92610 EVALUATE SWALLOWING FUNCTION: CPT

## 2023-09-06 PROCEDURE — 80048 BASIC METABOLIC PNL TOTAL CA: CPT

## 2023-09-06 PROCEDURE — 85610 PROTHROMBIN TIME: CPT

## 2023-09-06 PROCEDURE — 80061 LIPID PANEL: CPT

## 2023-09-06 RX ORDER — APIXABAN 2.5 MG/1
1 TABLET, FILM COATED ORAL
Qty: 0 | Refills: 0 | DISCHARGE

## 2023-09-06 RX ORDER — ATORVASTATIN CALCIUM 80 MG/1
1 TABLET, FILM COATED ORAL
Qty: 0 | Refills: 0 | DISCHARGE
Start: 2023-09-06

## 2023-09-06 RX ORDER — ATORVASTATIN CALCIUM 80 MG/1
20 TABLET, FILM COATED ORAL AT BEDTIME
Refills: 0 | Status: DISCONTINUED | OUTPATIENT
Start: 2023-09-06 | End: 2023-09-06

## 2023-09-06 RX ADMIN — Medication 3 MILLILITER(S): at 05:26

## 2023-09-06 RX ADMIN — Medication 3 MILLILITER(S): at 12:36

## 2023-09-06 RX ADMIN — LOSARTAN POTASSIUM 50 MILLIGRAM(S): 100 TABLET, FILM COATED ORAL at 05:26

## 2023-09-06 RX ADMIN — AMLODIPINE BESYLATE 5 MILLIGRAM(S): 2.5 TABLET ORAL at 05:26

## 2023-09-06 RX ADMIN — Medication 20 MILLIGRAM(S): at 05:26

## 2023-09-06 RX ADMIN — Medication 100 MILLIGRAM(S): at 02:00

## 2023-09-06 RX ADMIN — APIXABAN 5 MILLIGRAM(S): 2.5 TABLET, FILM COATED ORAL at 05:26

## 2023-09-06 NOTE — PROGRESS NOTE ADULT - NS ATTEND AMEND GEN_ALL_CORE FT
Thirty five minutes of discharge time was spent on patient exam and discussion including test results, pathophysiology, natural history, stroke risk factors, medication changes and secondary prophylaxis and importance of medication compliance. We also discussed follow up plan. This was discussed with patient/family, who expresses understanding. Follow up in stroke clinic.

## 2023-09-06 NOTE — PROGRESS NOTE ADULT - ASSESSMENT
82M R-handed PMHx Afib (not on AC), HTN, and strokes (2021, 2022; residual deficit of dysarthria) presenting with sudden onset "dizziness." No prior h/o similar symptoms. Vitals notable for systolic >180s. CBC and CMP WNL. CTH showing chronic left parietotemporal infarct but no acute findings. CTA neck w/o flow-limiting stenosis but CTA head demonstrating high-grade narrowing of proximal basilar artery with patency of distal basilar artery and its branches. Pt not a candidate for tenecteplase due to non-disabling symptoms and not a thrombectomy candidate due to no LVO seen on CTA H/N. Course c/b hemorrhagic transformation of R cerebellar infarct with mass effect, after which pt transferred to NSCU for monitoring.    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 Mechanism of stroke potentially cardioembolic due to Afib.     NEURO: neuro exam unchanged,  C/o intermittent dizziness throughout hospital course, zofran and meclizine PRN. CTH stable on 9/4. Previously pt's mental status had decreased on 8/29 and given R cerebellar infarct with mass effect and hemorrhagic conversion, Eliquis held and pt transferred to NSCU for closer monitoring. MRI with results as above, repeat MRI w/o contrast to evaluate potential evolution of underlying ischemic stroke and hemorrhagic transformation with stable findings. Goal for normotension. Will decreased Lipitor to 20mg as LDL< 70Physical therapy/OT rec AR.    ANTITHROMBOTIC THERAPY: Eliquis 5mg BID for Afib (free trial approved)     PULMONARY: protecting airway, saturating well. CXR with pulmonary edema, will c/w lasix. C/w douneb q6h.     CARDIOVASCULAR: TTE EF 55-60%, continue cardiac monitoring. Has hx of afib, now on Eliquis 5mg bid. Restarted home Lasix 20mg and HTN meds - losartan and norvasc for BP control.  Pro BNP is 3928.         SBP goal: 110-160mmHg    GASTROINTESTINAL: Swallow eval recommending pureed. S/p MBS with diet upgrade on 9/2, tolerating well.     Diet: minced and moist with mildly thick liquids, fed by teaspoon only    RENAL: BUN/Cr stable, good urine output. Now off hypertonic saline      Na Goal: 145-150     Ying: No    HEMATOLOGY: H/H within normal limits, Platelets 213     DVT ppx: no need as pt is on Eliquis for Afib    ID: Afebrile, resolved leukocytosis     OTHER: Plan discussed with patient and family at beside.     DISPOSITION: acute Rehab, Pt is medically stable and work up is completed. Patient pending medicaid insurance     CORE MEASURES:        Admission NIHSS: 1     TPA: [] YES [x] NO      LDL/HDL: 51/51     Depression Screen: no      Statin Therapy: yes     Dysphagia Screen: [] PASS [x] FAIL     Smoking [] YES [x] NO      Afib [x] YES [] NO     Stroke Education [x] YES [] NO    Obtain screening lower extremity venous ultrasound in patients who meet 1 or more of the following criteria as patient is high risk for DVT/PE on admission:   [] History of DVT/PE  [] Hypercoagulable states (Factor V Leiden, Cancer, OCP, etc. )  [] Prolonged immobility (hemiplegia/hemiparesis/post operative or any other extended immobilization)  [] Transferred from outside facility (Rehab or Long term care)  [] Age </= to 50

## 2023-09-06 NOTE — DISCHARGE NOTE NURSING/CASE MANAGEMENT/SOCIAL WORK - PATIENT PORTAL LINK FT
You can access the FollowMyHealth Patient Portal offered by [facility] by registering at the following website: [email or web address]. By joining [employer]s FollowMyHealth portal, you will also be able to view your health information using other applications (apps) compatible with our system.

## 2023-09-06 NOTE — DISCHARGE NOTE NURSING/CASE MANAGEMENT/SOCIAL WORK - NSDCPEFALRISK_GEN_ALL_CORE
For information on Fall & Injury Prevention, visit: [email or web address] OR  [email or web address] OR  https://www.cdc.gov/steadi/patient.html

## 2023-09-06 NOTE — PROGRESS NOTE ADULT - PROVIDER SPECIALTY LIST ADULT
NSICU
Neurology
NSICU
NSICU
Neurology
Neurology
NSICU
Neurology
Neurology
Neurosurgery
NSICU
NSICU
Neurology
Neurology
Neurosurgery
Cardiology-Oncology
NSICU
Neurosurgery
Cardiology

## 2023-09-06 NOTE — PROGRESS NOTE ADULT - SUBJECTIVE AND OBJECTIVE BOX
THE PATIENT WAS SEEN AND EXAMINED BY ME WITH THE HOUSESTAFF AND STROKE TEAM DURING MORNING ROUNDS.   HPI:  82M R-handed PMHx Afib (not on AC), HTN, and strokes (2021, 2022; residual deficit of dysarthria) presenting with sudden onset "dizziness." Pt was lying down in his bed getting ready to sleep when he noticed sudden onset of "room-spinning sensation." No prior h/o similar symptoms. No fever, chills, HA, nausea or vomiting. No focal numbness or weakness at the time of symptom onset. Symptoms continued to persist therefore, visited ED. Code stroke activated upon arrival. LKN: 0000 8/28/23. NIHSS: 1 (dysarthria). mRS: 1.    SUBJECTIVE: No events overnight.  No new neurologic complaints, ROS reported negative unless otherwise noted.      acetaminophen     Tablet .. 650 milliGRAM(s) Oral every 6 hours PRN  albuterol/ipratropium for Nebulization 3 milliLiter(s) Nebulizer every 6 hours  amLODIPine   Tablet 5 milliGRAM(s) Oral daily  apixaban 5 milliGRAM(s) Oral two times a day  atorvastatin 20 milliGRAM(s) Oral at bedtime  furosemide    Tablet 20 milliGRAM(s) Oral daily  guaiFENesin Oral Liquid (Sugar-Free) 100 milliGRAM(s) Oral every 6 hours PRN  losartan 50 milliGRAM(s) Oral daily  meclizine 25 milliGRAM(s) Oral daily PRN  senna 2 Tablet(s) Oral at bedtime      PHYSICAL EXAM:   Vital Signs Last 24 Hrs  T(C): 36.3 (06 Sep 2023 06:00), Max: 37.1 (05 Sep 2023 12:00)  T(F): 97.4 (06 Sep 2023 06:00), Max: 98.8 (05 Sep 2023 12:00)  HR: 64 (06 Sep 2023 06:00) (57 - 76)  BP: 150/70 (06 Sep 2023 06:00) (124/87 - 168/94)  BP(mean): 95 (06 Sep 2023 06:00) (90 - 108)  RR: 29 (06 Sep 2023 06:00) (20 - 30)  SpO2: 98% (06 Sep 2023 06:00) (90% - 99%)    Parameters below as of 06 Sep 2023 06:00  Patient On (Oxygen Delivery Method): nasal cannula  O2 Flow (L/min): 2      General: No acute distress  HEENT: EOM intact, visual fields full  Abdomen: Soft, nontender, nondistended   Extremities: No edema    NEUROLOGICAL EXAM: Mandarin translation provided bu daughter, refused   Mental status: Eyes open, alert, Oriented to self, hospital, not year or age, fluent speech in Mandarin  Cranial Nerves: PERRL, intact BTT b/l, tracks examiner with eyes, No facial asymmetry, mild dysarthria  Motor exam: Normal tone, no drift in b/l UE and LE   Sensation: Intact to light touch   Coordination: R side dysmetria   Gait: Deferred     LABS:                        14.5   7.59  )-----------( 213      ( 06 Sep 2023 07:03 )             43.2    09-06    137  |  101  |  14  ----------------------------<  92  3.6   |  24  |  0.83    Ca    9.3      06 Sep 2023 07:03          IMAGING: Reviewed by me.     CT Head No Cont (08.30.23): No significant interval change from 8/29/2023. Similar-appearing acute right lateral cerebellar infarct with similar hemorrhagic transformation.    CT Head No Cont (09.01.23): Similar-appearing hemorrhagic right cerebellar hemisphere infarction with similar mass effect upon the fourth ventricle which is significantly effaced. Redemonstration of chronic left temporal infarct.  No hydrocephalus, midline shift, or effacement of the basal cisterns.    MR Head No Cont (09.01.23): Reidentified is a large subacute infarct in the right cerebellum and medial left cerebellum. Mild secondary mass effect upon the fourth ventricle appears stable. Ventricular size is stable. No associated   hemorrhage. Reidentified is a chronic infarct in the left posterior MCA territory.    MR Head No Cont (08.28.23): MRI brain: Acute infarct in the right anterior cerebellum with associated internal hemorrhagic blood products.  No significant mass effect. There are chronic infarct in the left cerebellum, as well as a large chronic infarct of the left posterior MCA territory with old hemorrhagic blood products.    MRA brain: No hemodynamically significant stenosis    MRA neck: Markedly limited by significant motion. No gross evidence of occlusion.

## 2023-09-06 NOTE — PROGRESS NOTE ADULT - REASON FOR ADMISSION
Vertigo

## 2023-09-06 NOTE — PROGRESS NOTE ADULT - PROBLEM SELECTOR PLAN 1
recurrent   R cerebellar infact in the setting of basilar thrombus    - Mechanism of stroke potentially cardioembolic due to Afib    - now with hemorraghic conversion   pending repeat imaging  ASA/Statin  ECHO - EF 55-60%, mild to mod AR, mild MR, trace IA, mild pulm HTN  management as per Stroke Team
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
recurrent   R cerebellar infact in the setting of basilar thrombus    - Mechanism of stroke potentially cardioembolic due to Afib    - now with hemorraghic conversion   Eliquis/Statin  ECHO - EF 55-60%, mild to mod AR, mild MR, trace AR, mild pulm HTN  management as per Stroke Team
recurrent   R cerebellar infact in the setting of basilar thrombus    - Mechanism of stroke potentially cardioembolic due to Afib    - now with hemorraghic conversion   pending repeat imaging  ASA/Statin  ECHO - EF 55-60%, mild to mod AR, mild MR, trace IA, mild pulm HTN  management as per Stroke Team
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
recurrent   now with hemorraghic conversion   Orders per NSCU   Hydrocephalus watch   3% at 50 ml/hr, BMP q 6 hr, sodium goal 145-155  F/U repeat CTH.

## 2023-09-06 NOTE — PROGRESS NOTE ADULT - SUBJECTIVE AND OBJECTIVE BOX
Subjective: Patient seen and examined. No new events except as noted.   remains in stroke unit     REVIEW OF SYSTEMS:    CONSTITUTIONAL: + weakness, fevers or chills  EYES/ENT: No visual changes;  No vertigo or throat pain   NECK: No pain or stiffness  RESPIRATORY: No cough, wheezing, hemoptysis; No shortness of breath  CARDIOVASCULAR: No chest pain or palpitations  GASTROINTESTINAL: No abdominal or epigastric pain. No nausea, vomiting, or hematemesis; No diarrhea or constipation. No melena or hematochezia.  GENITOURINARY: No dysuria, frequency or hematuria  NEUROLOGICAL: No numbness or weakness  SKIN: No itching, burning, rashes, or lesions   All other review of systems is negative unless indicated above.    MEDICATIONS:  MEDICATIONS  (STANDING):  albuterol/ipratropium for Nebulization 3 milliLiter(s) Nebulizer every 6 hours  amLODIPine   Tablet 5 milliGRAM(s) Oral daily  apixaban 5 milliGRAM(s) Oral two times a day  atorvastatin 20 milliGRAM(s) Oral at bedtime  furosemide    Tablet 20 milliGRAM(s) Oral daily  losartan 50 milliGRAM(s) Oral daily  senna 2 Tablet(s) Oral at bedtime      PHYSICAL EXAM:  T(C): 36.3 (09-06-23 @ 06:00), Max: 37.1 (09-05-23 @ 12:00)  HR: 64 (09-06-23 @ 06:00) (57 - 76)  BP: 150/70 (09-06-23 @ 06:00) (124/87 - 168/94)  RR: 29 (09-06-23 @ 06:00) (20 - 30)  SpO2: 98% (09-06-23 @ 06:00) (90% - 99%)  Wt(kg): --  I&O's Summary    05 Sep 2023 07:01  -  06 Sep 2023 07:00  --------------------------------------------------------  IN: 600 mL / OUT: 290 mL / NET: 310 mL          Appearance: Normal	  HEENT:   Normal oral mucosa, PERRL, EOMI	  Lymphatic: No lymphadenopathy , no edema  Cardiovascular: Irregular S1 S2, No JVD, No murmurs , Peripheral pulses palpable 2+ bilaterally  Respiratory: Lungs clear to auscultation, normal effort 	  Gastrointestinal:  Soft, Non-tender, + BS	  Skin: No rashes, No ecchymoses, No cyanosis, warm to touch  NEUROLOGICAL EXAM:  Mental status: Alert, Oriented to self, hospital, not year or age  Cranial Nerves: PERRL, intact BTT b/l, tracks examiner with eyes, No facial asymmetry, mild dysarthria  Motor exam: Normal tone, no drift in b/l UE and LE   Sensation: Intact to light touch   Coordination: R side dysmetria   Gait: Deferred   Ext: No edema    LABS:    CARDIAC MARKERS:                                14.5   7.59  )-----------( 213      ( 06 Sep 2023 07:03 )             43.2     09-06    137  |  101  |  14  ----------------------------<  92  3.6   |  24  |  0.83    Ca    9.3      06 Sep 2023 07:03      proBNP:   Lipid Profile:   HgA1c:   TSH:             TELEMETRY: 	AF    ECG:  	  RADIOLOGY:   DIAGNOSTIC TESTING:  [ ] Echocardiogram:  [ ]  Catheterization:  [ ] Stress Test:    OTHER:

## 2023-09-06 NOTE — PROGRESS NOTE ADULT - PROBLEM SELECTOR PLAN 3
goal <140  c/w meds as ordered  continue to monitor
stable at present time.

## 2023-09-06 NOTE — PROGRESS NOTE ADULT - PROBLEM SELECTOR PLAN 2
High CHADSVASC2 score   on AC - Eliquis    - if able to make it out of current situation, will eventually plan for [employer] device implant    - Discussed with family at bedside   rate controlled  continue to monitor on tele
High CHADSVASC2 score     - if able to make it out of current situation, will eventually plan for [employer]mans device implant    - Discussed with family at bedside   rate controlled  continue to monitor on tele
High CHADSVASC2 score   on AC - Eliquis    - if able to make it out of current situation, will eventually plan for [employer]mans device implant    - Discussed with family at bedside   rate controlled  continue to monitor on tele
High CHADSVASC2 score   if able to make it out of current situation, will eventually plan for [employer]mans device implant  Discussed with family at bedside   Currently rate controlled.
High CHADSVASC2 score   on AC - Eliquis    - if able to make it out of current situation, will eventually plan for [employer] device implant    - Discussed with family at bedside   rate controlled  continue to monitor on tele
High CHADSVASC2 score     - if able to make it out of current situation, will eventually plan for [employer]mans device implant    - Discussed with family at bedside   rate controlled  continue to monitor on tele

## 2023-10-04 PROBLEM — Z00.00 ENCOUNTER FOR PREVENTIVE HEALTH EXAMINATION: Status: ACTIVE | Noted: 2023-10-04

## 2023-10-06 ENCOUNTER — OUTPATIENT (OUTPATIENT)
Dept: OUTPATIENT SERVICES | Facility: HOSPITAL | Age: 83
LOS: 1 days | End: 2023-10-06
Payer: MEDICAID

## 2023-10-06 ENCOUNTER — APPOINTMENT (OUTPATIENT)
Dept: RADIOLOGY | Facility: CLINIC | Age: 83
End: 2023-10-06
Payer: MEDICAID

## 2023-10-06 DIAGNOSIS — Z00.8 ENCOUNTER FOR OTHER GENERAL EXAMINATION: ICD-10-CM

## 2023-10-06 PROBLEM — I63.9 CEREBRAL INFARCTION, UNSPECIFIED: Chronic | Status: ACTIVE | Noted: 2023-08-28

## 2023-10-06 PROBLEM — I10 ESSENTIAL (PRIMARY) HYPERTENSION: Chronic | Status: ACTIVE | Noted: 2023-08-28

## 2023-10-06 PROBLEM — I48.91 UNSPECIFIED ATRIAL FIBRILLATION: Chronic | Status: ACTIVE | Noted: 2023-08-28

## 2023-10-06 PROCEDURE — 73560 X-RAY EXAM OF KNEE 1 OR 2: CPT

## 2023-10-06 PROCEDURE — 73560 X-RAY EXAM OF KNEE 1 OR 2: CPT | Mod: 26,50

## 2023-11-07 ENCOUNTER — APPOINTMENT (OUTPATIENT)
Dept: NEUROLOGY | Facility: CLINIC | Age: 83
End: 2023-11-07

## 2023-11-07 ENCOUNTER — NON-APPOINTMENT (OUTPATIENT)
Age: 83
End: 2023-11-07

## 2023-11-13 ENCOUNTER — APPOINTMENT (OUTPATIENT)
Dept: NEUROLOGY | Facility: CLINIC | Age: 83
End: 2023-11-13
Payer: SUBSIDIZED

## 2023-11-13 DIAGNOSIS — I65.1 OCCLUSION AND STENOSIS OF BASILAR ARTERY: ICD-10-CM

## 2023-11-13 DIAGNOSIS — I63.9 CEREBRAL INFARCTION, UNSPECIFIED: ICD-10-CM

## 2023-11-13 PROCEDURE — 99205 OFFICE O/P NEW HI 60 MIN: CPT

## 2023-11-13 RX ORDER — ATORVASTATIN CALCIUM 80 MG/1
80 TABLET, FILM COATED ORAL
Refills: 0 | Status: ACTIVE | COMMUNITY

## 2023-11-13 RX ORDER — APIXABAN 5 MG/1
5 TABLET, FILM COATED ORAL
Refills: 0 | Status: ACTIVE | COMMUNITY

## 2023-11-13 RX ORDER — AMLODIPINE BESYLATE 5 MG/1
5 TABLET ORAL
Refills: 0 | Status: ACTIVE | COMMUNITY

## 2023-11-13 RX ORDER — FUROSEMIDE 80 MG/1
TABLET ORAL
Refills: 0 | Status: ACTIVE | COMMUNITY

## 2023-11-13 RX ORDER — LOSARTAN POTASSIUM 50 MG/1
50 TABLET, FILM COATED ORAL
Refills: 0 | Status: ACTIVE | COMMUNITY

## 2023-11-24 ENCOUNTER — APPOINTMENT (OUTPATIENT)
Dept: MRI IMAGING | Facility: CLINIC | Age: 83
End: 2023-11-24
Payer: MEDICAID

## 2023-11-24 ENCOUNTER — OUTPATIENT (OUTPATIENT)
Dept: OUTPATIENT SERVICES | Facility: HOSPITAL | Age: 83
LOS: 1 days | End: 2023-11-24
Payer: MEDICAID

## 2023-11-24 DIAGNOSIS — I65.1 OCCLUSION AND STENOSIS OF BASILAR ARTERY: ICD-10-CM

## 2023-11-24 PROCEDURE — 70544 MR ANGIOGRAPHY HEAD W/O DYE: CPT | Mod: 26

## 2023-11-24 PROCEDURE — 70544 MR ANGIOGRAPHY HEAD W/O DYE: CPT

## 2024-07-31 ENCOUNTER — APPOINTMENT (OUTPATIENT)
Dept: RADIOLOGY | Facility: CLINIC | Age: 84
End: 2024-07-31
Payer: MEDICAID

## 2024-07-31 ENCOUNTER — OUTPATIENT (OUTPATIENT)
Dept: OUTPATIENT SERVICES | Facility: HOSPITAL | Age: 84
LOS: 1 days | End: 2024-07-31
Payer: MEDICAID

## 2024-07-31 DIAGNOSIS — E11.65 TYPE 2 DIABETES MELLITUS WITH HYPERGLYCEMIA: ICD-10-CM

## 2024-07-31 DIAGNOSIS — I10 ESSENTIAL (PRIMARY) HYPERTENSION: ICD-10-CM

## 2024-07-31 DIAGNOSIS — R80.9 PROTEINURIA, UNSPECIFIED: ICD-10-CM

## 2024-07-31 DIAGNOSIS — E55.9 VITAMIN D DEFICIENCY, UNSPECIFIED: ICD-10-CM

## 2024-07-31 DIAGNOSIS — E78.5 HYPERLIPIDEMIA, UNSPECIFIED: ICD-10-CM

## 2024-07-31 PROCEDURE — 71045 X-RAY EXAM CHEST 1 VIEW: CPT

## 2024-07-31 PROCEDURE — 71045 X-RAY EXAM CHEST 1 VIEW: CPT | Mod: 26
